# Patient Record
Sex: FEMALE | Race: WHITE | Employment: OTHER | URBAN - METROPOLITAN AREA
[De-identification: names, ages, dates, MRNs, and addresses within clinical notes are randomized per-mention and may not be internally consistent; named-entity substitution may affect disease eponyms.]

---

## 2017-09-27 ENCOUNTER — OFFICE VISIT (OUTPATIENT)
Dept: CARDIOLOGY CLINIC | Age: 77
End: 2017-09-27

## 2017-09-27 VITALS
HEART RATE: 68 BPM | SYSTOLIC BLOOD PRESSURE: 128 MMHG | WEIGHT: 190 LBS | HEIGHT: 63 IN | DIASTOLIC BLOOD PRESSURE: 78 MMHG | BODY MASS INDEX: 33.66 KG/M2

## 2017-09-27 DIAGNOSIS — I48.19 PERSISTENT ATRIAL FIBRILLATION (HCC): Primary | ICD-10-CM

## 2017-09-27 PROCEDURE — 1036F TOBACCO NON-USER: CPT | Performed by: INTERNAL MEDICINE

## 2017-09-27 PROCEDURE — G8419 CALC BMI OUT NRM PARAM NOF/U: HCPCS | Performed by: INTERNAL MEDICINE

## 2017-09-27 PROCEDURE — 1123F ACP DISCUSS/DSCN MKR DOCD: CPT | Performed by: INTERNAL MEDICINE

## 2017-09-27 PROCEDURE — 4040F PNEUMOC VAC/ADMIN/RCVD: CPT | Performed by: INTERNAL MEDICINE

## 2017-09-27 PROCEDURE — 99213 OFFICE O/P EST LOW 20 MIN: CPT | Performed by: INTERNAL MEDICINE

## 2017-09-27 PROCEDURE — G8400 PT W/DXA NO RESULTS DOC: HCPCS | Performed by: INTERNAL MEDICINE

## 2017-09-27 PROCEDURE — 1090F PRES/ABSN URINE INCON ASSESS: CPT | Performed by: INTERNAL MEDICINE

## 2017-09-27 PROCEDURE — G8428 CUR MEDS NOT DOCUMENT: HCPCS | Performed by: INTERNAL MEDICINE

## 2017-09-27 RX ORDER — LANOLIN ALCOHOL/MO/W.PET/CERES
1000 CREAM (GRAM) TOPICAL EVERY OTHER DAY
COMMUNITY

## 2017-09-27 RX ORDER — ACETAMINOPHEN 325 MG/1
650 TABLET ORAL 3 TIMES DAILY
COMMUNITY
End: 2021-09-29

## 2017-12-22 ENCOUNTER — TELEPHONE (OUTPATIENT)
Dept: CARDIOLOGY CLINIC | Age: 77
End: 2017-12-22

## 2017-12-22 ENCOUNTER — NURSE ONLY (OUTPATIENT)
Dept: CARDIOLOGY CLINIC | Age: 77
End: 2017-12-22

## 2017-12-22 VITALS
HEART RATE: 83 BPM | HEIGHT: 63 IN | WEIGHT: 190 LBS | SYSTOLIC BLOOD PRESSURE: 130 MMHG | DIASTOLIC BLOOD PRESSURE: 80 MMHG | BODY MASS INDEX: 33.66 KG/M2

## 2017-12-22 DIAGNOSIS — R07.89 OTHER CHEST PAIN: Primary | ICD-10-CM

## 2017-12-22 PROCEDURE — 93000 ELECTROCARDIOGRAM COMPLETE: CPT | Performed by: INTERNAL MEDICINE

## 2017-12-22 NOTE — TELEPHONE ENCOUNTER
Daughter called stated that patient has been having some slight chest discomfort for about 3 days  No arm pain or sob please call

## 2018-07-13 LAB — TSH SERPL DL<=0.05 MIU/L-ACNC: 1.77 UIU/ML

## 2018-09-27 ENCOUNTER — OFFICE VISIT (OUTPATIENT)
Dept: CARDIOLOGY CLINIC | Age: 78
End: 2018-09-27
Payer: MEDICARE

## 2018-09-27 VITALS
BODY MASS INDEX: 33.66 KG/M2 | WEIGHT: 190 LBS | SYSTOLIC BLOOD PRESSURE: 122 MMHG | HEART RATE: 64 BPM | DIASTOLIC BLOOD PRESSURE: 82 MMHG | HEIGHT: 63 IN

## 2018-09-27 DIAGNOSIS — I48.91 ATRIAL FIBRILLATION, UNSPECIFIED TYPE (HCC): Primary | ICD-10-CM

## 2018-09-27 PROCEDURE — 1036F TOBACCO NON-USER: CPT | Performed by: INTERNAL MEDICINE

## 2018-09-27 PROCEDURE — G8400 PT W/DXA NO RESULTS DOC: HCPCS | Performed by: INTERNAL MEDICINE

## 2018-09-27 PROCEDURE — 4040F PNEUMOC VAC/ADMIN/RCVD: CPT | Performed by: INTERNAL MEDICINE

## 2018-09-27 PROCEDURE — 1101F PT FALLS ASSESS-DOCD LE1/YR: CPT | Performed by: INTERNAL MEDICINE

## 2018-09-27 PROCEDURE — 1090F PRES/ABSN URINE INCON ASSESS: CPT | Performed by: INTERNAL MEDICINE

## 2018-09-27 PROCEDURE — G8417 CALC BMI ABV UP PARAM F/U: HCPCS | Performed by: INTERNAL MEDICINE

## 2018-09-27 PROCEDURE — G8427 DOCREV CUR MEDS BY ELIG CLIN: HCPCS | Performed by: INTERNAL MEDICINE

## 2018-09-27 PROCEDURE — 99213 OFFICE O/P EST LOW 20 MIN: CPT | Performed by: INTERNAL MEDICINE

## 2018-09-27 PROCEDURE — 1123F ACP DISCUSS/DSCN MKR DOCD: CPT | Performed by: INTERNAL MEDICINE

## 2018-09-27 NOTE — PROGRESS NOTES
Value Date    WBC 6.3 08/26/2015    HCT 31.3 (L) 08/26/2015    MCV 97.4 08/26/2015     08/26/2015     No results found for: CHOL, TRIG, HDL, LDLCALC, LDLDIRECT, CHOLHDLRATIO  Lab Results   Component Value Date    ALT 32 08/06/2015    AST 20 08/06/2015     BMP:    Lab Results   Component Value Date     08/26/2015    K 4.2 08/26/2015     08/26/2015    CO2 23 08/26/2015    BUN 15 08/26/2015    CREATININE 0.9 08/26/2015     CMP:   Lab Results   Component Value Date     08/26/2015    K 4.2 08/26/2015     08/26/2015    CO2 23 08/26/2015    BUN 15 08/26/2015    PROT 5.0 08/06/2015     TSH:    Lab Results   Component Value Date    TSH 3.2 07/15/2014    TSHHS 4.380 07/29/2015           Impression:    No diagnosis found. Patient Active Problem List   Diagnosis Code    Syncope and collapse R55    Weakness R53.1    H/O echocardiogram Z92.89    DM2 (diabetes mellitus, type 2) (HCC) E11.9    Hypothyroid E03.9    Pulmonary embolus, right (HCC) I26.99    Right leg DVT ([facility]) I82.401    Pulmonary embolus (HCC) I26.99    Parkinson disease ([facility] [address] G20    Other chest pain R07.89       Assessment & Plan:    -  A Fib  HR controlled in RRR  On anticoag    -   DIABETES MELLITUS: Available pertinent lab data reviewed   and  patient was given dietary advice . Advised to check blood glucose level on a regular basis. -   Changes  in medicines made:  No                      Jose Angel Kapadia MA  [facility] [place]

## 2019-05-15 ENCOUNTER — NURSE ONLY (OUTPATIENT)
Dept: FAMILY MEDICINE CLINIC | Age: 79
End: 2019-05-15
Payer: MEDICARE

## 2019-05-15 DIAGNOSIS — I26.99 PULMONARY EMBOLUS, RIGHT (HCC): Primary | ICD-10-CM

## 2019-05-15 LAB
INTERNATIONAL NORMALIZATION RATIO, POC: 2.2
PROTHROMBIN TIME, POC: NORMAL

## 2019-05-15 PROCEDURE — 85610 PROTHROMBIN TIME: CPT | Performed by: FAMILY MEDICINE

## 2019-05-16 ENCOUNTER — TELEPHONE (OUTPATIENT)
Dept: FAMILY MEDICINE CLINIC | Age: 79
End: 2019-05-16

## 2019-05-16 NOTE — TELEPHONE ENCOUNTER
Spoke with dtr Cullen Paredes [phone]. Already aware result & has scheduled future inr visit.   Electronically signed by Jayce Farrell LPN on 8/57/7540 at 3:95 AM

## 2019-06-01 DIAGNOSIS — N39.0 RECURRENT URINARY TRACT INFECTION: ICD-10-CM

## 2019-06-01 DIAGNOSIS — E53.8 VITAMIN B12 DEFICIENCY: ICD-10-CM

## 2019-06-01 DIAGNOSIS — E55.9 VITAMIN D DEFICIENCY: ICD-10-CM

## 2019-06-01 RX ORDER — LISINOPRIL 5 MG/1
5 TABLET ORAL DAILY
COMMUNITY
End: 2019-09-27

## 2019-06-01 RX ORDER — MAGNESIUM OXIDE 400 MG/1
400 TABLET ORAL DAILY
COMMUNITY
End: 2019-06-19

## 2019-06-01 RX ORDER — BACLOFEN 10 MG/1
10 TABLET ORAL SEE ADMIN INSTRUCTIONS
COMMUNITY
End: 2020-04-27

## 2019-06-19 ENCOUNTER — OFFICE VISIT (OUTPATIENT)
Dept: FAMILY MEDICINE CLINIC | Age: 79
End: 2019-06-19
Payer: MEDICARE

## 2019-06-19 VITALS — DIASTOLIC BLOOD PRESSURE: 80 MMHG | SYSTOLIC BLOOD PRESSURE: 132 MMHG | HEART RATE: 72 BPM | OXYGEN SATURATION: 93 %

## 2019-06-19 DIAGNOSIS — E03.9 ACQUIRED HYPOTHYROIDISM: ICD-10-CM

## 2019-06-19 DIAGNOSIS — R53.83 FATIGUE, UNSPECIFIED TYPE: ICD-10-CM

## 2019-06-19 DIAGNOSIS — E11.9 TYPE 2 DIABETES MELLITUS WITHOUT COMPLICATION, WITHOUT LONG-TERM CURRENT USE OF INSULIN (HCC): ICD-10-CM

## 2019-06-19 DIAGNOSIS — I48.0 PAROXYSMAL ATRIAL FIBRILLATION (HCC): ICD-10-CM

## 2019-06-19 DIAGNOSIS — K59.00 CONSTIPATION, UNSPECIFIED CONSTIPATION TYPE: ICD-10-CM

## 2019-06-19 DIAGNOSIS — G20 PARKINSON DISEASE (HCC): ICD-10-CM

## 2019-06-19 DIAGNOSIS — E11.9 TYPE 2 DIABETES MELLITUS WITHOUT COMPLICATION, WITHOUT LONG-TERM CURRENT USE OF INSULIN (HCC): Primary | ICD-10-CM

## 2019-06-19 PROBLEM — R07.89 OTHER CHEST PAIN: Status: RESOLVED | Noted: 2017-12-22 | Resolved: 2019-06-19

## 2019-06-19 LAB
ALBUMIN SERPL-MCNC: 4.3 G/DL (ref 3.4–5)
ALP BLD-CCNC: 69 U/L (ref 40–129)
ALT SERPL-CCNC: <5 U/L (ref 10–40)
ANION GAP SERPL CALCULATED.3IONS-SCNC: 10 MMOL/L (ref 3–16)
AST SERPL-CCNC: 13 U/L (ref 15–37)
BASOPHILS ABSOLUTE: 0 K/UL (ref 0–0.2)
BASOPHILS RELATIVE PERCENT: 0.7 %
BILIRUB SERPL-MCNC: 0.4 MG/DL (ref 0–1)
BILIRUBIN DIRECT: <0.2 MG/DL (ref 0–0.3)
BILIRUBIN, INDIRECT: ABNORMAL MG/DL (ref 0–1)
BUN BLDV-MCNC: 28 MG/DL (ref 7–20)
CALCIUM SERPL-MCNC: 9.6 MG/DL (ref 8.3–10.6)
CHLORIDE BLD-SCNC: 101 MMOL/L (ref 99–110)
CO2: 24 MMOL/L (ref 21–32)
CREAT SERPL-MCNC: 0.9 MG/DL (ref 0.6–1.2)
EOSINOPHILS ABSOLUTE: 0.1 K/UL (ref 0–0.6)
EOSINOPHILS RELATIVE PERCENT: 1.7 %
GFR AFRICAN AMERICAN: >60
GFR NON-AFRICAN AMERICAN: >60
GLUCOSE BLD-MCNC: 130 MG/DL (ref 70–99)
HCT VFR BLD CALC: 40.4 % (ref 36–48)
HEMOGLOBIN: 13.4 G/DL (ref 12–16)
INTERNATIONAL NORMALIZATION RATIO, POC: 2
LYMPHOCYTES ABSOLUTE: 1.5 K/UL (ref 1–5.1)
LYMPHOCYTES RELATIVE PERCENT: 27.9 %
MCH RBC QN AUTO: 33.9 PG (ref 26–34)
MCHC RBC AUTO-ENTMCNC: 33.2 G/DL (ref 31–36)
MCV RBC AUTO: 102.1 FL (ref 80–100)
MONOCYTES ABSOLUTE: 0.6 K/UL (ref 0–1.3)
MONOCYTES RELATIVE PERCENT: 11.9 %
NEUTROPHILS ABSOLUTE: 3 K/UL (ref 1.7–7.7)
NEUTROPHILS RELATIVE PERCENT: 57.8 %
PDW BLD-RTO: 13.3 % (ref 12.4–15.4)
PLATELET # BLD: 269 K/UL (ref 135–450)
PMV BLD AUTO: 8.1 FL (ref 5–10.5)
POTASSIUM SERPL-SCNC: 4.5 MMOL/L (ref 3.5–5.1)
RBC # BLD: 3.95 M/UL (ref 4–5.2)
SODIUM BLD-SCNC: 135 MMOL/L (ref 136–145)
TOTAL PROTEIN: 7.3 G/DL (ref 6.4–8.2)
TSH REFLEX FT4: 2.1 UIU/ML (ref 0.27–4.2)
WBC # BLD: 5.3 K/UL (ref 4–11)

## 2019-06-19 PROCEDURE — 99214 OFFICE O/P EST MOD 30 MIN: CPT | Performed by: FAMILY MEDICINE

## 2019-06-19 ASSESSMENT — PATIENT HEALTH QUESTIONNAIRE - PHQ9
1. LITTLE INTEREST OR PLEASURE IN DOING THINGS: 0
2. FEELING DOWN, DEPRESSED OR HOPELESS: 0
SUM OF ALL RESPONSES TO PHQ9 QUESTIONS 1 & 2: 0
SUM OF ALL RESPONSES TO PHQ QUESTIONS 1-9: 0
SUM OF ALL RESPONSES TO PHQ QUESTIONS 1-9: 0

## 2019-06-19 ASSESSMENT — ENCOUNTER SYMPTOMS
SINUS PRESSURE: 0
SHORTNESS OF BREATH: 0
ABDOMINAL PAIN: 0
CHEST TIGHTNESS: 0
EYES NEGATIVE: 1
CONSTIPATION: 1
COUGH: 0
RESPIRATORY NEGATIVE: 1
DIARRHEA: 0
RHINORRHEA: 0
ALLERGIC/IMMUNOLOGIC NEGATIVE: 1
SORE THROAT: 0

## 2019-06-19 NOTE — PROGRESS NOTES
6/19/2019    Nirali Sparksford    Chief Complaint   Patient presents with    Other     - altered mental status post startting magnesium. returned to normal post stopping med    Other     - dtr was afraid to start lisinopril d/t sx's with magnesium       HPI  History was obtained from patient, daughter & her . Deven Taylor is a 78 y.o. female who presents today with complaint of altered mental status, and other complaints as well. States she had some altered mental status changes after she had started Magnesium. They stopped her Magnesium and her mental status went back to normal.  She still seems a bit sluggish or \"droopy\" to her family, but they say she is this way most of the time. Her daughter has not started her on the Lisinopril because she is worried of any interactions since the Magnesium episode. Complaining of feeling very tired and she can not keep her eyes open. She feels like she is squinting her eyes constantly. Her eyes are sensitive to bright lights. Has cataract but was to have surgery last year that they had to cancel due to a UTI. Her blood pressures @ home are up & down, numbers they are seeing are: 132/74, 137/69, 151/84, 90/60. She doesn't get much fluid intake, seems slightly dehydrated to daughter quite a bit. Complaining of constipation, jose lax 1 capful every morning, adding prune juice when needed. Her daughter notices Deven Taylor being more motivated or more awake if she is able to move her bowels. REVIEW OF SYMPTOMS    Review of Systems   Constitutional: Positive for fatigue. Negative for chills and fever. HENT: Negative. Negative for rhinorrhea, sinus pressure and sore throat. Eyes: Negative. Respiratory: Negative. Negative for cough, chest tightness and shortness of breath. Cardiovascular: Negative. Gastrointestinal: Positive for constipation. Negative for abdominal pain and diarrhea. Endocrine: Negative.     Genitourinary: Negative for dysuria and frequency. Musculoskeletal: Negative for myalgias. Skin: Negative. Allergic/Immunologic: Negative. Neurological: Positive for tremors. Hematological: Negative. PAST MEDICAL HISTORY  Past Medical History:   Diagnosis Date    Atrial fibrillation ([address] 01/27/2016    per holter monitor    Back pain     severe degeneration    Gallstone     H/O cardiovascular stress test 8/12/2014    cardiolite-normal, no ischemia, EF70%    H/O Doppler ultrasound 10/1/14    Carotid doppler. No hemodynamically significant stenosis bilaterally.  H/O echocardiogram 08/12/2014    EF >57%, mild mitral and tricuspid regurg, normal LV systolic but abnormal diastolic function, no pericardial effusion.     History of Holter monitoring 1/27/2016    new onset afib    Leg cramping     Recurrent urinary tract infection     Spinal stenosis of lumbar region     Vitamin B12 deficiency     Vitamin D deficiency        FAMILY HISTORY  Family History   Problem Relation Age of Onset    High Blood Pressure Sister     Heart Disease Brother        SOCIAL HISTORY  Social History     Socioeconomic History    Marital status:      Spouse name: None    Number of children: None    Years of education: None    Highest education level: None   Occupational History    None   Social Needs    Financial resource strain: None    Food insecurity:     Worry: None     Inability: None    Transportation needs:     Medical: None     Non-medical: None   Tobacco Use    Smoking status: Never Smoker    Smokeless tobacco: Never Used   Substance and Sexual Activity    Alcohol use: No    Drug use: No    Sexual activity: None   Lifestyle    Physical activity:     Days per week: None     Minutes per session: None    Stress: None   Relationships    Social connections:     Talks on phone: None     Gets together: None     Attends Mu-ism service: None     Active member of club or organization: None     Attends meetings of clubs or organizations: None     Relationship status: None    Intimate partner violence:     Fear of current or ex partner: None     Emotionally abused: None     Physically abused: None     Forced sexual activity: None   Other Topics Concern    None   Social History Narrative    None        SURGICAL HISTORY  No past surgical history on file. CURRENT MEDICATIONS  Current Outpatient Medications   Medication Sig Dispense Refill    ACCU-CHEK HIEN PLUS strip TEST ONCE A DAY AND AS NEEDED 50 strip 5    baclofen (LIORESAL) 10 MG tablet Take 10 mg by mouth See Admin Instructions Indications: 1/2 tab 6 times per day and 1 tab in evening      metFORMIN (GLUCOPHAGE) 500 MG tablet Take 500 mg by mouth 2 times daily (with meals)      acetaminophen (TYLENOL) 325 MG tablet Take 650 mg by mouth 3 times daily       vitamin B-12 (CYANOCOBALAMIN) 1000 MCG tablet Take 1,000 mcg by mouth daily      carbidopa-levodopa-entacapone (STALEVO) -200 MG TABS Take 1 tablet by mouth daily       warfarin (COUMADIN) 4 MG tablet Take 1 tablet by mouth daily (Patient taking differently: Take 4 mg by mouth daily 1/2-1 tab daily) 30 tablet 3    folic acid (FOLVITE) 1 MG tablet Take 1 tablet by mouth daily 30 tablet 3    amantadine (SYMMETREL) 100 MG capsule Take 1 capsule by mouth daily 60 capsule 3    levothyroxine (SYNTHROID) 50 MCG tablet Take 1 tablet by mouth Daily 30 tablet 3    polyethylene glycol (GLYCOLAX) powder Take 17 g by mouth daily   5    Cholecalciferol (VITAMIN D) 2000 UNITS CAPS capsule Take 1 capsule by mouth.  lisinopril (PRINIVIL;ZESTRIL) 5 MG tablet Take 5 mg by mouth daily      magnesium oxide (MAG-OX) 400 (241.3 MG) MG TABS tablet Take 1 tablet by mouth daily. 30 tablet 5     No current facility-administered medications for this visit.         ALLERGIES  Allergies   Allergen Reactions    Ciprofibrate     Ciprofloxacin Other (See Comments)     Anorexia      Pregabalin        PHYSICAL EXAM    BP 132/80   Pulse 72   SpO2 93%     Physical Exam   Constitutional: She is oriented to person, place, and time. She appears well-developed. HENT:   Head: Normocephalic. Eyes: Pupils are equal, round, and reactive to light. Conjunctivae and EOM are normal. Right eye exhibits no discharge. Left eye exhibits no discharge. No scleral icterus. Neck: Normal range of motion. Neck supple. Cardiovascular: Normal rate, regular rhythm and normal heart sounds. Exam reveals no gallop and no friction rub. No murmur heard. Heart regular and not fast  Negative for Carotid Bruit. Pulmonary/Chest: Effort normal and breath sounds normal. No stridor. No respiratory distress. She has no wheezes. She has no rales. She exhibits no tenderness. Lungs clear   Musculoskeletal: Normal range of motion. She exhibits no edema, tenderness or deformity. Neurological: She is alert and oriented to person, place, and time. Skin: Skin is warm and dry. No rash noted. She is not diaphoretic. No erythema. No pallor. Psychiatric: She has a normal mood and affect. Thought content normal.   Nursing note and vitals reviewed. ASSESSMENT & PLAN    1. Type 2 diabetes mellitus without complication, without long-term current use of insulin (HCC)  It sounds the patient's sugars are controlled. We will check labs and adjust medication off of the results. Daughter is concerned that medications are giving her side effects although none of her medication doses have changed that she is currently on. We will check her kidneys and liver to make sure that her processing her old medicines the same.  - blood glucose test strips (ACCU-CHEK HIEN PLUS) strip; Inject 1 each into the skin daily As needed. Dispense: 100 strip; Refill: 1  - Hepatic Function Panel; Future  - Hemoglobin A1C; Future    2. Acquired hypothyroidism  Recheck thyroid  - TSH WITH REFLEX TO FT4; Future    3.  Paroxysmal atrial fibrillation (HCC)  Is currently in sinus rhythm without problems. 4. Parkinson disease ([facility] [address]  Current complaints do not sound related to Parkinson's. 5. Fatigue, unspecified type  See Long discussion above. Will rule out thyroid, hepatic, renal, infectious etiologies as well as anemia. But feel that her cataract issues are her main problem. - Basic Metabolic Panel; Future  - CBC Auto Differential; Future    6. Constipation, unspecified constipation type  Not controlled. Increase MiraLAX to 2 capfuls up to twice a day and increase raisins states apples prunes. Return in about 3 months (around 9/19/2019) for cancel 8/1 visit, r/s 3 months. Electronically signed by Patricia Feng [address] [place] [address] [place] on 6/19/2019      Scribe Authentication Statement  I, Patricia Feng, scribed portions of this documentation for and in the presence of Jw Biggs MD on 6/19/19 at 2:47 PM.     Attestation statement -I have read and amended the note scribed by Patricia Feng. I can affirm its accuracy.

## 2019-06-20 ENCOUNTER — ANTI-COAG VISIT (OUTPATIENT)
Dept: FAMILY MEDICINE CLINIC | Age: 79
End: 2019-06-20
Payer: MEDICARE

## 2019-06-20 ENCOUNTER — TELEPHONE (OUTPATIENT)
Dept: FAMILY MEDICINE CLINIC | Age: 79
End: 2019-06-20

## 2019-06-20 DIAGNOSIS — I26.99 PULMONARY EMBOLUS, RIGHT (HCC): ICD-10-CM

## 2019-06-20 LAB
ESTIMATED AVERAGE GLUCOSE: 119.8 MG/DL
HBA1C MFR BLD: 5.8 %
INTERNATIONAL NORMALIZATION RATIO, POC: NORMAL
PROTHROMBIN TIME, POC: NORMAL

## 2019-06-20 PROCEDURE — 85610 PROTHROMBIN TIME: CPT | Performed by: FAMILY MEDICINE

## 2019-06-20 NOTE — TELEPHONE ENCOUNTER
----- Message from Bucky Bernard MD sent at 6/20/2019  7:53 AM EDT -----  Call patient's or daughter. Congratulations your labs are all extremely good. Your A1c is 5.8 which is borderline normal for a person without diabetes. Would recommend decreasing your metformin to just 1 pill once a day. Keep doing a great job.

## 2019-06-24 ENCOUNTER — TELEPHONE (OUTPATIENT)
Dept: FAMILY MEDICINE CLINIC | Age: 79
End: 2019-06-24

## 2019-06-24 RX ORDER — CARBIDOPA, LEVODOPA AND ENTACAPONE 25; 200; 100 MG/1; MG/1; MG/1
TABLET, FILM COATED ORAL
Qty: 450 TABLET | Refills: 1 | Status: SHIPPED | OUTPATIENT
Start: 2019-06-24 | End: 2019-09-27 | Stop reason: SDUPTHER

## 2019-06-24 NOTE — TELEPHONE ENCOUNTER
----- Message from Herbert Simmons sent at 6/24/2019 11:32 AM EDT -----  CARBIDOPA 25/100/200 SCRIPT WAS SENT IN AS 1 QD BUT PT TAKES 1- 5 TIMES A DAY          LUCY - PLEASE CORRECT!  THX

## 2019-07-16 ENCOUNTER — NURSE ONLY (OUTPATIENT)
Dept: FAMILY MEDICINE CLINIC | Age: 79
End: 2019-07-16
Payer: MEDICARE

## 2019-07-16 DIAGNOSIS — I26.99 PULMONARY EMBOLUS, RIGHT (HCC): ICD-10-CM

## 2019-07-16 LAB
INTERNATIONAL NORMALIZATION RATIO, POC: 1.8
PROTHROMBIN TIME, POC: ABNORMAL

## 2019-07-16 PROCEDURE — 85610 PROTHROMBIN TIME: CPT | Performed by: FAMILY MEDICINE

## 2019-07-16 NOTE — PROGRESS NOTES
INR 1.8. PER DR. FRANCOIS, CHANGE COUMADIN TO 2/2/1 AND RECHECK IN 2 WEEKS. FAMILY VOICED UNDERSTANDING.

## 2019-07-31 ENCOUNTER — NURSE ONLY (OUTPATIENT)
Dept: FAMILY MEDICINE CLINIC | Age: 79
End: 2019-07-31
Payer: MEDICARE

## 2019-07-31 DIAGNOSIS — I26.99 PULMONARY EMBOLUS, RIGHT (HCC): ICD-10-CM

## 2019-07-31 LAB
INTERNATIONAL NORMALIZATION RATIO, POC: 2.1
PROTHROMBIN TIME, POC: NORMAL

## 2019-07-31 PROCEDURE — 85610 PROTHROMBIN TIME: CPT | Performed by: FAMILY MEDICINE

## 2019-08-22 ENCOUNTER — TELEPHONE (OUTPATIENT)
Dept: FAMILY MEDICINE CLINIC | Age: 79
End: 2019-08-22
Payer: MEDICARE

## 2019-08-22 DIAGNOSIS — N39.0 URINARY TRACT INFECTION WITHOUT HEMATURIA, SITE UNSPECIFIED: Primary | ICD-10-CM

## 2019-08-22 PROCEDURE — 81002 URINALYSIS NONAUTO W/O SCOPE: CPT | Performed by: FAMILY MEDICINE

## 2019-08-23 ENCOUNTER — TELEPHONE (OUTPATIENT)
Dept: FAMILY MEDICINE CLINIC | Age: 79
End: 2019-08-23

## 2019-08-23 DIAGNOSIS — N39.0 RECURRENT URINARY TRACT INFECTION: Primary | ICD-10-CM

## 2019-08-23 DIAGNOSIS — N39.0 RECURRENT URINARY TRACT INFECTION: ICD-10-CM

## 2019-08-23 DIAGNOSIS — I26.99 PULMONARY EMBOLUS, RIGHT (HCC): ICD-10-CM

## 2019-08-23 LAB
BILIRUBIN, POC: ABNORMAL
BLOOD URINE, POC: 50
CLARITY, POC: ABNORMAL
COLOR, POC: ABNORMAL
GLUCOSE URINE, POC: NORMAL
KETONES, POC: ABNORMAL
LEUKOCYTE EST, POC: ABNORMAL
NITRITE, POC: ABNORMAL
PH, POC: 5
PROTEIN, POC: ABNORMAL
SPECIFIC GRAVITY, POC: 1.02
UROBILINOGEN, POC: NORMAL

## 2019-08-23 PROCEDURE — 85610 PROTHROMBIN TIME: CPT | Performed by: FAMILY MEDICINE

## 2019-08-23 RX ORDER — NITROFURANTOIN 25; 75 MG/1; MG/1
100 CAPSULE ORAL 2 TIMES DAILY
Qty: 14 CAPSULE | Refills: 0 | Status: SHIPPED | OUTPATIENT
Start: 2019-08-23 | End: 2019-08-30

## 2019-08-23 NOTE — TELEPHONE ENCOUNTER
Spoke with pt's dtr Urmila per dr Vamshi Galan note.  Alice Ascencio agreed & voiced understanding    Requested Prescriptions     Signed Prescriptions Disp Refills    nitrofurantoin, macrocrystal-monohydrate, (MACROBID) 100 MG capsule 14 capsule 0     Sig: Take 1 capsule by mouth 2 times daily for 7 days     Authorizing Provider: Paty Khan     Ordering User: Estephania Carranza

## 2019-08-26 LAB
ORGANISM: ABNORMAL
URINE CULTURE, ROUTINE: ABNORMAL

## 2019-08-27 ENCOUNTER — TELEPHONE (OUTPATIENT)
Dept: FAMILY MEDICINE CLINIC | Age: 79
End: 2019-08-27

## 2019-08-27 RX ORDER — SULFAMETHOXAZOLE AND TRIMETHOPRIM 800; 160 MG/1; MG/1
1 TABLET ORAL 2 TIMES DAILY
Qty: 20 TABLET | Refills: 0 | Status: SHIPPED | OUTPATIENT
Start: 2019-08-27 | End: 2019-09-06

## 2019-09-11 RX ORDER — FOLIC ACID 1 MG/1
TABLET ORAL
Qty: 30 TABLET | Refills: 0 | Status: SHIPPED | OUTPATIENT
Start: 2019-09-11 | End: 2019-12-20

## 2019-09-18 ENCOUNTER — NURSE ONLY (OUTPATIENT)
Dept: FAMILY MEDICINE CLINIC | Age: 79
End: 2019-09-18
Payer: MEDICARE

## 2019-09-18 DIAGNOSIS — I26.99 PULMONARY EMBOLUS, RIGHT (HCC): ICD-10-CM

## 2019-09-18 LAB — INTERNATIONAL NORMALIZATION RATIO, POC: 2.3

## 2019-09-18 PROCEDURE — 85610 PROTHROMBIN TIME: CPT | Performed by: FAMILY MEDICINE

## 2019-09-27 ENCOUNTER — OFFICE VISIT (OUTPATIENT)
Dept: FAMILY MEDICINE CLINIC | Age: 79
End: 2019-09-27
Payer: MEDICARE

## 2019-09-27 VITALS — OXYGEN SATURATION: 95 % | HEART RATE: 62 BPM

## 2019-09-27 DIAGNOSIS — G20 PARKINSON DISEASE (HCC): ICD-10-CM

## 2019-09-27 DIAGNOSIS — Z23 NEEDS FLU SHOT: Primary | ICD-10-CM

## 2019-09-27 DIAGNOSIS — I48.0 PAROXYSMAL ATRIAL FIBRILLATION (HCC): ICD-10-CM

## 2019-09-27 DIAGNOSIS — E11.9 TYPE 2 DIABETES MELLITUS WITHOUT COMPLICATION, WITHOUT LONG-TERM CURRENT USE OF INSULIN (HCC): ICD-10-CM

## 2019-09-27 DIAGNOSIS — E03.9 ACQUIRED HYPOTHYROIDISM: ICD-10-CM

## 2019-09-27 PROCEDURE — G0008 ADMIN INFLUENZA VIRUS VAC: HCPCS | Performed by: FAMILY MEDICINE

## 2019-09-27 PROCEDURE — G8400 PT W/DXA NO RESULTS DOC: HCPCS | Performed by: FAMILY MEDICINE

## 2019-09-27 PROCEDURE — 90653 IIV ADJUVANT VACCINE IM: CPT | Performed by: FAMILY MEDICINE

## 2019-09-27 PROCEDURE — G8417 CALC BMI ABV UP PARAM F/U: HCPCS | Performed by: FAMILY MEDICINE

## 2019-09-27 PROCEDURE — 1123F ACP DISCUSS/DSCN MKR DOCD: CPT | Performed by: FAMILY MEDICINE

## 2019-09-27 PROCEDURE — 1036F TOBACCO NON-USER: CPT | Performed by: FAMILY MEDICINE

## 2019-09-27 PROCEDURE — 4040F PNEUMOC VAC/ADMIN/RCVD: CPT | Performed by: FAMILY MEDICINE

## 2019-09-27 PROCEDURE — 1090F PRES/ABSN URINE INCON ASSESS: CPT | Performed by: FAMILY MEDICINE

## 2019-09-27 PROCEDURE — 99214 OFFICE O/P EST MOD 30 MIN: CPT | Performed by: FAMILY MEDICINE

## 2019-09-27 PROCEDURE — G8427 DOCREV CUR MEDS BY ELIG CLIN: HCPCS | Performed by: FAMILY MEDICINE

## 2019-09-27 RX ORDER — LANCETS
1 EACH MISCELLANEOUS 4 TIMES DAILY
Qty: 100 EACH | Refills: 5 | Status: SHIPPED | OUTPATIENT
Start: 2019-09-27 | End: 2019-10-04 | Stop reason: SDUPTHER

## 2019-09-27 RX ORDER — CARBIDOPA, LEVODOPA AND ENTACAPONE 25; 200; 100 MG/1; MG/1; MG/1
TABLET, FILM COATED ORAL
Qty: 450 TABLET | Refills: 1 | Status: SHIPPED | OUTPATIENT
Start: 2019-09-27 | End: 2020-01-31 | Stop reason: SDUPTHER

## 2019-09-27 RX ORDER — WARFARIN SODIUM 4 MG/1
4 TABLET ORAL DAILY
Qty: 30 TABLET | Refills: 0 | Status: SHIPPED | OUTPATIENT
Start: 2019-09-27 | End: 2019-11-21

## 2019-09-27 RX ORDER — AMANTADINE HYDROCHLORIDE 100 MG/1
100 CAPSULE, GELATIN COATED ORAL DAILY
Qty: 90 CAPSULE | Refills: 1 | Status: SHIPPED | OUTPATIENT
Start: 2019-09-27 | End: 2020-01-31 | Stop reason: SDUPTHER

## 2019-09-27 RX ORDER — LEVOTHYROXINE SODIUM 0.05 MG/1
50 TABLET ORAL DAILY
Qty: 90 TABLET | Refills: 1 | Status: SHIPPED | OUTPATIENT
Start: 2019-09-27 | End: 2020-01-31 | Stop reason: SDUPTHER

## 2019-09-27 ASSESSMENT — ENCOUNTER SYMPTOMS
CHEST TIGHTNESS: 0
SORE THROAT: 0
DIARRHEA: 0
RHINORRHEA: 0
ABDOMINAL PAIN: 0
SHORTNESS OF BREATH: 0
CONSTIPATION: 0
SINUS PRESSURE: 0
COUGH: 0

## 2019-09-27 NOTE — PROGRESS NOTES
9/27/2019    Immanuel Dykes    Chief Complaint   Patient presents with    3 Month Follow-Up    Medication Refill       HPI  Thierno Pena is a 78 y.o. female who presents today with possible multiple medical conditions. Daughter has a list of patient's blood sugars. They are excellent they range from 1 teens to 140s. Average for the month of August was 135 approximately and in September was 127. Patient and family deny low sugar spells. Patient is unable to weigh her self but feels that her that she has gained a little bit of weight. Patient notes compliance with her thyroid medication. She does not note situs effects from it. We reviewed her prior thyroid labs which were good. Patient denies feeling her heart racing and running. She feels she mostly runs in sinus rhythm. Patient notes some fatigue. And changes in energy status. Overall she feels pretty good. Family seems that it fluctuates with her movement abilities related to her dosing on her Parkinson's meds. Patient continues to follow-up with her Parkinson's doctor. REVIEW OF SYMPTOMS  Review of Systems   Constitutional: Negative for chills and fever. HENT: Negative for rhinorrhea, sinus pressure and sore throat. Respiratory: Negative for cough, chest tightness and shortness of breath. Gastrointestinal: Negative for abdominal pain, constipation and diarrhea. Genitourinary: Negative for dysuria and frequency. Musculoskeletal: Negative for myalgias. PAST MEDICAL HISTORY  Past Medical History:   Diagnosis Date    Atrial fibrillation ([facility] [address] 01/27/2016    per holter monitor    Back pain     severe degeneration    Gallstone     H/O cardiovascular stress test 8/12/2014    cardiolite-normal, no ischemia, EF70%    H/O Doppler ultrasound 10/1/14    Carotid doppler. No hemodynamically significant stenosis bilaterally.     H/O echocardiogram 08/12/2014    EF >57%, mild mitral and tricuspid regurg, normal LV systolic but abnormal diastolic function, no pericardial effusion.  History of Holter monitoring 1/27/2016    new onset afib    Leg cramping     Recurrent urinary tract infection     Spinal stenosis of lumbar region     Vitamin B12 deficiency     Vitamin D deficiency        FAMILY HISTORY  Family History   Problem Relation Age of Onset    High Blood Pressure Sister     Heart Disease Brother        SOCIAL HISTORY  Social History     Socioeconomic History    Marital status:      Spouse name: None    Number of children: None    Years of education: None    Highest education level: None   Occupational History    None   Social Needs    Financial resource strain: None    Food insecurity:     Worry: None     Inability: None    Transportation needs:     Medical: None     Non-medical: None   Tobacco Use    Smoking status: Never Smoker    Smokeless tobacco: Never Used   Substance and Sexual Activity    Alcohol use: No    Drug use: No    Sexual activity: None   Lifestyle    Physical activity:     Days per week: None     Minutes per session: None    Stress: None   Relationships    Social connections:     Talks on phone: None     Gets together: None     Attends Evangelical service: None     Active member of club or organization: None     Attends meetings of clubs or organizations: None     Relationship status: None    Intimate partner violence:     Fear of current or ex partner: None     Emotionally abused: None     Physically abused: None     Forced sexual activity: None   Other Topics Concern    None   Social History Narrative    None        SURGICAL HISTORY  No past surgical history on file.     CURRENT MEDICATIONS  Current Outpatient Medications   Medication Sig Dispense Refill    ACCU-CHEK SOFTCLIX LANCETS MISC 1 Device by Does not apply route 4 times daily 100 each 5    carbidopa-levodopa-entacapone (STALEVO 100) -200 MG TABS Pt takes 1 tablet  5 times daily 450 tablet 1    blood glucose test strips (ACCU-CHEK HIEN PLUS) strip Inject 1 each into the skin daily As needed. 100 strip 5    amantadine (SYMMETREL) 100 MG capsule Take 1 capsule by mouth daily 90 capsule 1    warfarin (COUMADIN) 4 MG tablet Take 1 tablet by mouth daily 30 tablet 0    levothyroxine (SYNTHROID) 50 MCG tablet Take 1 tablet by mouth Daily 90 tablet 1    metFORMIN (GLUCOPHAGE) 500 MG tablet Take 1 tablet by mouth 2 times daily (with meals) 373 tablet 1    folic acid (FOLVITE) 1 MG tablet TAKE ONE (1) TABLET BY MOUTH ONCE DAILY 30 tablet 0    baclofen (LIORESAL) 10 MG tablet Take 10 mg by mouth See Admin Instructions Indications: 1/2 tab 6 times per day and 1 tab in evening      acetaminophen (TYLENOL) 325 MG tablet Take 650 mg by mouth 3 times daily       vitamin B-12 (CYANOCOBALAMIN) 1000 MCG tablet Take 1,000 mcg by mouth daily      polyethylene glycol (GLYCOLAX) powder Take 17 g by mouth daily   5    Cholecalciferol (VITAMIN D) 2000 UNITS CAPS capsule Take 1 capsule by mouth. No current facility-administered medications for this visit. ALLERGIES  Allergies   Allergen Reactions    Ciprofibrate     Ciprofloxacin Other (See Comments)     Anorexia      Pregabalin        PHYSICAL EXAM  Pulse 62   SpO2 95%     Physical Exam   Constitutional: She is oriented to person, place, and time. She appears well-developed. HENT:   Head: Normocephalic. Eyes: Conjunctivae and EOM are normal.   Neck: Neck supple. Cardiovascular: Normal rate, regular rhythm and normal heart sounds. Pulmonary/Chest: Effort normal and breath sounds normal.   Musculoskeletal: Normal range of motion. Neurological: She is alert and oriented to person, place, and time. Skin: Skin is warm and dry. Psychiatric: She has a normal mood and affect. Thought content normal.                ASSESSMENT & PLAN  1. Type 2 diabetes mellitus without complication, without long-term current use of insulin (HCC)  Issue controlled. Continue meds.

## 2019-10-07 RX ORDER — LANCETS
EACH MISCELLANEOUS
Qty: 100 EACH | Refills: 1 | Status: SHIPPED | OUTPATIENT
Start: 2019-10-07 | End: 2020-03-06

## 2019-10-17 ENCOUNTER — NURSE ONLY (OUTPATIENT)
Dept: FAMILY MEDICINE CLINIC | Age: 79
End: 2019-10-17
Payer: MEDICARE

## 2019-10-17 DIAGNOSIS — I26.99 PULMONARY EMBOLUS, RIGHT (HCC): ICD-10-CM

## 2019-10-17 LAB
INTERNATIONAL NORMALIZATION RATIO, POC: 2.5
PROTHROMBIN TIME, POC: NORMAL

## 2019-10-17 PROCEDURE — 85610 PROTHROMBIN TIME: CPT | Performed by: FAMILY MEDICINE

## 2019-10-23 ENCOUNTER — OFFICE VISIT (OUTPATIENT)
Dept: CARDIOLOGY CLINIC | Age: 79
End: 2019-10-23
Payer: MEDICARE

## 2019-10-23 VITALS
WEIGHT: 190 LBS | BODY MASS INDEX: 33.66 KG/M2 | HEART RATE: 80 BPM | SYSTOLIC BLOOD PRESSURE: 118 MMHG | HEIGHT: 63 IN | DIASTOLIC BLOOD PRESSURE: 70 MMHG

## 2019-10-23 DIAGNOSIS — I48.91 ATRIAL FIBRILLATION, UNSPECIFIED TYPE (HCC): Primary | ICD-10-CM

## 2019-10-23 PROCEDURE — G8482 FLU IMMUNIZE ORDER/ADMIN: HCPCS | Performed by: INTERNAL MEDICINE

## 2019-10-23 PROCEDURE — 1036F TOBACCO NON-USER: CPT | Performed by: INTERNAL MEDICINE

## 2019-10-23 PROCEDURE — 99213 OFFICE O/P EST LOW 20 MIN: CPT | Performed by: INTERNAL MEDICINE

## 2019-10-23 PROCEDURE — 1090F PRES/ABSN URINE INCON ASSESS: CPT | Performed by: INTERNAL MEDICINE

## 2019-10-23 PROCEDURE — G8417 CALC BMI ABV UP PARAM F/U: HCPCS | Performed by: INTERNAL MEDICINE

## 2019-10-23 PROCEDURE — G8427 DOCREV CUR MEDS BY ELIG CLIN: HCPCS | Performed by: INTERNAL MEDICINE

## 2019-10-23 PROCEDURE — G8400 PT W/DXA NO RESULTS DOC: HCPCS | Performed by: INTERNAL MEDICINE

## 2019-10-23 PROCEDURE — 4040F PNEUMOC VAC/ADMIN/RCVD: CPT | Performed by: INTERNAL MEDICINE

## 2019-10-23 PROCEDURE — 1123F ACP DISCUSS/DSCN MKR DOCD: CPT | Performed by: INTERNAL MEDICINE

## 2019-11-04 ENCOUNTER — TELEPHONE (OUTPATIENT)
Dept: FAMILY MEDICINE CLINIC | Age: 79
End: 2019-11-04

## 2019-11-21 ENCOUNTER — NURSE ONLY (OUTPATIENT)
Dept: FAMILY MEDICINE CLINIC | Age: 79
End: 2019-11-21
Payer: MEDICARE

## 2019-11-21 DIAGNOSIS — I26.99 PULMONARY EMBOLUS, RIGHT (HCC): ICD-10-CM

## 2019-11-21 LAB
INTERNATIONAL NORMALIZATION RATIO, POC: 2.3
PROTHROMBIN TIME, POC: NORMAL

## 2019-11-21 PROCEDURE — 85610 PROTHROMBIN TIME: CPT | Performed by: FAMILY MEDICINE

## 2019-11-21 RX ORDER — WARFARIN SODIUM 2 MG/1
2 TABLET ORAL DAILY
COMMUNITY
End: 2019-11-21 | Stop reason: SDUPTHER

## 2019-11-21 RX ORDER — WARFARIN SODIUM 2 MG/1
2 TABLET ORAL DAILY
Qty: 30 TABLET | Refills: 1 | Status: SHIPPED | OUTPATIENT
Start: 2019-11-21 | End: 2020-01-31 | Stop reason: SDUPTHER

## 2019-12-19 ENCOUNTER — ANTI-COAG VISIT (OUTPATIENT)
Dept: FAMILY MEDICINE CLINIC | Age: 79
End: 2019-12-19

## 2019-12-19 ENCOUNTER — NURSE ONLY (OUTPATIENT)
Dept: FAMILY MEDICINE CLINIC | Age: 79
End: 2019-12-19
Payer: MEDICARE

## 2019-12-19 DIAGNOSIS — I26.99 PULMONARY EMBOLUS, RIGHT (HCC): ICD-10-CM

## 2019-12-19 LAB
INTERNATIONAL NORMALIZATION RATIO, POC: 2.4
PROTHROMBIN TIME, POC: NORMAL

## 2019-12-19 PROCEDURE — 85610 PROTHROMBIN TIME: CPT | Performed by: FAMILY MEDICINE

## 2019-12-20 RX ORDER — FOLIC ACID 1 MG/1
TABLET ORAL
Qty: 30 TABLET | Refills: 0 | Status: SHIPPED | OUTPATIENT
Start: 2019-12-20 | End: 2020-01-31 | Stop reason: SDUPTHER

## 2020-01-14 RX ORDER — BLOOD SUGAR DIAGNOSTIC
STRIP MISCELLANEOUS
Qty: 100 STRIP | Refills: 1 | Status: SHIPPED | OUTPATIENT
Start: 2020-01-14 | End: 2020-01-16 | Stop reason: SDUPTHER

## 2020-01-16 ENCOUNTER — NURSE ONLY (OUTPATIENT)
Dept: FAMILY MEDICINE CLINIC | Age: 80
End: 2020-01-16
Payer: MEDICARE

## 2020-01-16 LAB
INTERNATIONAL NORMALIZATION RATIO, POC: 2.2
PROTHROMBIN TIME, POC: NORMAL

## 2020-01-16 PROCEDURE — 99024 POSTOP FOLLOW-UP VISIT: CPT | Performed by: FAMILY MEDICINE

## 2020-01-16 PROCEDURE — 85610 PROTHROMBIN TIME: CPT | Performed by: FAMILY MEDICINE

## 2020-01-31 ENCOUNTER — OFFICE VISIT (OUTPATIENT)
Dept: FAMILY MEDICINE CLINIC | Age: 80
End: 2020-01-31
Payer: MEDICARE

## 2020-01-31 VITALS — DIASTOLIC BLOOD PRESSURE: 78 MMHG | HEART RATE: 65 BPM | OXYGEN SATURATION: 97 % | SYSTOLIC BLOOD PRESSURE: 110 MMHG

## 2020-01-31 DIAGNOSIS — E11.9 TYPE 2 DIABETES MELLITUS WITHOUT COMPLICATION, WITHOUT LONG-TERM CURRENT USE OF INSULIN (HCC): ICD-10-CM

## 2020-01-31 DIAGNOSIS — I82.461 DEEP VEIN THROMBOSIS (DVT) OF CALF MUSCLE VEIN OF RIGHT LOWER EXTREMITY, UNSPECIFIED CHRONICITY (HCC): ICD-10-CM

## 2020-01-31 DIAGNOSIS — E03.9 ACQUIRED HYPOTHYROIDISM: ICD-10-CM

## 2020-01-31 PROBLEM — G81.94 LEFT HEMIPLEGIA (HCC): Status: ACTIVE | Noted: 2020-01-31

## 2020-01-31 LAB
A/G RATIO: 1.5 (ref 1.1–2.2)
ALBUMIN SERPL-MCNC: 4.4 G/DL (ref 3.4–5)
ALP BLD-CCNC: 67 U/L (ref 40–129)
ALT SERPL-CCNC: <5 U/L (ref 10–40)
ANION GAP SERPL CALCULATED.3IONS-SCNC: 14 MMOL/L (ref 3–16)
AST SERPL-CCNC: 14 U/L (ref 15–37)
BASOPHILS ABSOLUTE: 0 K/UL (ref 0–0.2)
BASOPHILS RELATIVE PERCENT: 0.9 %
BILIRUB SERPL-MCNC: 0.5 MG/DL (ref 0–1)
BUN BLDV-MCNC: 27 MG/DL (ref 7–20)
CALCIUM SERPL-MCNC: 9.7 MG/DL (ref 8.3–10.6)
CHLORIDE BLD-SCNC: 103 MMOL/L (ref 99–110)
CO2: 23 MMOL/L (ref 21–32)
CREAT SERPL-MCNC: 0.8 MG/DL (ref 0.6–1.2)
EOSINOPHILS ABSOLUTE: 0.1 K/UL (ref 0–0.6)
EOSINOPHILS RELATIVE PERCENT: 1.2 %
GFR AFRICAN AMERICAN: >60
GFR NON-AFRICAN AMERICAN: >60
GLOBULIN: 2.9 G/DL
GLUCOSE BLD-MCNC: 116 MG/DL (ref 70–99)
HCT VFR BLD CALC: 41 % (ref 36–48)
HEMOGLOBIN: 13.7 G/DL (ref 12–16)
INR BLD: 2.29 (ref 0.86–1.14)
LYMPHOCYTES ABSOLUTE: 1.5 K/UL (ref 1–5.1)
LYMPHOCYTES RELATIVE PERCENT: 26.9 %
MCH RBC QN AUTO: 34.2 PG (ref 26–34)
MCHC RBC AUTO-ENTMCNC: 33.4 G/DL (ref 31–36)
MCV RBC AUTO: 102.4 FL (ref 80–100)
MONOCYTES ABSOLUTE: 0.6 K/UL (ref 0–1.3)
MONOCYTES RELATIVE PERCENT: 10.8 %
NEUTROPHILS ABSOLUTE: 3.4 K/UL (ref 1.7–7.7)
NEUTROPHILS RELATIVE PERCENT: 60.2 %
PDW BLD-RTO: 13.3 % (ref 12.4–15.4)
PLATELET # BLD: 236 K/UL (ref 135–450)
PMV BLD AUTO: 8.4 FL (ref 5–10.5)
POTASSIUM SERPL-SCNC: 4.4 MMOL/L (ref 3.5–5.1)
PROTHROMBIN TIME: 26.8 SEC (ref 10–13.2)
RBC # BLD: 4.01 M/UL (ref 4–5.2)
SODIUM BLD-SCNC: 140 MMOL/L (ref 136–145)
TOTAL PROTEIN: 7.3 G/DL (ref 6.4–8.2)
TSH REFLEX FT4: 1.34 UIU/ML (ref 0.27–4.2)
WBC # BLD: 5.6 K/UL (ref 4–11)

## 2020-01-31 PROCEDURE — 99214 OFFICE O/P EST MOD 30 MIN: CPT | Performed by: FAMILY MEDICINE

## 2020-01-31 RX ORDER — WARFARIN SODIUM 2 MG/1
2 TABLET ORAL DAILY
Qty: 30 TABLET | Refills: 1 | Status: SHIPPED | OUTPATIENT
Start: 2020-01-31 | End: 2020-07-29 | Stop reason: SDUPTHER

## 2020-01-31 RX ORDER — AMANTADINE HYDROCHLORIDE 100 MG/1
100 CAPSULE, GELATIN COATED ORAL DAILY
Qty: 90 CAPSULE | Refills: 1 | Status: SHIPPED | OUTPATIENT
Start: 2020-01-31 | End: 2020-10-06 | Stop reason: SDUPTHER

## 2020-01-31 RX ORDER — CARBIDOPA, LEVODOPA AND ENTACAPONE 25; 200; 100 MG/1; MG/1; MG/1
TABLET, FILM COATED ORAL
Qty: 450 TABLET | Refills: 1 | Status: SHIPPED | OUTPATIENT
Start: 2020-01-31 | End: 2020-12-21 | Stop reason: SDUPTHER

## 2020-01-31 RX ORDER — FOLIC ACID 1 MG/1
1 TABLET ORAL DAILY
Qty: 90 TABLET | Refills: 1 | Status: SHIPPED | OUTPATIENT
Start: 2020-01-31 | End: 2020-05-18 | Stop reason: SDUPTHER

## 2020-01-31 RX ORDER — LEVOTHYROXINE SODIUM 0.05 MG/1
50 TABLET ORAL DAILY
Qty: 90 TABLET | Refills: 1 | Status: SHIPPED | OUTPATIENT
Start: 2020-01-31 | End: 2020-10-12 | Stop reason: SDUPTHER

## 2020-01-31 ASSESSMENT — PATIENT HEALTH QUESTIONNAIRE - PHQ9
1. LITTLE INTEREST OR PLEASURE IN DOING THINGS: 0
SUM OF ALL RESPONSES TO PHQ9 QUESTIONS 1 & 2: 0
2. FEELING DOWN, DEPRESSED OR HOPELESS: 0
SUM OF ALL RESPONSES TO PHQ QUESTIONS 1-9: 0
SUM OF ALL RESPONSES TO PHQ QUESTIONS 1-9: 0

## 2020-01-31 ASSESSMENT — ENCOUNTER SYMPTOMS
CHEST TIGHTNESS: 0
RHINORRHEA: 0
CONSTIPATION: 0
COUGH: 0
DIARRHEA: 0
SHORTNESS OF BREATH: 0
ABDOMINAL PAIN: 0
SINUS PRESSURE: 0
SORE THROAT: 0

## 2020-01-31 NOTE — PROGRESS NOTES
1/31/2020    Noemi Dykes    Chief Complaint   Patient presents with   [facility] Other     4 mth f/u    Extremity Weakness     increased left arm weakness    Other     sleeping a lot       HPI  Karen Herndon is a 78 y.o. female who presents today for follow up:    Patient's daughter originally concerned about interval weakness of patient's left side which also involves left-sided neglect. I agree it seems more prominent. Been daughter who is a physical therapist states that this is a chronic issue but worse in the last few weeks. She denies any  new neurologic deficits. Patient questions whether she is just lower all over and its bringing out her weaker aspects. When I asked about weakness and the possibility of recurrent UTI. Family has not been following the clarity of her urine. I asked him to restart that. They are unsure if they have a urine culture cup at home. We will send in one home. Patient is not having fever. Patient complains of being somnolent. I discussed the difficulty of her not being able to earn tiredness due to her lack of activity related to Parkinson's. I also requested the possibility of sleep apnea. Neither she or the family seem very excited about pursuing that. Daughter does relate that it appears that she gets tired short after the dosing of her frequent Parkinson's medicines. I asked him to take that up with the neurologist as I am not familiar with sedation related to those meds. Patient denies hypoglycemic symptoms. Daughter brings in a list of blood sugars have ranged from 1 16-1 30. A very tight range for sugars which is great. Patient is not having any signs of bleeding troubles. Family is happy to wait for the results of the send away INR. Patient notes compliance with her Coumadin. REVIEW OF SYMPTOMS  Review of Systems   Constitutional: Negative for chills and fever. HENT: Negative for rhinorrhea, sinus pressure and sore throat.     Respiratory: Negative for cough, chest tightness and shortness of breath. Gastrointestinal: Negative for abdominal pain, constipation and diarrhea. Genitourinary: Negative for dysuria and frequency. Musculoskeletal: Negative for myalgias. PAST MEDICAL HISTORY  Past Medical History:   Diagnosis Date    Atrial fibrillation ([facility] [address] 01/27/2016    per holter monitor    Back pain     severe degeneration    Gallstone     H/O cardiovascular stress test 8/12/2014    cardiolite-normal, no ischemia, EF70%    H/O Doppler ultrasound 10/1/14    Carotid doppler. No hemodynamically significant stenosis bilaterally.  H/O echocardiogram 08/12/2014    EF >57%, mild mitral and tricuspid regurg, normal LV systolic but abnormal diastolic function, no pericardial effusion.     History of Holter monitoring 1/27/2016    new onset afib    Leg cramping     Recurrent urinary tract infection     Spinal stenosis of lumbar region     Vitamin B12 deficiency     Vitamin D deficiency        FAMILY HISTORY  Family History   Problem Relation Age of Onset    High Blood Pressure Sister     Heart Disease Brother        SOCIAL HISTORY  Social History     Socioeconomic History    Marital status:      Spouse name: Not on file    Number of children: Not on file    Years of education: Not on file    Highest education level: Not on file   Occupational History    Not on file   Social Needs    Financial resource strain: Not on file    Food insecurity:     Worry: Not on file     Inability: Not on file    Transportation needs:     Medical: Not on file     Non-medical: Not on file   Tobacco Use    Smoking status: Never Smoker    Smokeless tobacco: Never Used   Substance and Sexual Activity    Alcohol use: No    Drug use: No    Sexual activity: Not on file   Lifestyle    Physical activity:     Days per week: Not on file     Minutes per session: Not on file    Stress: Not on file   Relationships    Social connections:     Talks on phone: Not on file     Gets together: Not on file     Attends [employer] service: Not on file     Active member of club or organization: Not on file     Attends meetings of clubs or organizations: Not on file     Relationship status: Not on file    Intimate partner violence:     Fear of current or ex partner: Not on file     Emotionally abused: Not on file     Physically abused: Not on file     Forced sexual activity: Not on file   Other Topics Concern    Not on file   Social History Narrative    Not on file        SURGICAL HISTORY  No past surgical history on file. CURRENT MEDICATIONS  Current Outpatient Medications   Medication Sig Dispense Refill    warfarin (COUMADIN) 2 MG tablet Take 1 tablet by mouth daily 30 tablet 1    carbidopa-levodopa-entacapone (STALEVO 100) -200 MG TABS Pt takes 1 tablet  5 times daily 450 tablet 1    levothyroxine (SYNTHROID) 50 MCG tablet Take 1 tablet by mouth Daily 90 tablet 1    amantadine (SYMMETREL) 100 MG capsule Take 1 capsule by mouth daily 90 capsule 1    folic acid (FOLVITE) 1 MG tablet Take 1 tablet by mouth daily 90 tablet 1    blood glucose test strips (ACCU-CHEK HIEN PLUS) strip TEST BLOOD SUGAR DAILY AS NEEDED 100 strip 3    ACCU-CHEK SOFTCLIX LANCETS MISC USE 1 LANCET ONCE A  each 1    metFORMIN (GLUCOPHAGE) 500 MG tablet Take 1 tablet by mouth 2 times daily (with meals) 180 tablet 1    baclofen (LIORESAL) 10 MG tablet Take 10 mg by mouth See Admin Instructions Indications: 1/2 tab 6 times per day and 1 tab in evening      acetaminophen (TYLENOL) 325 MG tablet Take 650 mg by mouth 3 times daily       vitamin B-12 (CYANOCOBALAMIN) 1000 MCG tablet Take 1,000 mcg by mouth daily      polyethylene glycol (GLYCOLAX) powder Take 17 g by mouth daily   5    Cholecalciferol (VITAMIN D) 2000 UNITS CAPS capsule Take 1 capsule by mouth. No current facility-administered medications for this visit.         ALLERGIES  Allergies   Allergen Reactions    Ciprofibrate     Ciprofloxacin Other (See Comments)     Anorexia      Pregabalin        PHYSICAL EXAM  /78   Pulse 65   SpO2 97%     Physical Exam  Constitutional:       Appearance: She is well-developed. HENT:      Head: Normocephalic. Eyes:      Conjunctiva/sclera: Conjunctivae normal.   Neck:      Musculoskeletal: Neck supple. Cardiovascular:      Rate and Rhythm: Normal rate and regular rhythm. Heart sounds: Normal heart sounds. Pulmonary:      Effort: Pulmonary effort is normal.      Breath sounds: Normal breath sounds. Musculoskeletal: Normal range of motion. Skin:     General: Skin is warm and dry. Neurological:      Mental Status: She is alert and oriented to person, place, and time. Psychiatric:         Thought Content: Thought content normal.                  ASSESSMENT & PLAN  1. Parkinson disease ([address]  Continue medicines the same. Discussed possible sedation related to medicine with neurology next week. I will be interested in their input. If no problems there will pursue the possibility of UTI. If that is not there to reconsider sleep apnea testing.  - carbidopa-levodopa-entacapone (STALEVO 100) -200 MG TABS; Pt takes 1 tablet  5 times daily  Dispense: 450 tablet; Refill: 1  - amantadine (SYMMETREL) 100 MG capsule; Take 1 capsule by mouth daily  Dispense: 90 capsule; Refill: 1    2. Acquired hypothyroidism  Issue is stable check labs today. Adjust medication off of lab results. - levothyroxine (SYNTHROID) 50 MCG tablet; Take 1 tablet by mouth Daily  Dispense: 90 tablet; Refill: 1  - TSH WITH REFLEX TO FT4; Future    3. Type 2 diabetes mellitus without complication, without long-term current use of insulin (HCC)  Issue is stable check labs today. Adjust medication off of lab results. - CBC Auto Differential; Future  - Comprehensive Metabolic Panel; Future  - Hemoglobin A1C; Future    4.  Deep vein thrombosis (DVT) of calf muscle vein of right lower extremity, unspecified chronicity  Issue is stable check labs today. Adjust medication off of lab results. - warfarin (COUMADIN) 2 MG tablet; Take 1 tablet by mouth daily  Dispense: 30 tablet; Refill: 1  - Protime-INR; Future    5. Recurrent UTI  Check urine. If concerned of cloudiness please submit.  - Urine Culture; Future                 Return in about 4 months (around 5/31/2020).             Electronically signed by Nick Delgado MD on 1/31/2020

## 2020-02-01 LAB
ESTIMATED AVERAGE GLUCOSE: 114 MG/DL
HBA1C MFR BLD: 5.6 %

## 2020-02-06 ENCOUNTER — TELEPHONE (OUTPATIENT)
Dept: FAMILY MEDICINE CLINIC | Age: 80
End: 2020-02-06

## 2020-02-06 NOTE — TELEPHONE ENCOUNTER
Spoke with Himanshu Valdez, per dr Slick Ambriz. MI metformin. Average morning blood sugar should be 90 to 130.  Himanshu Valdez voiced understanding

## 2020-03-03 ENCOUNTER — ANTI-COAG VISIT (OUTPATIENT)
Dept: FAMILY MEDICINE CLINIC | Age: 80
End: 2020-03-03
Payer: MEDICARE

## 2020-03-03 ENCOUNTER — NURSE ONLY (OUTPATIENT)
Dept: FAMILY MEDICINE CLINIC | Age: 80
End: 2020-03-03

## 2020-03-03 LAB
INTERNATIONAL NORMALIZATION RATIO, POC: 2.3
INTERNATIONAL NORMALIZATION RATIO, POC: 2.6
INTERNATIONAL NORMALIZATION RATIO, POC: NORMAL
PROTHROMBIN TIME, POC: NORMAL

## 2020-03-03 PROCEDURE — 85610 PROTHROMBIN TIME: CPT | Performed by: FAMILY MEDICINE

## 2020-03-06 RX ORDER — LANCETS
EACH MISCELLANEOUS
Qty: 100 EACH | Refills: 1 | Status: SHIPPED | OUTPATIENT
Start: 2020-03-06 | End: 2020-05-04 | Stop reason: SDUPTHER

## 2020-04-02 ENCOUNTER — NURSE ONLY (OUTPATIENT)
Dept: FAMILY MEDICINE CLINIC | Age: 80
End: 2020-04-02
Payer: MEDICARE

## 2020-04-02 LAB
INTERNATIONAL NORMALIZATION RATIO, POC: 3.1
PROTHROMBIN TIME, POC: NORMAL

## 2020-04-02 PROCEDURE — 85610 PROTHROMBIN TIME: CPT | Performed by: FAMILY MEDICINE

## 2020-04-02 PROCEDURE — 99999 PR OFFICE/OUTPT VISIT,PROCEDURE ONLY: CPT | Performed by: FAMILY MEDICINE

## 2020-04-16 ENCOUNTER — NURSE ONLY (OUTPATIENT)
Dept: FAMILY MEDICINE CLINIC | Age: 80
End: 2020-04-16
Payer: MEDICARE

## 2020-04-16 LAB — INTERNATIONAL NORMALIZATION RATIO, POC: 3.3

## 2020-04-16 PROCEDURE — 85610 PROTHROMBIN TIME: CPT | Performed by: FAMILY MEDICINE

## 2020-04-23 RX ORDER — BACLOFEN 10 MG/1
TABLET ORAL
Qty: 0 TABLET | Refills: 0 | OUTPATIENT
Start: 2020-04-23

## 2020-04-27 RX ORDER — BACLOFEN 10 MG/1
TABLET ORAL
Qty: 300 TABLET | Refills: 0 | Status: SHIPPED | OUTPATIENT
Start: 2020-04-27

## 2020-04-30 ENCOUNTER — NURSE ONLY (OUTPATIENT)
Dept: FAMILY MEDICINE CLINIC | Age: 80
End: 2020-04-30
Payer: MEDICARE

## 2020-04-30 LAB
INTERNATIONAL NORMALIZATION RATIO, POC: 2.5
PROTHROMBIN TIME, POC: NORMAL

## 2020-04-30 PROCEDURE — 99024 POSTOP FOLLOW-UP VISIT: CPT | Performed by: FAMILY MEDICINE

## 2020-04-30 PROCEDURE — 85610 PROTHROMBIN TIME: CPT | Performed by: FAMILY MEDICINE

## 2020-05-07 RX ORDER — LANCETS
EACH MISCELLANEOUS
Qty: 100 EACH | Refills: 0 | Status: SHIPPED | OUTPATIENT
Start: 2020-05-07 | End: 2020-05-18 | Stop reason: SDUPTHER

## 2020-05-07 NOTE — TELEPHONE ENCOUNTER
Requested Prescriptions     Signed Prescriptions Disp Refills    Accu-Chek Softclix Lancets MISC 100 each 0     Sig: USE 1 LANCET ONCE A DAY     Authorizing Provider: Davida Ramirez     Ordering User: Anton Wilson

## 2020-05-15 ENCOUNTER — TELEPHONE (OUTPATIENT)
Dept: FAMILY MEDICINE CLINIC | Age: 80
End: 2020-05-15

## 2020-05-15 NOTE — TELEPHONE ENCOUNTER
LANCETS NEEDS DX CODE SO THE PHARMACY CAN RELEASE THEM TO PATIENT----SHE HAS BEEN WAITING SINCE 5/7/20---CAN THIS BE DONE TODAY.     PHARMACY:  [facility]

## 2020-05-18 ENCOUNTER — TELEMEDICINE (OUTPATIENT)
Dept: FAMILY MEDICINE CLINIC | Age: 80
End: 2020-05-18
Payer: MEDICARE

## 2020-05-18 PROCEDURE — 99214 OFFICE O/P EST MOD 30 MIN: CPT | Performed by: FAMILY MEDICINE

## 2020-05-18 RX ORDER — LANCETS
EACH MISCELLANEOUS
Qty: 100 EACH | Refills: 1 | Status: SHIPPED | OUTPATIENT
Start: 2020-05-18 | End: 2021-03-23 | Stop reason: SDUPTHER

## 2020-05-18 RX ORDER — LANCETS
EACH MISCELLANEOUS
Qty: 100 EACH | Refills: 0 | Status: SHIPPED | OUTPATIENT
Start: 2020-05-18 | End: 2020-05-18 | Stop reason: SDUPTHER

## 2020-05-18 RX ORDER — FOLIC ACID 1 MG/1
1 TABLET ORAL DAILY
Qty: 90 TABLET | Refills: 1 | Status: SHIPPED | OUTPATIENT
Start: 2020-05-18 | End: 2020-10-12 | Stop reason: SDUPTHER

## 2020-05-20 ENCOUNTER — VIRTUAL VISIT (OUTPATIENT)
Dept: FAMILY MEDICINE CLINIC | Age: 80
End: 2020-05-20
Payer: MEDICARE

## 2020-05-20 VITALS — WEIGHT: 190.04 LBS | HEIGHT: 63 IN | BODY MASS INDEX: 33.67 KG/M2

## 2020-05-20 PROCEDURE — G0438 PPPS, INITIAL VISIT: HCPCS | Performed by: FAMILY MEDICINE

## 2020-05-20 ASSESSMENT — PATIENT HEALTH QUESTIONNAIRE - PHQ9
SUM OF ALL RESPONSES TO PHQ QUESTIONS 1-9: 2
SUM OF ALL RESPONSES TO PHQ QUESTIONS 1-9: 2

## 2020-05-20 ASSESSMENT — LIFESTYLE VARIABLES: HOW OFTEN DO YOU HAVE A DRINK CONTAINING ALCOHOL: 0

## 2020-05-20 NOTE — PATIENT INSTRUCTIONS
Personalized Preventive Plan for Giulia Boles - 5/20/2020  Medicare offers a range of preventive health benefits. Some of the tests and screenings are paid in full while other may be subject to a deductible, co-insurance, and/or copay. Some of these benefits include a comprehensive review of your medical history including lifestyle, illnesses that may run in your family, and various assessments and screenings as appropriate. After reviewing your medical record and screening and assessments performed today your provider may have ordered immunizations, labs, imaging, and/or referrals for you. A list of these orders (if applicable) as well as your Preventive Care list are included within your After Visit Summary for your review. Other Preventive Recommendations:    · A preventive eye exam performed by an eye specialist is recommended every 1-2 years to screen for glaucoma; cataracts, macular degeneration, and other eye disorders. · A preventive dental visit is recommended every 6 months. · Try to get at least 150 minutes of exercise per week or 10,000 steps per day on a pedometer . · Order or download the FREE \"Exercise & Physical Activity: Your Everyday Guide\" from [employer]. Call 5-340.966.1162 or search [employer] online. · You need 4361-1830 mg of calcium and 7261-4956 IU of vitamin D per day. It is possible to meet your calcium requirement with diet alone, but a vitamin D supplement is usually necessary to meet this goal.  · When exposed to the sun, use a sunscreen that protects against both UVA and UVB radiation with an SPF of 30 or greater. Reapply every 2 to 3 hours or after sweating, drying off with a towel, or swimming. · Always wear a seat belt when traveling in a car. Always wear a helmet when riding a bicycle or motorcycle.

## 2020-05-20 NOTE — PROGRESS NOTES
Historical Provider, MD   Cholecalciferol (VITAMIN D) 2000 UNITS CAPS capsule Take 1 capsule by mouth. Yes Historical Provider, MD   levothyroxine (SYNTHROID) 50 MCG tablet Take 1 tablet by mouth Daily  Molina Orellana MD       Past Medical History:   Diagnosis Date    Atrial fibrillation ([address] 01/27/2016    per holter monitor    Back pain     severe degeneration    Gallstone     H/O cardiovascular stress test 8/12/2014    cardiolite-normal, no ischemia, EF70%    H/O Doppler ultrasound 10/1/14    Carotid doppler. No hemodynamically significant stenosis bilaterally.  H/O echocardiogram 08/12/2014    EF >57%, mild mitral and tricuspid regurg, normal LV systolic but abnormal diastolic function, no pericardial effusion.  History of Holter monitoring 1/27/2016    new onset afib    Leg cramping     Recurrent urinary tract infection     Spinal stenosis of lumbar region     Vitamin B12 deficiency     Vitamin D deficiency        No past surgical history on file. Family History   Problem Relation Age of Onset    High Blood Pressure Sister     Heart Disease Brother        CareTeam (Including outside providers/suppliers regularly involved in providing care):   Patient Care Team:  Molina Orellana MD as PCP - General (Family Medicine)  Molina Orellana MD as PCP - [facility] Empaneled Provider  Toma Buerger, MD as Consulting Physician (Cardiology)    Wt Readings from Last 3 Encounters:   05/20/20 190 lb 0.6 oz (86.2 kg)   10/23/19 190 lb (86.2 kg)   09/27/18 190 lb (86.2 kg)     Vitals:    05/20/20 1519   Weight: 190 lb 0.6 oz (86.2 kg)   Height: 5' 2.99\" (1.6 m)     Body mass index is 33.67 kg/m². Based upon direct observation of the patient, evaluation of cognition reveals remote memory intact, recent memory impaired. Patient's complete Health Risk Assessment and screening values have been reviewed and are found in Flowsheets.  The following problems were reviewed today and where indicated

## 2020-05-21 ENCOUNTER — NURSE ONLY (OUTPATIENT)
Dept: FAMILY MEDICINE CLINIC | Age: 80
End: 2020-05-21
Payer: MEDICARE

## 2020-05-21 DIAGNOSIS — E55.9 VITAMIN D DEFICIENCY: ICD-10-CM

## 2020-05-21 DIAGNOSIS — E53.8 VITAMIN B12 DEFICIENCY: ICD-10-CM

## 2020-05-21 DIAGNOSIS — E11.9 TYPE 2 DIABETES MELLITUS WITHOUT COMPLICATION, WITHOUT LONG-TERM CURRENT USE OF INSULIN (HCC): ICD-10-CM

## 2020-05-21 LAB
INTERNATIONAL NORMALIZATION RATIO, POC: 2.6
PROTHROMBIN TIME, POC: NORMAL
VITAMIN B-12: 1592 PG/ML (ref 211–911)
VITAMIN D 25-HYDROXY: 52.9 NG/ML

## 2020-05-21 PROCEDURE — 85610 PROTHROMBIN TIME: CPT | Performed by: FAMILY MEDICINE

## 2020-05-21 PROCEDURE — 99024 POSTOP FOLLOW-UP VISIT: CPT | Performed by: FAMILY MEDICINE

## 2020-05-22 LAB
ESTIMATED AVERAGE GLUCOSE: 122.6 MG/DL
HBA1C MFR BLD: 5.9 %

## 2020-06-18 ENCOUNTER — NURSE ONLY (OUTPATIENT)
Dept: FAMILY MEDICINE CLINIC | Age: 80
End: 2020-06-18
Payer: MEDICARE

## 2020-06-18 LAB
INTERNATIONAL NORMALIZATION RATIO, POC: 2.2
PROTHROMBIN TIME, POC: NORMAL

## 2020-06-18 PROCEDURE — 85610 PROTHROMBIN TIME: CPT | Performed by: FAMILY MEDICINE

## 2020-07-16 ENCOUNTER — NURSE ONLY (OUTPATIENT)
Dept: FAMILY MEDICINE CLINIC | Age: 80
End: 2020-07-16
Payer: MEDICARE

## 2020-07-16 LAB
INTERNATIONAL NORMALIZATION RATIO, POC: 2.1
PROTHROMBIN TIME, POC: NORMAL

## 2020-07-16 PROCEDURE — 85610 PROTHROMBIN TIME: CPT | Performed by: FAMILY MEDICINE

## 2020-07-16 PROCEDURE — 99024 POSTOP FOLLOW-UP VISIT: CPT | Performed by: FAMILY MEDICINE

## 2020-07-29 RX ORDER — WARFARIN SODIUM 2 MG/1
2 TABLET ORAL DAILY
Qty: 30 TABLET | Refills: 0 | Status: SHIPPED | OUTPATIENT
Start: 2020-07-29 | End: 2020-07-31

## 2020-07-29 NOTE — TELEPHONE ENCOUNTER
Requested Prescriptions     Signed Prescriptions Disp Refills    warfarin (COUMADIN) 2 MG tablet 30 tablet 0     Sig: Take 1 tablet by mouth daily     Authorizing Provider: Martha Ann     Ordering User: Tati Solomon

## 2020-07-31 RX ORDER — WARFARIN SODIUM 2 MG/1
TABLET ORAL
Qty: 30 TABLET | Refills: 0 | Status: SHIPPED | OUTPATIENT
Start: 2020-07-31 | End: 2020-10-05 | Stop reason: SDUPTHER

## 2020-08-10 ENCOUNTER — TELEPHONE (OUTPATIENT)
Dept: FAMILY MEDICINE CLINIC | Age: 80
End: 2020-08-10

## 2020-08-10 LAB
BILIRUBIN, POC: ABNORMAL
BLOOD URINE, POC: 6
CLARITY, POC: ABNORMAL
COLOR, POC: ABNORMAL
GLUCOSE URINE, POC: 100
KETONES, POC: ABNORMAL
LEUKOCYTE EST, POC: ABNORMAL
NITRITE, POC: ABNORMAL
PH, POC: 6
PROTEIN, POC: 300
SPECIFIC GRAVITY, POC: 1.03
UROBILINOGEN, POC: 0.2

## 2020-08-10 PROCEDURE — 81002 URINALYSIS NONAUTO W/O SCOPE: CPT | Performed by: FAMILY MEDICINE

## 2020-08-10 RX ORDER — WARFARIN SODIUM 1 MG/1
TABLET ORAL
Qty: 30 TABLET | Refills: 3 | Status: SHIPPED | OUTPATIENT
Start: 2020-08-10 | End: 2021-09-29

## 2020-08-10 RX ORDER — AMOXICILLIN 500 MG/1
1000 TABLET, FILM COATED ORAL 2 TIMES DAILY
Qty: 28 TABLET | Refills: 0 | Status: SHIPPED | OUTPATIENT
Start: 2020-08-10 | End: 2020-08-17

## 2020-08-10 RX ORDER — AMOXICILLIN 500 MG/1
1000 TABLET, FILM COATED ORAL 2 TIMES DAILY
Qty: 28 TABLET | Refills: 0 | Status: SHIPPED | OUTPATIENT
Start: 2020-08-10 | End: 2020-08-10 | Stop reason: SDUPTHER

## 2020-08-10 NOTE — TELEPHONE ENCOUNTER
Spoke with pt's dtr Tank Barrera informed per dr [place] Maloney note. Per mike \"pt uses [address], pt currently taking coumadin does she need to adjust dose while taking ATB? \"  [facility]    Per dr Khurram Andrade" have pt take half dose of what she is taking now, recheck pt inr 10 days after finishing atb\"    Tank Barrera voiced understanding, stated \"pt has 2mg coumadin tablets on hand. Schedule alternating 1 pill/1/2 pill. So should be half pill/quarter pill? \"  Informed will also call in 1mg coumadin tablets.  dtr agreed and voiced understanding  Requested Prescriptions     Signed Prescriptions Disp Refills    warfarin (COUMADIN) 1 MG tablet 30 tablet 3     Sig: Alternating 1 whole pill and then 1/2 pill every other day     Authorizing Provider: Marnie Paula     Ordering User: LORRI Alarcon    Amoxicillin 500 MG TABS 28 tablet 0     Sig: Take 1,000 mg by mouth 2 times daily for 7 days     Authorizing Provider: Marnie Paula     Ordering User: Jeremias Ford

## 2020-08-18 ENCOUNTER — TELEPHONE (OUTPATIENT)
Dept: FAMILY MEDICINE CLINIC | Age: 80
End: 2020-08-18

## 2020-08-18 NOTE — TELEPHONE ENCOUNTER
Pt finished antibotic yesterday-- is still having some symptoms-- mike would like to know if we can reorder another dose? Does she need to be seen?

## 2020-08-19 NOTE — TELEPHONE ENCOUNTER
Lets try different antibiotic. We will try Bactrim DS 1 twice daily for 7 days #14 no refill. Janet if you can decrease her Coumadin while she is on the antibiotic the same and then restart the normal dosing once the Bactrim is gone. Thanks, if symptoms do not resolve with this, we need to see her in the office.

## 2020-08-21 ENCOUNTER — TELEPHONE (OUTPATIENT)
Dept: FAMILY MEDICINE CLINIC | Age: 80
End: 2020-08-21

## 2020-08-21 RX ORDER — SULFAMETHOXAZOLE AND TRIMETHOPRIM 800; 160 MG/1; MG/1
1 TABLET ORAL 2 TIMES DAILY
Qty: 14 TABLET | Refills: 0 | Status: SHIPPED | OUTPATIENT
Start: 2020-08-21 | End: 2020-08-28

## 2020-09-11 ENCOUNTER — APPOINTMENT (OUTPATIENT)
Dept: CT IMAGING | Age: 80
End: 2020-09-11
Payer: MEDICARE

## 2020-09-11 ENCOUNTER — HOSPITAL ENCOUNTER (EMERGENCY)
Age: 80
Discharge: HOME | End: 2020-09-12
Attending: EMERGENCY MEDICINE
Payer: MEDICARE

## 2020-09-11 LAB
ALBUMIN SERPL-MCNC: 4.1 GM/DL (ref 3.4–5)
ALP BLD-CCNC: 77 IU/L (ref 40–129)
ALT SERPL-CCNC: <5 U/L (ref 10–40)
AMMONIA: 25 UMOL/L (ref 11–51)
ANION GAP SERPL CALCULATED.3IONS-SCNC: 11 MMOL/L (ref 4–16)
AST SERPL-CCNC: 9 IU/L (ref 15–37)
BACTERIA: ABNORMAL /HPF
BASE EXCESS MIXED: 1.5 (ref 0–2.3)
BASOPHILS ABSOLUTE: 0.1 K/CU MM
BASOPHILS RELATIVE PERCENT: 0.9 % (ref 0–1)
BILIRUB SERPL-MCNC: 0.4 MG/DL (ref 0–1)
BILIRUBIN URINE: NEGATIVE MG/DL
BLOOD, URINE: NEGATIVE
BUN BLDV-MCNC: 27 MG/DL (ref 6–23)
CALCIUM SERPL-MCNC: 9.2 MG/DL (ref 8.3–10.6)
CHLORIDE BLD-SCNC: 103 MMOL/L (ref 99–110)
CLARITY: CLEAR
CO2: 25 MMOL/L (ref 21–32)
COLOR: ABNORMAL
COMMENT: ABNORMAL
CREAT SERPL-MCNC: 0.9 MG/DL (ref 0.6–1.1)
DIFFERENTIAL TYPE: ABNORMAL
EOSINOPHILS ABSOLUTE: 0.1 K/CU MM
EOSINOPHILS RELATIVE PERCENT: 2.3 % (ref 0–3)
GFR AFRICAN AMERICAN: >60 ML/MIN/1.73M2
GFR NON-AFRICAN AMERICAN: 60 ML/MIN/1.73M2
GLUCOSE BLD-MCNC: 100 MG/DL (ref 70–99)
GLUCOSE, URINE: NEGATIVE MG/DL
HCO3 VENOUS: 27.7 MMOL/L (ref 19–25)
HCT VFR BLD CALC: 40.1 % (ref 37–47)
HEMOGLOBIN: 13.1 GM/DL (ref 12.5–16)
IMMATURE NEUTROPHIL %: 0.4 % (ref 0–0.43)
INR BLD: 1.13 INDEX
KETONES, URINE: ABNORMAL MG/DL
LACTATE: 1.3 MMOL/L (ref 0.4–2)
LEUKOCYTE ESTERASE, URINE: ABNORMAL
LIPASE: 73 IU/L (ref 13–60)
LYMPHOCYTES ABSOLUTE: 1.7 K/CU MM
LYMPHOCYTES RELATIVE PERCENT: 30 % (ref 24–44)
MCH RBC QN AUTO: 33.6 PG (ref 27–31)
MCHC RBC AUTO-ENTMCNC: 32.7 % (ref 32–36)
MCV RBC AUTO: 102.8 FL (ref 78–100)
MONOCYTES ABSOLUTE: 0.8 K/CU MM
MONOCYTES RELATIVE PERCENT: 13.8 % (ref 0–4)
MUCUS: ABNORMAL HPF
NITRITE URINE, QUANTITATIVE: NEGATIVE
NUCLEATED RBC %: 0 %
O2 SAT, VEN: 84.6 % (ref 50–70)
PCO2, VEN: 49 MMHG (ref 38–52)
PDW BLD-RTO: 12.5 % (ref 11.7–14.9)
PH VENOUS: 7.36 (ref 7.32–7.42)
PH, URINE: 5 (ref 5–8)
PLATELET # BLD: 228 K/CU MM (ref 140–440)
PMV BLD AUTO: 9.8 FL (ref 7.5–11.1)
PO2, VEN: 49 MMHG (ref 28–48)
POTASSIUM SERPL-SCNC: 4.1 MMOL/L (ref 3.5–5.1)
PROTEIN UA: 30 MG/DL
PROTHROMBIN TIME: 13.7 SECONDS (ref 11.7–14.5)
RBC # BLD: 3.9 M/CU MM (ref 4.2–5.4)
RBC URINE: ABNORMAL /HPF (ref 0–6)
SEGMENTED NEUTROPHILS ABSOLUTE COUNT: 3 K/CU MM
SEGMENTED NEUTROPHILS RELATIVE PERCENT: 52.6 % (ref 36–66)
SODIUM BLD-SCNC: 139 MMOL/L (ref 135–145)
SPECIFIC GRAVITY UA: 1.03 (ref 1–1.03)
SQUAMOUS EPITHELIAL: 7 /HPF
TOTAL IMMATURE NEUTOROPHIL: 0.02 K/CU MM
TOTAL NUCLEATED RBC: 0 K/CU MM
TOTAL PROTEIN: 6.6 GM/DL (ref 6.4–8.2)
TRANSITIONAL EPITHELIAL: <1 /HPF
TRICHOMONAS: ABNORMAL /HPF
UROBILINOGEN, URINE: NORMAL MG/DL (ref 0.2–1)
WBC # BLD: 5.6 K/CU MM (ref 4–10.5)
WBC UA: 4 /HPF (ref 0–5)

## 2020-09-11 PROCEDURE — 36415 COLL VENOUS BLD VENIPUNCTURE: CPT

## 2020-09-11 PROCEDURE — 71275 CT ANGIOGRAPHY CHEST: CPT

## 2020-09-11 PROCEDURE — 85610 PROTHROMBIN TIME: CPT

## 2020-09-11 PROCEDURE — 83605 ASSAY OF LACTIC ACID: CPT

## 2020-09-11 PROCEDURE — 85025 COMPLETE CBC W/AUTO DIFF WBC: CPT

## 2020-09-11 PROCEDURE — 93005 ELECTROCARDIOGRAM TRACING: CPT | Performed by: EMERGENCY MEDICINE

## 2020-09-11 PROCEDURE — 6360000004 HC RX CONTRAST MEDICATION: Performed by: EMERGENCY MEDICINE

## 2020-09-11 PROCEDURE — 87086 URINE CULTURE/COLONY COUNT: CPT

## 2020-09-11 PROCEDURE — 80053 COMPREHEN METABOLIC PANEL: CPT

## 2020-09-11 PROCEDURE — 70450 CT HEAD/BRAIN W/O DYE: CPT

## 2020-09-11 PROCEDURE — 81001 URINALYSIS AUTO W/SCOPE: CPT

## 2020-09-11 PROCEDURE — 83690 ASSAY OF LIPASE: CPT

## 2020-09-11 PROCEDURE — 99285 EMERGENCY DEPT VISIT HI MDM: CPT

## 2020-09-11 PROCEDURE — 82805 BLOOD GASES W/O2 SATURATION: CPT

## 2020-09-11 PROCEDURE — 82140 ASSAY OF AMMONIA: CPT

## 2020-09-11 RX ORDER — SODIUM CHLORIDE 0.9 % (FLUSH) 0.9 %
10 SYRINGE (ML) INJECTION 2 TIMES DAILY
Status: DISCONTINUED | OUTPATIENT
Start: 2020-09-11 | End: 2020-09-12 | Stop reason: HOSPADM

## 2020-09-11 RX ORDER — ACETAMINOPHEN 325 MG/1
650 TABLET ORAL ONCE
Status: COMPLETED | OUTPATIENT
Start: 2020-09-12 | End: 2020-09-12

## 2020-09-11 RX ADMIN — IOPAMIDOL 75 ML: 755 INJECTION, SOLUTION INTRAVENOUS at 23:11

## 2020-09-11 ASSESSMENT — ENCOUNTER SYMPTOMS
BACK PAIN: 0
RHINORRHEA: 0
NAUSEA: 0
VOMITING: 0
ABDOMINAL PAIN: 0
COUGH: 0
EYE REDNESS: 0
SHORTNESS OF BREATH: 0
SORE THROAT: 0

## 2020-09-11 NOTE — ED PROVIDER NOTES
Triage Chief Complaint:   Altered Mental Status    [place]:  David Cain is a 78 y.o. female that presents with episode of confusion and headache. She states she woke up from a nap today and was confused for a while after she woke up from the nap. She then had a headache that she states was slight but came and went very quickly. No headache currently. She denies any nausea or vomiting. No fevers. No cough or shortness of breath. She recently stopped her warfarin and then restarted it. She also recently had a UTI and was treated with a course of antibiotics. Antibiotics have been completed. Patient has a history of Parkinson's disease and denies any new weakness. No numbness or tingling. ROS:   Review of Systems   Constitutional: Negative for chills and fever. HENT: Negative for congestion, rhinorrhea and sore throat. Eyes: Negative for redness and visual disturbance. Respiratory: Negative for cough and shortness of breath. Cardiovascular: Negative for chest pain and leg swelling. Gastrointestinal: Negative for abdominal pain, nausea and vomiting. Genitourinary: Negative for dysuria and frequency. Musculoskeletal: Negative for arthralgias and back pain. Skin: Negative for rash and wound. Neurological: Positive for headaches. Negative for dizziness, syncope, weakness and light-headedness. Psychiatric/Behavioral: Positive for confusion. Negative for hallucinations and suicidal ideas. Past Medical History:   Diagnosis Date    Atrial fibrillation ([facility] [address] 01/27/2016    per holter monitor    Back pain     severe degeneration    Gallstone     H/O cardiovascular stress test 8/12/2014    cardiolite-normal, no ischemia, EF70%    H/O Doppler ultrasound 10/1/14    Carotid doppler. No hemodynamically significant stenosis bilaterally.     H/O echocardiogram 08/12/2014    EF >57%, mild mitral and tricuspid regurg, normal LV systolic but abnormal diastolic function, no pericardial effusion.  History of Holter monitoring 1/27/2016    new onset afib    Leg cramping     Recurrent urinary tract infection     Spinal stenosis of lumbar region     Vitamin B12 deficiency     Vitamin D deficiency      History reviewed. No pertinent surgical history. Family History   Problem Relation Age of Onset    High Blood Pressure Sister     Heart Disease Brother      Social History     Socioeconomic History    Marital status:      Spouse name: Not on file    Number of children: Not on file    Years of education: Not on file    Highest education level: Not on file   Occupational History    Not on file   Social Needs    Financial resource strain: Not on file    Food insecurity     Worry: Not on file     Inability: Not on file    Transportation needs     Medical: Not on file     Non-medical: Not on file   Tobacco Use    Smoking status: Never Smoker    Smokeless tobacco: Never Used   Substance and Sexual Activity    Alcohol use: No    Drug use: No    Sexual activity: Not on file   Lifestyle    Physical activity     Days per week: Not on file     Minutes per session: Not on file    Stress: Not on file   Relationships    Social connections     Talks on phone: Not on file     Gets together: Not on file     Attends Gnosticism service: Not on file     Active member of club or organization: Not on file     Attends meetings of clubs or organizations: Not on file     Relationship status: Not on file    Intimate partner violence     Fear of current or ex partner: Not on file     Emotionally abused: Not on file     Physically abused: Not on file     Forced sexual activity: Not on file   Other Topics Concern    Not on file   Social History Narrative    Not on file     No current facility-administered medications for this encounter.       Current Outpatient Medications   Medication Sig Dispense Refill    warfarin (COUMADIN) 1 MG tablet Alternating 1 whole pill and then 1/2 pill every other day HENT:      Head: Normocephalic and atraumatic. Eyes:      General:         Right eye: No discharge. Left eye: No discharge. Conjunctiva/sclera: Conjunctivae normal.   Cardiovascular:      Rate and Rhythm: Normal rate and regular rhythm. Pulmonary:      Effort: Pulmonary effort is normal. No respiratory distress. Breath sounds: Normal breath sounds. Abdominal:      General: There is no distension. Palpations: Abdomen is soft. Tenderness: There is no abdominal tenderness. Musculoskeletal: Normal range of motion. General: No swelling or signs of injury. Skin:     General: Skin is warm and dry. Neurological:      General: No focal deficit present. Mental Status: She is alert. Cranial Nerves: No cranial nerve deficit. Motor: Weakness (mild, generalized, no focal) present.       Comments: + Intention tremors   Psychiatric:         Mood and Affect: Mood normal.         Behavior: Behavior normal.         I have reviewed and interpreted all of the currently available lab results from this visit (if applicable):  Results for orders placed or performed during the hospital encounter of 09/11/20   CBC Auto Differential   Result Value Ref Range    WBC 5.6 4.0 - 10.5 K/CU MM    RBC 3.90 (L) 4.2 - 5.4 M/CU MM    Hemoglobin 13.1 12.5 - 16.0 GM/DL    Hematocrit 40.1 37 - 47 %    .8 (H) 78 - 100 FL    MCH 33.6 (H) 27 - 31 PG    MCHC 32.7 32.0 - 36.0 %    RDW 12.5 11.7 - 14.9 %    Platelets 338 492 - 129 K/CU MM    MPV 9.8 7.5 - 11.1 FL    Differential Type AUTOMATED DIFFERENTIAL     Segs Relative 52.6 36 - 66 %    Lymphocytes % 30.0 24 - 44 %    Monocytes % 13.8 (H) 0 - 4 %    Eosinophils % 2.3 0 - 3 %    Basophils % 0.9 0 - 1 %    Segs Absolute 3.0 K/CU MM    Lymphocytes Absolute 1.7 K/CU MM    Monocytes Absolute 0.8 K/CU MM    Eosinophils Absolute 0.1 K/CU MM    Basophils Absolute 0.1 K/CU MM    Nucleated RBC % 0.0 %    Total Nucleated RBC 0.0 K/CU MM    Total Immature Neutrophil 0.02 K/CU MM    Immature Neutrophil % 0.4 0 - 0.43 %   Comprehensive Metabolic Panel w/ Reflex to MG   Result Value Ref Range    Sodium 139 135 - 145 MMOL/L    Potassium 4.1 3.5 - 5.1 MMOL/L    Chloride 103 99 - 110 mMol/L    CO2 25 21 - 32 MMOL/L    BUN 27 (H) 6 - 23 MG/DL    CREATININE 0.9 0.6 - 1.1 MG/DL    Glucose 100 (H) 70 - 99 MG/DL    Calcium 9.2 8.3 - 10.6 MG/DL    Alb 4.1 3.4 - 5.0 GM/DL    Total Protein 6.6 6.4 - 8.2 GM/DL    Total Bilirubin 0.4 0.0 - 1.0 MG/DL    ALT <5 (L) 10 - 40 U/L    AST 9 (L) 15 - 37 IU/L    Alkaline Phosphatase 77 40 - 129 IU/L    GFR Non-African American 60 (L) >60 mL/min/1.73m2    GFR African American >60 >60 mL/min/1.73m2    Anion Gap 11 4 - 16   Lipase   Result Value Ref Range    Lipase 73 (H) 13 - 60 IU/L   Urinalysis, reflex to microscopic   Result Value Ref Range    Color, UA VALERIA (A) YELLOW    Clarity, UA CLEAR CLEAR    Glucose, Urine NEGATIVE NEGATIVE MG/DL    Bilirubin Urine NEGATIVE NEGATIVE MG/DL    Ketones, Urine SMALL (A) NEGATIVE MG/DL    Specific Gravity, UA 1.030 1.001 - 1.035    Blood, Urine NEGATIVE NEGATIVE    pH, Urine 5.0 5.0 - 8.0    Protein, UA 30 (A) NEGATIVE MG/DL    Urobilinogen, Urine NORMAL 0.2 - 1.0 MG/DL    Nitrite Urine, Quantitative NEGATIVE NEGATIVE    Leukocyte Esterase, Urine TRACE (A) NEGATIVE    RBC, UA NONE SEEN 0 - 6 /HPF    WBC, UA 4 0 - 5 /HPF    Bacteria, UA RARE (A) NEGATIVE /HPF    Squam Epithel, UA 7 /HPF    Trans Epithel, UA <1 /HPF    Mucus, UA RARE (A) NEGATIVE HPF    Trichomonas, UA NONE SEEN NONE SEEN /HPF   Ammonia Level   Result Value Ref Range    Ammonia 25 11 - 51 UMOL/L   Lactic Acid, Plasma   Result Value Ref Range    Lactate 1.3 0.4 - 2.0 mMOL/L   Blood Gas, Venous   Result Value Ref Range    pH, Jose 7.36 7.32 - 7.42    pCO2, Jose 49 38 - 52 mmHG    pO2, Jose 49 (H) 28 - 48 mmHG    Base Exc, Mixed 1.5 0 - 2.3    HCO3, Venous 27.7 (H) 19 - 25 MMOL/L    O2 Sat, Jose 84.6 (H) 50 - 70 %    Comment VBG    PT - INR   Result Value Ref Range    Protime 13.7 11.7 - 14.5 SECONDS    INR 1.13 INDEX   EKG 12 Lead   Result Value Ref Range    Ventricular Rate 72 BPM    Atrial Rate 72 BPM    P-R Interval 220 ms    QRS Duration 82 ms    Q-T Interval 412 ms    QTc Calculation (Bazett) 451 ms    P Axis 59 degrees    R Axis -27 degrees    T Axis 33 degrees    Diagnosis       Sinus rhythm with 1st degree AV block  Minimal voltage criteria for LVH, may be normal variant  Borderline ECG  No previous ECGs available        Radiographs (if obtained):  [] The following radiograph was interpreted by myself in the absence of a radiologist:  [x]Radiologist's Report Reviewed:  CT HEAD WO CONTRAST   Final Result   No acute intracranial abnormality. CTA PULMONARY W CONTRAST    (Results Pending)         EKG (if obtained): (All EKG's are interpreted by myself in the absence of a cardiologist)  Sinus rhythm with a first-degree AV block. Rate of 72. SD interval 220, QRS 82, QTc 451. No ST elevations or depressions. Normal T waves. Questionable left ventricular hypertrophy. Impression: Abnormal EKG. And compared to previous EKG from 12/22/2017, there are no significant changes. MDM:  Differential diagnoses considered include but are not limited to electrolyte abnormality, transient hypoglycemia, urinary tract infection, delirium, acute intracranial hemorrhage. Patient is now back to her baseline. No increased confusion. I did speak with her daughter who states she is slightly confused and she wakes with naps normally but this lasted for longer and she was more confused than normal.  Basic labs were obtained and are unremarkable. Normal glucose level. Urine has rare bacteria but is not concerning for an infection. We will send it for culture but will hold antibiotics at this point time. Normal ammonia level. Lactic acid level is normal.  Venous blood gas is unremarkable.   CT scan of her head shows no acute intracranial abnormalities. Patient's INR is significantly subtherapeutic. Due to this will obtain a CT scan to rule out pulmonary embolism. I discussed with the patient and her daughter in the room. 10:16 PM EDT  I have signed out [id number] [facility] Emergency Department care to Dr. Josselyn Asencio. We discussed the pertinent history, physical exam, completed/pending test results (if applicable) and current treatment plan. Please refer to his/her chart for the patients remaining Emergency Department course and final disposition. I did don appropriate PPE (including N95 face mask, protective eye ware/safety glasses, gloves, hair covering, and no isolation gown), as recommended by the health facility/national standard best practice, during my bedside interactions with the patient. Clinical Impression:  1. Episode of confusion    2. Subtherapeutic anticoagulation          Carolin Liao MD       Please note that portions of this note may have been complete with a voice recognition program.  Effortswere made to edit the dictations, but occasional words are mis-transcribed.           Carolin Liao MD  09/11/20 [id number]

## 2020-09-11 NOTE — ED NOTES
Bed: ED-25  Expected date:   Expected time:   Means of arrival:   Comments:  Confusion and headache     Ivett Valdes [facility], [facility]  09/11/20 1920

## 2020-09-11 NOTE — ED TRIAGE NOTES
PER EMS: pt states she woke up today very confused, unsure where she was and what was going on. pts baseline is completely normal, pt has parkinson's, daughter is on her way in. 3 weeks ago pt had a UTI, was on abx and taken off of warfarin. Pt recently started warfarin again after abx completed.

## 2020-09-12 VITALS
OXYGEN SATURATION: 95 % | HEART RATE: 73 BPM | RESPIRATION RATE: 13 BRPM | SYSTOLIC BLOOD PRESSURE: 166 MMHG | DIASTOLIC BLOOD PRESSURE: 62 MMHG | TEMPERATURE: 97.8 F

## 2020-09-12 PROCEDURE — 93010 ELECTROCARDIOGRAM REPORT: CPT | Performed by: INTERNAL MEDICINE

## 2020-09-12 PROCEDURE — U0002 COVID-19 LAB TEST NON-CDC: HCPCS

## 2020-09-12 PROCEDURE — 6370000000 HC RX 637 (ALT 250 FOR IP): Performed by: EMERGENCY MEDICINE

## 2020-09-12 RX ADMIN — ACETAMINOPHEN 650 MG: 325 TABLET ORAL at 00:18

## 2020-09-12 ASSESSMENT — PAIN SCALES - GENERAL: PAINLEVEL_OUTOF10: 5

## 2020-09-12 NOTE — ED NOTES
Report given to [facility]; care transferred at this time.       Shelton Gonzalez, RN  09/11/20 [id number]

## 2020-09-12 NOTE — ED NOTES
Called to pt rm by pt and her daughter , pt c/o rt hip pain at 5 . Wants off her hip , pt rolled to lt side , pillow from under pt head placed under her rt hip , pain not any bettr , pt asking for pain meds , usually takes tylenol . Skin w/d pink a/ox4.  Pt daughter placed her sweater under pt head , no pillow available at this time     Mann Worthy RN  09/11/20 [id number]

## 2020-09-12 NOTE — ED PROVIDER NOTES
Patient signed out to me by Dr. Diego Atkinson,     [id number] with h/o Parkinson's disease, took a nap this afternoon like typical, when woke up was confused. Now back to baseline, oriented, follows commands. Is on coumadin, had decreased dose as was on antibiotics for UTI recently, had increased back to her normal dose. INR is 1.1. pH is normal, no hypercarbia, normal electrolytes. Urine sent for culture but 7 squams, contaminated sample and not suggestive of infection. no SOB. No CP. No palpitations. She does not wish to stay in the hospital and is at her baseline. Awaiting CT angio pulm and plan for discharge home if negative. Daughter is here as well and agreeable to plan. Silvio Duffy MD  09/11/20 1553      CT angios pulmonary shows no evidence of a pulmonary embolus. There are patchy groundglass opacities in both lungs, particularly in the peripheral right upper lobe. She is not having any shortness of breath, not having any fevers, not having any cough or other infectious symptoms. She is not having leg swelling or symptoms of heart failure. These types of features can be seen with COVID-19 pneumonia, it is very nonspecific though. I did explain this to the patient and daughter. I have offered COVID-19 testing, it is possible that she is asymptomatic currently but could become symptomatic, we discussed this and they would like to proceed with testing. They are still comfortable going home, patient prefers to go home. We did have a long discussion for if she develops any type of symptoms that she would need reevaluated given her underlying comorbidities. They expressed understanding of this. She is subtherapeutic for her INR, we discussed the current Coumadin regimen that she is on. She was previously on a 2 mg, 1 mg regimen where she would take each every other day.   They had decreased this to 1 mg and 1/2 mg alternating days while she was on the antibiotics and had continued that for 10 days after her antibiotics. She has been back on her 2 mg, 1 mg alternating days for the last 4 days and was to have it rechecked next week. INR is 1.1, she was due to take 1 mg, I have advised that I would want her to do 2 mg for 2 consecutive days and then go back to her alternating and have her INR checked on Monday. Daughter expressed understanding of this. They are comfortable going home, all questions have been answered. She would like Tylenol for her hip pain, she had not had any of her chronic medications while here and does have Parkinson's and some contracture. Plan will be for discharge home after COVID testing.        Houston Estevez MD  09/12/20 0003

## 2020-09-13 LAB
CULTURE: NORMAL
Lab: NORMAL
SARS-COV-2: NOT DETECTED
SOURCE: NORMAL
SPECIMEN: NORMAL

## 2020-09-14 ENCOUNTER — OFFICE VISIT (OUTPATIENT)
Dept: FAMILY MEDICINE CLINIC | Age: 80
End: 2020-09-14
Payer: MEDICARE

## 2020-09-14 VITALS
OXYGEN SATURATION: 97 % | DIASTOLIC BLOOD PRESSURE: 74 MMHG | SYSTOLIC BLOOD PRESSURE: 130 MMHG | TEMPERATURE: 97.2 F | HEART RATE: 67 BPM

## 2020-09-14 PROCEDURE — 99214 OFFICE O/P EST MOD 30 MIN: CPT | Performed by: FAMILY MEDICINE

## 2020-09-15 LAB
EKG ATRIAL RATE: 72 BPM
EKG DIAGNOSIS: NORMAL
EKG P AXIS: 59 DEGREES
EKG P-R INTERVAL: 220 MS
EKG Q-T INTERVAL: 412 MS
EKG QRS DURATION: 82 MS
EKG QTC CALCULATION (BAZETT): 451 MS
EKG R AXIS: -27 DEGREES
EKG T AXIS: 33 DEGREES
EKG VENTRICULAR RATE: 72 BPM

## 2020-09-15 NOTE — PROGRESS NOTES
9/14/2020    [facility]    Chief Complaint   Patient presents with   Carmenza Valle Other     er f/u confusion    Other     inr 9/11/20 inr 1.13. per er doctor . currently taking 2/2/1mg per er rec. HPI    Korin Salazar is a 78 y.o. female who presents today with follow-up. Patient to follow-up in the emergency room. She awoke confused and was not clearing. Patient occasionally does this but it seemed to be beyond patient's normal.  She does have a history of urinary tract infections and had been half dosed on her Coumadin due to antibiotic use for 2 straight urinary tract infections. Patient has been counseled to remain remain on half dosing for an additional 10 days after being off the antibiotics. I had discussion with daughter that in the future would not recommend doing that. As soon as the antibiotics are complete patient is return to her normal Coumadin dosing. That is the way I have always done it for this patient and all my patients. I discussed with daughter there is no literature about this specifically although the additional 10 days seems to have been an abundance of caution. Patient's ER evaluation was entirely normal.  Head CT and chest CT showed no injury from a thrombus. Minimal amylase elevation unexplainable and asymptomatic. Patient was given 1 extra 2 mg dose on her Coumadin otherwise restarted on her 2 alternating with 1 baseline Coumadin which I asked them to continue for an additional 2 weeks before rechecking.     REVIEW OF SYSTEMS    Constitutional:  Denies fever, chills, weight loss or weakness  Eyes:  no photophobia or discharge  ENT:  no sore throat or ear pain  Cardiovascular:  Denies chest pain, palpitations or swelling  Respiratory:  Denies cough or shortness of breath  GI:  no abdominal pain, nausea, vomiting, or diarrhea  Musculoskeletal:  no back pain  Skin:  No rashes  Neurologic:  no headache, focal weakness, or sensory changes  Endocrine:  no polyuria or polydipsia      PAST MEDICAL HISTORY  Past Medical History:   Diagnosis Date    Atrial fibrillation ([facility] [address] 01/27/2016    per holter monitor    Back pain     severe degeneration    Gallstone     H/O cardiovascular stress test 8/12/2014    cardiolite-normal, no ischemia, EF70%    H/O Doppler ultrasound 10/1/14    Carotid doppler. No hemodynamically significant stenosis bilaterally.  H/O echocardiogram 08/12/2014    EF >57%, mild mitral and tricuspid regurg, normal LV systolic but abnormal diastolic function, no pericardial effusion.     History of Holter monitoring 1/27/2016    new onset afib    Leg cramping     Recurrent urinary tract infection     Spinal stenosis of lumbar region     Vitamin B12 deficiency     Vitamin D deficiency        FAMILY HISTORY  Family History   Problem Relation Age of Onset    High Blood Pressure Sister     Heart Disease Brother        SOCIAL HISTORY  Social History     Socioeconomic History    Marital status:      Spouse name: Not on file    Number of children: Not on file    Years of education: Not on file    Highest education level: Not on file   Occupational History    Not on file   Social Needs    Financial resource strain: Not on file    Food insecurity     Worry: Not on file     Inability: Not on file    Transportation needs     Medical: Not on file     Non-medical: Not on file   Tobacco Use    Smoking status: Never Smoker    Smokeless tobacco: Never Used   Substance and Sexual Activity    Alcohol use: No    Drug use: No    Sexual activity: Not on file   Lifestyle    Physical activity     Days per week: Not on file     Minutes per session: Not on file    Stress: Not on file   Relationships    Social connections     Talks on phone: Not on file     Gets together: Not on file     Attends Uatsdin service: Not on file     Active member of club or organization: Not on file     Attends meetings of clubs or organizations: Not on file     Relationship status: Not on file    Intimate partner violence     Fear of current or ex partner: Not on file     Emotionally abused: Not on file     Physically abused: Not on file     Forced sexual activity: Not on file   Other Topics Concern    Not on file   Social History Narrative    Not on file        SURGICAL HISTORY  No past surgical history on file. CURRENT MEDICATIONS  Current Outpatient Medications   Medication Sig Dispense Refill    warfarin (COUMADIN) 1 MG tablet Alternating 1 whole pill and then 1/2 pill every other day 30 tablet 3    warfarin (COUMADIN) 2 MG tablet TAKE 1 TABLET BY MOUTH ONCE DAILY 30 tablet 0    folic acid (FOLVITE) 1 MG tablet Take 1 tablet by mouth daily 90 tablet 1    Accu-Chek Softclix Lancets MISC USE 1 LANCET ONCE A  each 1    metFORMIN (GLUCOPHAGE) 500 MG tablet 1 with evening meds. 90 tablet 1    baclofen (LIORESAL) 10 MG tablet TAKE ONE-HALF (1/2) TABLET BY MOUTH SIX (6) TIMES A DAY AND ONE (1) TABLET BY MOUTH IN EVENING 300 tablet 0    carbidopa-levodopa-entacapone (STALEVO 100) -200 MG TABS Pt takes 1 tablet  5 times daily (Patient taking differently: 3 times daily Pt takes 1 tablet  5 times daily) 450 tablet 1    levothyroxine (SYNTHROID) 50 MCG tablet Take 1 tablet by mouth Daily 90 tablet 1    amantadine (SYMMETREL) 100 MG capsule Take 1 capsule by mouth daily 90 capsule 1    blood glucose test strips (ACCU-CHEK HIEN PLUS) strip TEST BLOOD SUGAR DAILY AS NEEDED 100 strip 3    acetaminophen (TYLENOL) 325 MG tablet Take 650 mg by mouth 3 times daily       vitamin B-12 (CYANOCOBALAMIN) 1000 MCG tablet Take 1,000 mcg by mouth daily      polyethylene glycol (GLYCOLAX) powder Take 17 g by mouth daily   5    Cholecalciferol (VITAMIN D) 2000 UNITS CAPS capsule Take 1 capsule by mouth. No current facility-administered medications for this visit.         ALLERGIES  Allergies   Allergen Reactions    Ciprofibrate     Ciprofloxacin Other (See Comments) Anorexia      Pregabalin        PHYSICAL EXAM    /74   Pulse 67   Temp 97.2 °F (36.2 °C)   SpO2 97%     Constitutional:  Well developed, well nourished  HEENT:  Normocephalic, atraumatic, bilateral external ears normal, oropharynx moist, nose normal masklike facese a little worse than baseline  Neck:  Normal range of motion, no tenderness, supple  Lymphatic:  No lymphadenopathy noted  Cardiovascular:  Normal heart rate, S1S2 nl  Thorax & Lungs:  Normal breath sounds, no respiratory distress, no wheezing  Skin:  Warm, dry, no erythema, no rash  Back: Patient very stiff and continues leaning towards her right  Extremities:  No edema, no tenderness, no cyanosis  Musculoskeletal: See above  Neurologic:  Alert & oriented X 3      ASSESSMENT & PLAN    1. Mental status change resolved  This could have been patient's baseline Parkinson's with medication confusion. It does not appear to be urinary tract infection. There is no evidence for embolization or thrombus but cannot rule that out. See discussion of anticoagulants above. Patient to return to baseline Coumadin. Recheck in 2 weeks. 2. Parkinson disease ([address]  I the patient is late on her medicines or symptoms are progressive. 3. Pulmonary embolus, right (HCC)  Confirmed diagnoses. Patient on appropriate treatment    4. Atrial fibrillation, unspecified type ([address]  Confirmed current diagnoses and see anticoagulation above.     Follow-up routine appointment    Electronically signed by Alyssa Garrett MD on 9/14/2020

## 2020-09-28 ENCOUNTER — NURSE ONLY (OUTPATIENT)
Dept: FAMILY MEDICINE CLINIC | Age: 80
End: 2020-09-28
Payer: MEDICARE

## 2020-09-28 LAB
INTERNATIONAL NORMALIZATION RATIO, POC: 2.1
PROTHROMBIN TIME, POC: NORMAL

## 2020-09-28 PROCEDURE — 85610 PROTHROMBIN TIME: CPT | Performed by: FAMILY MEDICINE

## 2020-09-28 PROCEDURE — 99024 POSTOP FOLLOW-UP VISIT: CPT | Performed by: FAMILY MEDICINE

## 2020-10-06 RX ORDER — WARFARIN SODIUM 2 MG/1
TABLET ORAL
Qty: 30 TABLET | Refills: 0 | Status: SHIPPED | OUTPATIENT
Start: 2020-10-06 | End: 2020-11-12 | Stop reason: SDUPTHER

## 2020-10-06 RX ORDER — AMANTADINE HYDROCHLORIDE 100 MG/1
100 CAPSULE, GELATIN COATED ORAL DAILY
Qty: 90 CAPSULE | Refills: 1 | Status: SHIPPED | OUTPATIENT
Start: 2020-10-06 | End: 2021-03-12 | Stop reason: SDUPTHER

## 2020-10-12 ENCOUNTER — TELEMEDICINE (OUTPATIENT)
Dept: FAMILY MEDICINE CLINIC | Age: 80
End: 2020-10-12
Payer: MEDICARE

## 2020-10-12 ENCOUNTER — TELEPHONE (OUTPATIENT)
Dept: FAMILY MEDICINE CLINIC | Age: 80
End: 2020-10-12

## 2020-10-12 PROCEDURE — 99442 PR PHYS/QHP TELEPHONE EVALUATION 11-20 MIN: CPT | Performed by: FAMILY MEDICINE

## 2020-10-12 RX ORDER — FOLIC ACID 1 MG/1
1 TABLET ORAL DAILY
Qty: 90 TABLET | Refills: 1 | Status: SHIPPED | OUTPATIENT
Start: 2020-10-12 | End: 2021-07-20

## 2020-10-12 RX ORDER — LEVOTHYROXINE SODIUM 0.05 MG/1
50 TABLET ORAL DAILY
Qty: 90 TABLET | Refills: 1 | Status: SHIPPED | OUTPATIENT
Start: 2020-10-12 | End: 2021-04-14 | Stop reason: SDUPTHER

## 2020-10-12 NOTE — TELEPHONE ENCOUNTER
Daughter said [facility] does not have an [facility] for a nurse to come to patient's home, so could you put an order in for [address] to come out to the house so they can draw patient's labs? Please call Celso Sin to let her know what you decide so she knows what to expect.

## 2020-10-13 NOTE — TELEPHONE ENCOUNTER
Returned call spoke with Nick Jason, dtr requesting home care eval along with having home care do check pt inr. dtr worried specialist will take a long time to get into and dtr can hardly get pt into the car.  [id number] [address] for eval & bw?

## 2020-10-13 NOTE — PROGRESS NOTES
Neyda Leal is a 78 y.o. female evaluated via telephone on 10/12/2020. Consent:  She and/or health care decision maker is aware that that she may receive a bill for this telephone service, depending on her insurance coverage, and has provided verbal consent to proceed: Yes      Documentation:  I communicated with the patient and/or health care decision maker about dizziness, progressively worsening ambulation this time falling to the left, Parkinson's features with out neurologists diagnosing Parkinson's  . Details of this discussion including any medical advice provided:    Daughter Brianna Casillas is unable to bring her mother in for an appointment therefore changed to a phone visit. Brianna Casillas is a physical therapist noting that her mom is pushing off to the left for the first time although has no other isolating symptoms of a stroke. Patient is to get her INR checked this week in our office. Concerning only patient has had severe features of Parkinson's that atypically Sinemet makes patient feel very tired and has minimal improvement of her symptoms but reportedly some. Desmond is stymied to have anything to assist her mom has the more Sinemet she gives the more sedated her mom's. Neurology is only medication that have been offered is Sinemet. We discussed that if mom is unable to come in for an INR this week then patient is to let us know and we would asked home health care to go out and check the INR. At the same time they can assess patient for equipment if needed such as a Demetrius lift and somehow the ability to transport her back and forth. I have suggested a neurologic second opinion from [facility]'s movement disorders clinic. Brianna Casillas is not ready to do that at this time. Unfortunately there is often a long waiting list for that clinic. Patient to keep her regular appointment.       I affirm this is a Patient Initiated Episode with a Patient who has not had a related appointment within my department in the past 7 days or scheduled within the next 24 hours.     Patient identification was verified at the start of the visit: Yes    Total Time: minutes: 11-20 minutes    Note: not billable if this call serves to triage the patient into an appointment for the relevant concern      Adán Humphreys

## 2020-10-14 ENCOUNTER — TELEPHONE (OUTPATIENT)
Dept: FAMILY MEDICINE CLINIC | Age: 80
End: 2020-10-14

## 2020-10-14 NOTE — TELEPHONE ENCOUNTER
Spoke with Jesus boyd.  Gave verbal order home care eval & treat, draw pt inr    Faxed orders to [phone]    Pt dtr Bren Adan informed

## 2020-10-14 NOTE — TELEPHONE ENCOUNTER
Progress notes 10/12 were not signed and dated from [facility]-- please do so and refax    [phone] Fax

## 2020-10-20 ENCOUNTER — ANTI-COAG VISIT (OUTPATIENT)
Dept: FAMILY MEDICINE CLINIC | Age: 80
End: 2020-10-20

## 2020-10-20 ENCOUNTER — TELEPHONE (OUTPATIENT)
Dept: FAMILY MEDICINE CLINIC | Age: 80
End: 2020-10-20

## 2020-10-20 LAB — INR BLD: 2.1

## 2020-11-02 ENCOUNTER — TELEMEDICINE (OUTPATIENT)
Dept: CARDIOLOGY CLINIC | Age: 80
End: 2020-11-02
Payer: MEDICARE

## 2020-11-02 PROCEDURE — 1123F ACP DISCUSS/DSCN MKR DOCD: CPT | Performed by: INTERNAL MEDICINE

## 2020-11-02 PROCEDURE — 4040F PNEUMOC VAC/ADMIN/RCVD: CPT | Performed by: INTERNAL MEDICINE

## 2020-11-02 PROCEDURE — 99214 OFFICE O/P EST MOD 30 MIN: CPT | Performed by: INTERNAL MEDICINE

## 2020-11-02 PROCEDURE — G8400 PT W/DXA NO RESULTS DOC: HCPCS | Performed by: INTERNAL MEDICINE

## 2020-11-02 PROCEDURE — 1090F PRES/ABSN URINE INCON ASSESS: CPT | Performed by: INTERNAL MEDICINE

## 2020-11-02 PROCEDURE — G8427 DOCREV CUR MEDS BY ELIG CLIN: HCPCS | Performed by: INTERNAL MEDICINE

## 2020-11-02 NOTE — PROGRESS NOTES
CARDIOLOGY NOTE      11/2/2020    RE: Scarlet Clements  (1940)                               TO:  Dr. Huy Treviño MD            Renetta Perera is a 78 y.o. female who was seen today for management of  A fib    Fu on ed visit                                    HPI:   Per daughter                The patient does not have cardiac complaints  But per daughter had ? tia  Patient also seen  for    - Atrial fibrillation, pt is  compliant with meds. Patient does not have symptoms from atrial fibrillation  - Diabetes mellitus, blood glucose level is  well controlled. Pt is compliant with meds and diet        Scarlet Clements has the following history recorded in care path:  Patient Active Problem List    Diagnosis Date Noted    Pulmonary embolus, right ([address] 08/01/2015     Priority: High    Left hemiplegia ([address] 01/31/2020    Spinal stenosis of lumbar region     Vitamin D deficiency     Vitamin B12 deficiency     Gallstone     Recurrent urinary tract infection     Atrial fibrillation ([address] 01/27/2016    Parkinson disease [facility]     Right leg DVT ([address] 08/01/2015    Type 2 diabetes mellitus without complication, without long-term current use of insulin ([facility] 07/21/2015    Acquired hypothyroidism 07/21/2015    H/O echocardiogram 08/12/2014    Syncope and collapse 07/17/2014    Weakness 07/17/2014     Current Outpatient Medications   Medication Sig Dispense Refill    levothyroxine (SYNTHROID) 50 MCG tablet Take 1 tablet by mouth Daily 90 tablet 1    folic acid (FOLVITE) 1 MG tablet Take 1 tablet by mouth daily 90 tablet 1    metFORMIN (GLUCOPHAGE) 500 MG tablet 1 with evening meds.  90 tablet 1    warfarin (COUMADIN) 2 MG tablet TAKE 1 TABLET BY MOUTH ONCE DAILY 30 tablet 0    amantadine (SYMMETREL) 100 MG capsule Take 1 capsule by mouth daily 90 capsule 1    warfarin (COUMADIN) 1 MG tablet Alternating 1 whole pill and then 1/2 pill every other day 30 tablet 3    Accu-Chek Softclix Lancets MISC USE 1 LANCET ONCE A  each 1    baclofen (LIORESAL) 10 MG tablet TAKE ONE-HALF (1/2) TABLET BY MOUTH SIX (6) TIMES A DAY AND ONE (1) TABLET BY MOUTH IN EVENING 300 tablet 0    carbidopa-levodopa-entacapone (STALEVO 100) -200 MG TABS Pt takes 1 tablet  5 times daily (Patient taking differently: 3 times daily Pt takes 1 tablet  5 times daily) 450 tablet 1    blood glucose test strips (ACCU-CHEK HIEN PLUS) strip TEST BLOOD SUGAR DAILY AS NEEDED 100 strip 3    acetaminophen (TYLENOL) 325 MG tablet Take 650 mg by mouth 3 times daily       vitamin B-12 (CYANOCOBALAMIN) 1000 MCG tablet Take 1,000 mcg by mouth daily      polyethylene glycol (GLYCOLAX) powder Take 17 g by mouth daily   5    Cholecalciferol (VITAMIN D) 2000 UNITS CAPS capsule Take 1 capsule by mouth. No current facility-administered medications for this visit. Allergies: Ciprofibrate; Ciprofloxacin; and Pregabalin  Past Medical History:   Diagnosis Date    Atrial fibrillation ([address] 01/27/2016    per holter monitor    Back pain     severe degeneration    Gallstone     H/O cardiovascular stress test 8/12/2014    cardiolite-normal, no ischemia, EF70%    H/O Doppler ultrasound 10/1/14    Carotid doppler. No hemodynamically significant stenosis bilaterally.  H/O echocardiogram 08/12/2014    EF >57%, mild mitral and tricuspid regurg, normal LV systolic but abnormal diastolic function, no pericardial effusion.  History of Holter monitoring 1/27/2016    new onset afib    Leg cramping     Recurrent urinary tract infection     Spinal stenosis of lumbar region     Vitamin B12 deficiency     Vitamin D deficiency      No past surgical history on file.    As reviewed   Family History   Problem Relation Age of Onset    High Blood Pressure Sister     Heart Disease Brother      Social History     Tobacco Use    Smoking status: Never Smoker    Smokeless tobacco: Never Used   Substance Use Topics  Alcohol use: No      Review of Systems:   From daughter  Constitutional: Negative for diaphoresis and fatigue  Psychological:Negative for anxiety or depression  HENT: Negative for headaches, nasal congestion, sinus pain or vertigo  Eyes: Negative for visual disturbance. Endocrine: Negative for polydipsia/polyuria  Respiratory: Negative for shortness of breath  Cardiovascular: Negative for chest pain, dyspnea on exertion, claudication, edema, irregular heartbeat, murmur, palpitations or shortness of breath  Gastrointestinal: Negative for abdominal pain or heartburn  Genito-Urinary: Negative for urinary frequency/urgency  Musculoskeletal: Negative for muscle pain, muscular weakness, negative for pain in arm and leg or swelling in foot and leg  Neurological: Negative for dizziness, headaches, memory loss, numbness/tingling, visual changes, syncope  Dermatological: Negative for rash    Objective: There were no vitals filed for this visit. There were no vitals taken for this visit. Patient-Reported Vitals 10/12/2020   Patient-Reported Systolic 254   Patient-Reported Diastolic 71   Patient-Reported Pulse 76   Patient-Reported Temperature 97%        Wt Readings from Last 3 Encounters:   05/20/20 190 lb 0.6 oz (86.2 kg)   10/23/19 190 lb (86.2 kg)   09/27/18 190 lb (86.2 kg)     There is no height or weight on file to calculate BMI. GENERAL - Alert, oriented, pleasant, in no apparent distress. EYES: No jaundice, no conjunctival pallor. SKIN: It is warm & dry. No rashes. No Echhymosis    HEENT - No clinically significant abnormalities seen. Neck - Supple.       Lab Review   Lab Results   Component Value Date    CKTOTAL 24 07/20/2015    TROPONINT <0.010 08/01/2015     BNP:  No results found for: BNP  PT/INR:    Lab Results   Component Value Date    INR 2.10 10/20/2020     Lab Results   Component Value Date    LABA1C 5.9 05/21/2020    LABA1C 5.6 01/31/2020     Lab Results   Component Value Date    WBC 5.6 09/11/2020    HCT 40.1 09/11/2020    .8 (H) 09/11/2020     09/11/2020     No results found for: CHOL, TRIG, HDL, LDLCALC, LDLDIRECT, CHOLHDLRATIO  Lab Results   Component Value Date    ALT <5 (L) 09/11/2020    AST 9 (L) 09/11/2020     BMP:    Lab Results   Component Value Date     09/11/2020    K 4.1 09/11/2020     09/11/2020    CO2 25 09/11/2020    BUN 27 09/11/2020    CREATININE 0.9 09/11/2020     CMP:   Lab Results   Component Value Date     09/11/2020    K 4.1 09/11/2020     09/11/2020    CO2 25 09/11/2020    BUN 27 09/11/2020    PROT 6.6 09/11/2020     TSH:    Lab Results   Component Value Date    TSH 1.770 07/13/2018    TSHHS 4.380 07/29/2015         Impression:    No diagnosis found. Patient Active Problem List   Diagnosis Code    Syncope and collapse R55    Weakness R53.1    H/O echocardiogram Z92.89    Type 2 diabetes mellitus without complication, without long-term current use of insulin (HCC) E11.9    Acquired hypothyroidism E03.9    Pulmonary embolus, right ([facility]) I26.99    Right leg DVT ([facility]) I82.401    Parkinson disease ([facility] G20    Atrial fibrillation ([facility]) I48.91    Spinal stenosis of lumbar region M48.061    Vitamin D deficiency E55.9    Vitamin B12 deficiency E53.8    Gallstone K80.20    Recurrent urinary tract infection N39.0    Left hemiplegia ([facility]) G81.94       Assessment & Plan:    - Atrial fibrillation, pt is  compliant with meds. Patient does not have symptoms from atrial fibrillation    echo    -   DIABETES MELLITUS: Available pertinent lab data reviewed   and  patient was given dietary advice . Advised to check blood glucose level on a regular basis. -   Changes  in medicines made: No           - fu on ed visit     I confirm that this visit was completed in a telehealth setting ,using synchronous audiovisual technology for real time patient interaction . The patient identity with name and date of birth was confirmed .  This evaluation of patient was done by telehealth in the setting of [address] emergency , which precluded assurance of safe in person visit at the time of service. The patient consented to and accepts potential risks associated with telemedical evaluation and care was taken to assess tanesha presence of any medical issues that would be more  appropriate for expedited in -person care. Pursuant to the emergency declaration under the [address] [place] [address] waiver authority and the [employer] Act, this Virtual  Visit was conducted, with patient's consent, to reduce the patient's risk of exposure to COVID-19 and provide continuity of care for an established patient. Services were provided through a video synchronous discussion virtually to substitute for in-person clinic visit. I Confirm this is a Patient Initiated Episode with an Established Patient who has not had a related appointment within my department in the past 7 days or scheduled within the next 24 hours. The call was not tied to face to face office visit or procedure that has occurred in in prior 7 days.   Based on our conversation /evaluation , a subsequent office visit for the patient's problem is not indicated for  24 hrs      Time spent; 25 m  Location; [facility], [address], [address] [facility]  Office staff ie MA were utilised   javier John MD    [facility]

## 2020-11-12 RX ORDER — WARFARIN SODIUM 2 MG/1
TABLET ORAL
Qty: 30 TABLET | Refills: 0 | Status: SHIPPED | OUTPATIENT
Start: 2020-11-12 | End: 2020-12-28 | Stop reason: SDUPTHER

## 2020-11-12 NOTE — TELEPHONE ENCOUNTER
Requested Prescriptions     Signed Prescriptions Disp Refills    warfarin (COUMADIN) 2 MG tablet 30 tablet 0     Sig: TAKE 1 TABLET BY MOUTH ONCE DAILY     Authorizing Provider: Elizabeth Maguire     Ordering User: Giuliana Escamilla

## 2020-11-13 ENCOUNTER — TELEPHONE (OUTPATIENT)
Dept: FAMILY MEDICINE CLINIC | Age: 80
End: 2020-11-13

## 2020-11-13 ENCOUNTER — ANTI-COAG VISIT (OUTPATIENT)
Dept: FAMILY MEDICINE CLINIC | Age: 80
End: 2020-11-13

## 2020-11-13 LAB — INR BLD: 2.2

## 2020-11-13 NOTE — TELEPHONE ENCOUNTER
Spoke with Janelle Baca, confirmed current coumadin dose 2/1mg.  inr 11/13/20 at 2.2 and is doing 2/1/2/1 and keep same recheck in 4 weeks

## 2020-12-04 ENCOUNTER — PROCEDURE VISIT (OUTPATIENT)
Dept: CARDIOLOGY CLINIC | Age: 80
End: 2020-12-04
Payer: MEDICARE

## 2020-12-04 ENCOUNTER — NURSE ONLY (OUTPATIENT)
Dept: CARDIOLOGY CLINIC | Age: 80
End: 2020-12-04
Payer: MEDICARE

## 2020-12-04 LAB
LV EF: 50 %
LVEF MODALITY: NORMAL

## 2020-12-04 PROCEDURE — 93306 TTE W/DOPPLER COMPLETE: CPT | Performed by: INTERNAL MEDICINE

## 2020-12-04 PROCEDURE — 93880 EXTRACRANIAL BILAT STUDY: CPT | Performed by: INTERNAL MEDICINE

## 2020-12-08 ENCOUNTER — TELEPHONE (OUTPATIENT)
Dept: CARDIOLOGY CLINIC | Age: 80
End: 2020-12-08

## 2020-12-08 NOTE — TELEPHONE ENCOUNTER
Advised patient of results. Left Ventricle Ejection fraction is visually estimated at 50%. Mild Septal asymmetric hypertrophy. Grade II diastolic dysfunction. Mildly dilated left atrium. Normal pulmonary artery pressure with RVSP at 14 mmHg   No evidence of pericardial effusion. Mild (0-49%) disease of the Bilateral Internal carotid artery. Normal vertebral flow.

## 2020-12-11 ENCOUNTER — TELEPHONE (OUTPATIENT)
Dept: FAMILY MEDICINE CLINIC | Age: 80
End: 2020-12-11

## 2020-12-11 LAB — INR BLD: 2.6

## 2020-12-14 ENCOUNTER — ANTI-COAG VISIT (OUTPATIENT)
Dept: FAMILY MEDICINE CLINIC | Age: 80
End: 2020-12-14

## 2020-12-15 ENCOUNTER — TELEPHONE (OUTPATIENT)
Dept: CARDIOLOGY CLINIC | Age: 80
End: 2020-12-15

## 2020-12-15 PROCEDURE — 93228 REMOTE 30 DAY ECG REV/REPORT: CPT | Performed by: INTERNAL MEDICINE

## 2020-12-15 NOTE — TELEPHONE ENCOUNTER
Attempted to call patient no answer. Left message with results of monitor, if any questions or concerns asked to call the office.     PREDOMINANT RHYTHM IS SR  SHORT RUNS OF SVT  NO A FIB NOTED  CONT WITH BETA BLOCKER

## 2020-12-28 RX ORDER — WARFARIN SODIUM 2 MG/1
TABLET ORAL
Qty: 30 TABLET | Refills: 0 | Status: SHIPPED | OUTPATIENT
Start: 2020-12-28 | End: 2021-01-29

## 2020-12-28 RX ORDER — CARBIDOPA, LEVODOPA AND ENTACAPONE 25; 200; 100 MG/1; MG/1; MG/1
TABLET, FILM COATED ORAL
Qty: 450 TABLET | Refills: 1 | Status: SHIPPED | OUTPATIENT
Start: 2020-12-28 | End: 2021-06-17 | Stop reason: SDUPTHER

## 2020-12-28 RX ORDER — BLOOD SUGAR DIAGNOSTIC
STRIP MISCELLANEOUS
Qty: 100 STRIP | Refills: 3 | Status: SHIPPED | OUTPATIENT
Start: 2020-12-28 | End: 2022-01-31

## 2020-12-28 NOTE — TELEPHONE ENCOUNTER
Requested Prescriptions     Signed Prescriptions Disp Refills    carbidopa-levodopa-entacapone (STALEVO 100) -200 MG TABS 450 tablet 1     Sig: Pt takes 1 tablet  5 times daily     Authorizing Provider: Christelle Vasquez     Ordering User: LORRI Gracia    warfarin (COUMADIN) 2 MG tablet 30 tablet 0     Sig: TAKE 1 TABLET BY MOUTH ONCE DAILY     Authorizing Provider: Christelle Vasquez     Ordering User: Vinay Ramirez

## 2020-12-28 NOTE — TELEPHONE ENCOUNTER
Patient only has 1 youin left - daughter called because the pharmacy does not have the script nor have they heard from our office. Please advise.

## 2020-12-28 NOTE — TELEPHONE ENCOUNTER
Requested Prescriptions     Signed Prescriptions Disp Refills    blood glucose test strips (ACCU-CHEK HIEN PLUS) strip 100 strip 3     Sig: TEST BLOOD SUGAR DAILY AS NEEDED     Authorizing Provider: Horace Aquino     Ordering User: Júnior Chung

## 2020-12-28 NOTE — TELEPHONE ENCOUNTER
Please specify how many time she needs to test daily - this information is needed for the pharmacy to fill the prescription.

## 2021-01-07 ENCOUNTER — NURSE ONLY (OUTPATIENT)
Dept: FAMILY MEDICINE CLINIC | Age: 81
End: 2021-01-07
Payer: MEDICARE

## 2021-01-07 DIAGNOSIS — I48.91 ATRIAL FIBRILLATION, UNSPECIFIED TYPE (HCC): Primary | ICD-10-CM

## 2021-01-07 LAB
INTERNATIONAL NORMALIZATION RATIO, POC: 2.4
PROTHROMBIN TIME, POC: NORMAL

## 2021-01-07 PROCEDURE — 85610 PROTHROMBIN TIME: CPT | Performed by: FAMILY MEDICINE

## 2021-01-29 DIAGNOSIS — Z79.01 MONITORING FOR ANTICOAGULANT USE: ICD-10-CM

## 2021-01-29 DIAGNOSIS — Z51.81 MONITORING FOR ANTICOAGULANT USE: ICD-10-CM

## 2021-01-29 RX ORDER — WARFARIN SODIUM 2 MG/1
TABLET ORAL
Qty: 30 TABLET | Refills: 0 | Status: SHIPPED | OUTPATIENT
Start: 2021-01-29 | End: 2021-03-12 | Stop reason: SDUPTHER

## 2021-02-04 ENCOUNTER — NURSE ONLY (OUTPATIENT)
Dept: FAMILY MEDICINE CLINIC | Age: 81
End: 2021-02-04
Payer: MEDICARE

## 2021-02-04 DIAGNOSIS — I26.99 PULMONARY EMBOLUS, RIGHT (HCC): ICD-10-CM

## 2021-02-04 LAB
INTERNATIONAL NORMALIZATION RATIO, POC: 2.6
PROTHROMBIN TIME, POC: NORMAL

## 2021-02-04 PROCEDURE — 85610 PROTHROMBIN TIME: CPT | Performed by: FAMILY MEDICINE

## 2021-02-12 ENCOUNTER — ANTI-COAG VISIT (OUTPATIENT)
Dept: FAMILY MEDICINE CLINIC | Age: 81
End: 2021-02-12

## 2021-02-12 DIAGNOSIS — I26.99 PULMONARY EMBOLUS, RIGHT (HCC): ICD-10-CM

## 2021-03-04 ENCOUNTER — TELEPHONE (OUTPATIENT)
Dept: FAMILY MEDICINE CLINIC | Age: 81
End: 2021-03-04

## 2021-03-04 ENCOUNTER — NURSE ONLY (OUTPATIENT)
Dept: FAMILY MEDICINE CLINIC | Age: 81
End: 2021-03-04
Payer: MEDICARE

## 2021-03-04 DIAGNOSIS — I26.99 PULMONARY EMBOLUS, RIGHT (HCC): ICD-10-CM

## 2021-03-04 LAB
INTERNATIONAL NORMALIZATION RATIO, POC: 2.6
PROTHROMBIN TIME, POC: NORMAL

## 2021-03-04 PROCEDURE — 85610 PROTHROMBIN TIME: CPT | Performed by: FAMILY MEDICINE

## 2021-03-04 NOTE — LETTER
[address] [place] [address] Karen Sheth  Phone: [phone]  Fax: [phone]    Kasie Paul MD        March 5, 2021     Patient: Edilma Bhagat   YOB: 1940   Date of Visit: 3/4/2021       To Whom It May Concern: It is my medical opinion that [facility] requires a disability parking placard for the following reasons:  She cannot walk without assistance from another person or the use of an assistance device (cane, crutch, prosthetic device, wheelchair, etc.). Duration of need: 5 years    If you have any questions or concerns, please don't hesitate to call.     Sincerely,        Kasie Paul MD

## 2021-03-12 DIAGNOSIS — Z79.01 MONITORING FOR ANTICOAGULANT USE: ICD-10-CM

## 2021-03-12 DIAGNOSIS — G20 PARKINSON DISEASE (HCC): ICD-10-CM

## 2021-03-12 DIAGNOSIS — Z51.81 MONITORING FOR ANTICOAGULANT USE: ICD-10-CM

## 2021-03-12 RX ORDER — WARFARIN SODIUM 2 MG/1
TABLET ORAL
Qty: 30 TABLET | Refills: 0 | Status: SHIPPED | OUTPATIENT
Start: 2021-03-12 | End: 2021-04-14 | Stop reason: SDUPTHER

## 2021-03-12 RX ORDER — AMANTADINE HYDROCHLORIDE 100 MG/1
100 CAPSULE, GELATIN COATED ORAL DAILY
Qty: 90 CAPSULE | Refills: 1 | Status: SHIPPED | OUTPATIENT
Start: 2021-03-12 | End: 2021-10-18 | Stop reason: SDUPTHER

## 2021-03-23 DIAGNOSIS — E11.9 TYPE 2 DIABETES MELLITUS WITHOUT COMPLICATION, WITHOUT LONG-TERM CURRENT USE OF INSULIN (HCC): ICD-10-CM

## 2021-03-23 RX ORDER — LANCETS
EACH MISCELLANEOUS
Qty: 100 EACH | Refills: 2 | Status: SHIPPED | OUTPATIENT
Start: 2021-03-23 | End: 2021-11-10

## 2021-04-06 ENCOUNTER — NURSE ONLY (OUTPATIENT)
Dept: FAMILY MEDICINE CLINIC | Age: 81
End: 2021-04-06
Payer: MEDICARE

## 2021-04-06 ENCOUNTER — ANTI-COAG VISIT (OUTPATIENT)
Dept: FAMILY MEDICINE CLINIC | Age: 81
End: 2021-04-06

## 2021-04-06 DIAGNOSIS — I26.99 PULMONARY EMBOLUS, RIGHT (HCC): ICD-10-CM

## 2021-04-06 LAB
INTERNATIONAL NORMALIZATION RATIO, POC: 3.1
PROTHROMBIN TIME, POC: NORMAL

## 2021-04-06 PROCEDURE — 85610 PROTHROMBIN TIME: CPT | Performed by: FAMILY MEDICINE

## 2021-04-27 ENCOUNTER — NURSE ONLY (OUTPATIENT)
Dept: FAMILY MEDICINE CLINIC | Age: 81
End: 2021-04-27
Payer: MEDICARE

## 2021-04-27 ENCOUNTER — ANTI-COAG VISIT (OUTPATIENT)
Dept: FAMILY MEDICINE CLINIC | Age: 81
End: 2021-04-27

## 2021-04-27 DIAGNOSIS — I48.20 CHRONIC ATRIAL FIBRILLATION (HCC): ICD-10-CM

## 2021-04-27 DIAGNOSIS — I48.20 CHRONIC ATRIAL FIBRILLATION (HCC): Primary | ICD-10-CM

## 2021-04-27 LAB
INTERNATIONAL NORMALIZATION RATIO, POC: 3
PROTHROMBIN TIME, POC: NORMAL

## 2021-04-27 PROCEDURE — 85610 PROTHROMBIN TIME: CPT | Performed by: FAMILY MEDICINE

## 2021-05-03 ENCOUNTER — TELEPHONE (OUTPATIENT)
Dept: FAMILY MEDICINE CLINIC | Age: 81
End: 2021-05-03
Payer: MEDICARE

## 2021-05-03 DIAGNOSIS — R30.0 DYSURIA: Primary | ICD-10-CM

## 2021-05-03 PROCEDURE — 81002 URINALYSIS NONAUTO W/O SCOPE: CPT | Performed by: FAMILY MEDICINE

## 2021-05-03 NOTE — TELEPHONE ENCOUNTER
Spoke with page pt having painful urination voiding small amounts    Note to remember to check interaction atb with coumadin

## 2021-05-05 LAB
ORGANISM: ABNORMAL
URINE CULTURE, ROUTINE: ABNORMAL

## 2021-05-06 ENCOUNTER — TELEPHONE (OUTPATIENT)
Dept: FAMILY MEDICINE CLINIC | Age: 81
End: 2021-05-06

## 2021-05-06 RX ORDER — SULFAMETHOXAZOLE AND TRIMETHOPRIM 800; 160 MG/1; MG/1
1 TABLET ORAL 2 TIMES DAILY
Qty: 20 TABLET | Refills: 0 | Status: SHIPPED | OUTPATIENT
Start: 2021-05-06 | End: 2021-05-16

## 2021-05-06 NOTE — TELEPHONE ENCOUNTER
Please call patient or daughter and let them know what the lab culture results are for 5/3/21. Patient is still very uncomfortable. Please advise.

## 2021-05-17 ENCOUNTER — OFFICE VISIT (OUTPATIENT)
Dept: CARDIOLOGY CLINIC | Age: 81
End: 2021-05-17
Payer: MEDICARE

## 2021-05-17 VITALS
SYSTOLIC BLOOD PRESSURE: 140 MMHG | HEART RATE: 104 BPM | DIASTOLIC BLOOD PRESSURE: 80 MMHG | BODY MASS INDEX: 33.66 KG/M2 | HEIGHT: 63 IN | WEIGHT: 190 LBS

## 2021-05-17 DIAGNOSIS — I48.20 CHRONIC ATRIAL FIBRILLATION (HCC): Primary | ICD-10-CM

## 2021-05-17 PROCEDURE — G8427 DOCREV CUR MEDS BY ELIG CLIN: HCPCS | Performed by: INTERNAL MEDICINE

## 2021-05-17 PROCEDURE — G8417 CALC BMI ABV UP PARAM F/U: HCPCS | Performed by: INTERNAL MEDICINE

## 2021-05-17 PROCEDURE — 93000 ELECTROCARDIOGRAM COMPLETE: CPT | Performed by: INTERNAL MEDICINE

## 2021-05-17 PROCEDURE — 1090F PRES/ABSN URINE INCON ASSESS: CPT | Performed by: INTERNAL MEDICINE

## 2021-05-17 PROCEDURE — 1123F ACP DISCUSS/DSCN MKR DOCD: CPT | Performed by: INTERNAL MEDICINE

## 2021-05-17 PROCEDURE — 4040F PNEUMOC VAC/ADMIN/RCVD: CPT | Performed by: INTERNAL MEDICINE

## 2021-05-17 PROCEDURE — G8400 PT W/DXA NO RESULTS DOC: HCPCS | Performed by: INTERNAL MEDICINE

## 2021-05-17 PROCEDURE — 99213 OFFICE O/P EST LOW 20 MIN: CPT | Performed by: INTERNAL MEDICINE

## 2021-05-17 PROCEDURE — 1036F TOBACCO NON-USER: CPT | Performed by: INTERNAL MEDICINE

## 2021-05-17 NOTE — LETTER
[address] [facility]  1940  [id number]    Have you had any Chest Pain that is not new? - No     DO EKG IF: Patient has a Heart Rate above 100 or below 40     CAD (Coronary Artery Disease) patient should have one on file every 6 months        Have you had any Shortness of Breath - No    Have you had any dizziness - No       Sitting wait 5 minutes do supine (laying down) wait 5 minutes then do standing - log each in \"vitals\" area in Epic   Be sure to ask what symptoms they are having if they get dizzy while completing ortho stats such as: room spinning, nausea, etc.    Have you had any palpitations that are not new? - No      Do you have any edema - swelling in No        Vein \"LEG PROBLEM Questionnaire\"  1. Do you have prominent leg veins? No   2. Do you have any skin discoloration? No  3. Do you have any healed or active sores? No  4. Do you have swelling of the legs? No  5. Do you have a family history of varicose veins? No  6. Does your profession involve pro-longed        standing or heavy lifting? No  7. Have you been fighting overweight problems? No  8. Do you have restless legs? No  9. Do you have any night time cramps? No  10. Do you have any of the following in your legs:        NONE     When did you have your last labs drawn   Where did you have them done   What doctor ordered     If we do not have these labs you are retrieve these labs for these providers!     Do you have a surgery or procedure scheduled in the near future - No

## 2021-05-17 NOTE — PROGRESS NOTES
Jaquan Baltazar  is a  Established patient  ,78 y.o.   female here for evaluation of the following chief complaint(s):    Here for 6 mo fu        SUBJECTIVE/OBJECTIVE:  HPI : h/o  A fib, dm, CVA now here  Has no cardiac complains  Had UTI    Review of Systems    neg    Vitals:    05/17/21 1421 05/17/21 1431   BP: (!) 140/80 (!) 140/80   Pulse: 104    Weight: 190 lb (86.2 kg)    Height: 5' 3\" (1.6 m)      BP (!) 140/80   Pulse 104   Ht 5' 3\" (1.6 m)   Wt 190 lb (86.2 kg)   BMI 33.66 kg/m²   Patient-Reported Vitals 11/2/2020   Patient-Reported Weight 190lb   Patient-Reported Height 5 3   Patient-Reported Systolic 632   Patient-Reported Diastolic 64   Patient-Reported Pulse 63   Patient-Reported Temperature -   Patient-Reported SpO2 96     Wt Readings from Last 3 Encounters:   05/17/21 190 lb (86.2 kg)   05/20/20 190 lb 0.6 oz (86.2 kg)   10/23/19 190 lb (86.2 kg)     Body mass index is 33.66 kg/m². Physical Exam     Neck: JVD  no    Lungs : clear    Cardio : Si and S2 audilble      Ext: edema  no    All pertinent data reviewed  y    Meds : reviewed   y      Tests ordered    n    ASSESSMENT/PLAN:    -   DIABETES MELLITUS: Available pertinent lab data reviewed   and  patient was given dietary advice . Advised to check blood glucose level on a regular basis. -   Changes  in medicines made: No              - NOAC offered             - Atrial fibrillation, pt is  compliant with meds. Patient does not have symptoms from atrial fibrillation                          An electronic signature was used to authenticate this note.     --Jose Dale MD

## 2021-05-25 ENCOUNTER — ANTI-COAG VISIT (OUTPATIENT)
Dept: FAMILY MEDICINE CLINIC | Age: 81
End: 2021-05-25

## 2021-05-25 ENCOUNTER — NURSE ONLY (OUTPATIENT)
Dept: FAMILY MEDICINE CLINIC | Age: 81
End: 2021-05-25
Payer: MEDICARE

## 2021-05-25 DIAGNOSIS — I48.91 ATRIAL FIBRILLATION, UNSPECIFIED TYPE (HCC): Primary | ICD-10-CM

## 2021-05-25 DIAGNOSIS — I48.20 CHRONIC ATRIAL FIBRILLATION (HCC): ICD-10-CM

## 2021-05-25 LAB — INTERNATIONAL NORMALIZATION RATIO, POC: 2.2

## 2021-05-25 PROCEDURE — 85610 PROTHROMBIN TIME: CPT | Performed by: FAMILY MEDICINE

## 2021-05-27 ENCOUNTER — VIRTUAL VISIT (OUTPATIENT)
Dept: FAMILY MEDICINE CLINIC | Age: 81
End: 2021-05-27
Payer: MEDICARE

## 2021-05-27 DIAGNOSIS — Z00.00 ROUTINE GENERAL MEDICAL EXAMINATION AT A HEALTH CARE FACILITY: Primary | ICD-10-CM

## 2021-05-27 PROCEDURE — 1123F ACP DISCUSS/DSCN MKR DOCD: CPT | Performed by: PHYSICIAN ASSISTANT

## 2021-05-27 PROCEDURE — 4040F PNEUMOC VAC/ADMIN/RCVD: CPT | Performed by: PHYSICIAN ASSISTANT

## 2021-05-27 PROCEDURE — G0439 PPPS, SUBSEQ VISIT: HCPCS | Performed by: PHYSICIAN ASSISTANT

## 2021-05-27 SDOH — ECONOMIC STABILITY: FOOD INSECURITY: WITHIN THE PAST 12 MONTHS, YOU WORRIED THAT YOUR FOOD WOULD RUN OUT BEFORE YOU GOT MONEY TO BUY MORE.: NEVER TRUE

## 2021-05-27 ASSESSMENT — PATIENT HEALTH QUESTIONNAIRE - PHQ9
SUM OF ALL RESPONSES TO PHQ QUESTIONS 1-9: 1

## 2021-05-27 ASSESSMENT — LIFESTYLE VARIABLES: HOW OFTEN DO YOU HAVE A DRINK CONTAINING ALCOHOL: 0

## 2021-05-27 NOTE — PROGRESS NOTES
Medicare Annual Wellness Visit  Name: Kathie Menezes Date: 2021   MRN: [id number] Sex: Female   Age: 78 y.o. Ethnicity: Non-/Non    : 1940 Race: Buck Astudillo is here for Medicare AWV    Screenings for behavioral, psychosocial and functional/safety risks, and cognitive dysfunction are all negative except as indicated below. These results, as well as other patient data from the [address] [facility] ([address] form, are documented in Flowsheets linked to this Encounter. Allergies   Allergen Reactions    Ciprofibrate     Ciprofloxacin Other (See Comments)     Anorexia      Pregabalin        Prior to Visit Medications    Medication Sig Taking? Authorizing Provider   metFORMIN (GLUCOPHAGE) 500 MG tablet 1 with evening meds.  Yes Bren Gutierrez PA-C   levothyroxine (SYNTHROID) 50 MCG tablet Take 1 tablet by mouth Daily Yes Bren Gutierrez PA-C   warfarin (COUMADIN) 2 MG tablet TAKE 1 TABLET BY MOUTH ONCE DAILY Yes Bren Gutierrez PA-C   Accu-Chek Softclix Lancets MISC USE 1 LANCET ONCE A DAY Yes Anthony Parry MD   amantadine (SYMMETREL) 100 MG capsule Take 1 capsule by mouth daily Yes Anthony Parry MD   carbidopa-levodopa-entacapone (STALEVO 100) -200 MG TABS Pt takes 1 tablet  5 times daily  Patient taking differently: Pt takes 1 tablet  3 times daily Yes Anthony Parry MD   blood glucose test strips (ACCU-CHEK HIEN PLUS) strip TEST BLOOD SUGAR DAILY AS NEEDED Yes Anthony Parry MD   folic acid (FOLVITE) 1 MG tablet Take 1 tablet by mouth daily Yes Anthony Parry MD   warfarin (COUMADIN) 1 MG tablet Alternating 1 whole pill and then 1/2 pill every other day Yes Anthony Parry MD   baclofen (LIORESAL) 10 MG tablet TAKE ONE-HALF (1/2) TABLET BY MOUTH SIX (6) TIMES A DAY AND ONE (1) TABLET BY MOUTH IN EVENING Yes Anthony Parry MD   acetaminophen (TYLENOL) 325 MG tablet Take 650 mg by mouth 3 times daily  Yes Historical Provider, MD   vitamin B-12 (CYANOCOBALAMIN) 1000 MCG tablet Take 1,000 mcg by mouth daily Yes Historical Provider, MD   polyethylene glycol (GLYCOLAX) powder Take 17 g by mouth daily  Yes Historical Provider, MD   Cholecalciferol (VITAMIN D) 2000 UNITS CAPS capsule Take 1 capsule by mouth. Yes Historical Provider, MD       Past Medical History:   Diagnosis Date    Atrial fibrillation ([address] 01/27/2016    per holter monitor    Back pain     severe degeneration    Gallstone     H/O cardiovascular stress test 8/12/2014    cardiolite-normal, no ischemia, EF70%    H/O Doppler ultrasound 10/1/14    Carotid doppler. No hemodynamically significant stenosis bilaterally.  H/O echocardiogram 08/12/2014    EF >57%, mild mitral and tricuspid regurg, normal LV systolic but abnormal diastolic function, no pericardial effusion.  History of echocardiogram 12/04/2020    EF 50%, Mild septal asymmetric hypertrophy, Grade II DD, Mildly dilated left atrium, Normal pulmonary artery pressure, no pericardial effusion    History of Holter monitoring 1/27/2016    new onset afib    Hx of Carotid Doppler ultrasound 12/04/2020    Mild 0-49% disease of the bilateral ICA, Normal vertebral flow    Leg cramping     Recurrent urinary tract infection     Spinal stenosis of lumbar region     Vitamin B12 deficiency     Vitamin D deficiency        No past surgical history on file.     Family History   Problem Relation Age of Onset    High Blood Pressure Sister     Heart Disease Brother        CareTeam (Including outside providers/suppliers regularly involved in providing care):   Patient Care Team:  Lesli Washburn MD as PCP - General (Family Medicine)  Lesli Washburn MD as PCP - [facility] Empaneled Provider  Idania Cavanaugh MD as Consulting Physician (Cardiology)    Wt Readings from Last 3 Encounters:   05/17/21 190 lb (86.2 kg)   05/20/20 190 lb 0.6 oz (86.2 kg)   10/23/19 190 lb (86.2 kg)     There were no vitals filed for this visit. There is no height or weight on file to calculate BMI. Patient's daughter gave history. Patient is present on Speakerphone. Patient's complete Health Risk Assessment and screening values have been reviewed and are found in Flowsheets. The following problems were reviewed today and where indicated follow up appointments were made and/or referrals ordered. Positive Risk Factor Screenings with Interventions:          General Health and ACP:  General  In general, how would you say your health is?: Very Good  In the past 7 days, have you experienced any of the following? New or Increased Pain, New or Increased Fatigue, Loneliness, Social Isolation, Stress or Anger?: (!) New or Increased Fatigue  Do you get the social and emotional support that you need?: Yes  Do you have a Living Will?: Yes  Advance Directives     Power of  Living Will ACP-Advance Directive ACP-Power of     Not on File Filed on 08/13/15 Filed Not on File      General Health Risk Interventions:  Patient has been working with her neurologist to adjust medications to help with fatigue. Recommended she increase water intake.      Health Habits/Nutrition:  Health Habits/Nutrition  Do you exercise for at least 20 minutes 2-3 times per week?: Yes (pt does daily stretching and chair exercises)  Have you lost any weight without trying in the past 3 months?: No  Do you eat only one meal per day?: (!) Yes  Have you seen the dentist within the past year?: (!) No     Health Habits/Nutrition Interventions:  · Dental exam overdue:  patient encouraged to make appointment with his/her dentist    Hearing/Vision:  No exam data present  Hearing/Vision  Do you or your family notice any trouble with your hearing that hasn't been managed with hearing aids?: (!) Yes  Do you have difficulty driving, watching TV, or doing any of your daily activities because of your eyesight?: (!) Yes  Have you had an eye exam within the past year?: (!) No  Hearing/Vision Interventions:  · Hearing concerns:  patient declines any further evaluation/treatment for hearing issues  · Vision concerns:  patient encouraged to make appointment with his/her eye specialist    Safety:  Safety  Do you have working smoke detectors?: Yes  Have all throw rugs been removed or fastened?: (!) No  Do you have non-slip mats or surfaces in all bathtubs/showers?: Yes  Do all of your stairways have a railing or banister?: (!) No (no stairs)  Are your doorways, halls and stairs free of clutter?: Yes  Do you always fasten your seatbelt when you are in a car?: Yes  Safety Interventions:  · Home safety tips provided    ADL:  ADLs  In the past 7 days, did you need help from others to perform any of the following everyday activities? Eating, dressing, grooming, bathing, toileting, or walking/balance?: (!) Eating, Dressing, Grooming, Bathing, Toileting, Walking/Balance (parkinson's)  In the past 7 days, did you need help from others to take care of any of the following? Laundry, housekeeping, banking/finances, shopping, telephone use, food preparation, transportation, or taking medications?: [employer], Housekeeping, Banking/Finances, Shopping, Telephone Use, Food Preparation, Transportation, Taking Medications (parkinson's)  ADL Interventions: Her  and daughter care for her.    · Patient declines any further evaluation/treatment for this issue    Personalized Preventive Plan   Current Health Maintenance Status  Immunization History   Administered Date(s) Administered    Influenza, High Dose (Fluzone 65 yrs and older) 11/16/2018    Influenza, Triv, inactivated, subunit, adjuvanted, IM (Fluad 65 yrs and older) 09/27/2019    Pneumococcal Conjugate 13-valent ([id number]) 01/02/2015    Pneumococcal Polysaccharide ([id number]) 11/01/2017        Health Maintenance   Topic Date Due    DTaP/Tdap/Td vaccine (1 - Tdap) Never done    Shingles Vaccine (1 of 2) Never done    DEXA (modify frequency per FRAX score)  Never done    TSH testing  01/31/2021    Annual Wellness Visit (AWV)  05/21/2021    Flu vaccine (Season Ended) 09/01/2021    Pneumococcal 65+ years Vaccine  Completed    COVID-19 Vaccine  Completed    Hepatitis A vaccine  Aged Out    Hib vaccine  Aged Out    Meningococcal (ACWY) vaccine  Aged Out     Health Maintenance - Patient's care gaps include TSH, DEXA, Shingles vaccine, Tdap. Politely declines DEXA for now - they will think about this. Wants to wait until next appointment with PCP to get blood work done including TSH. Recommendations for Preventive Services Due: see orders and patient instructions/AVS.  . Recommended screening schedule for the next 5-10 years is provided to the patient in written form: see Patient Instructions/AVS.    There are no diagnoses linked to this encounter.

## 2021-05-27 NOTE — PATIENT INSTRUCTIONS
Personalized Preventive Plan for Denice Hernandez - 5/27/2021  Medicare offers a range of preventive health benefits. Some of the tests and screenings are paid in full while other may be subject to a deductible, co-insurance, and/or copay. Some of these benefits include a comprehensive review of your medical history including lifestyle, illnesses that may run in your family, and various assessments and screenings as appropriate. After reviewing your medical record and screening and assessments performed today your provider may have ordered immunizations, labs, imaging, and/or referrals for you. A list of these orders (if applicable) as well as your Preventive Care list are included within your After Visit Summary for your review. Other Preventive Recommendations:    · A preventive eye exam performed by an eye specialist is recommended every 1-2 years to screen for glaucoma; cataracts, macular degeneration, and other eye disorders. · A preventive dental visit is recommended every 6 months. · Try to get at least 150 minutes of exercise per week or 10,000 steps per day on a pedometer . · Order or download the FREE \"Exercise & Physical Activity: Your Everyday Guide\" from The [employer]. Call 2-612.734.5118 or search The [employer] online. · You need 5318-6117 mg of calcium and 7217-5674 IU of vitamin D per day. It is possible to meet your calcium requirement with diet alone, but a vitamin D supplement is usually necessary to meet this goal.  · When exposed to the sun, use a sunscreen that protects against both UVA and UVB radiation with an SPF of 30 or greater. Reapply every 2 to 3 hours or after sweating, drying off with a towel, or swimming. · Always wear a seat belt when traveling in a car. Always wear a helmet when riding a bicycle or motorcycle.

## 2021-06-02 ENCOUNTER — TELEPHONE (OUTPATIENT)
Dept: FAMILY MEDICINE CLINIC | Age: 81
End: 2021-06-02

## 2021-06-02 NOTE — TELEPHONE ENCOUNTER
dtr page  Called pt having increased mental confusion and agitation. Scheduled appt with dr Sandy Mckay 6/3/21 , informed dtr to bring urine sample.  dtr agreed & voiced understanding

## 2021-06-03 DIAGNOSIS — R41.82 ALTERED MENTAL STATUS, UNSPECIFIED ALTERED MENTAL STATUS TYPE: Primary | ICD-10-CM

## 2021-06-03 LAB
BILIRUBIN, POC: NEGATIVE
BLOOD URINE, POC: NEGATIVE
CLARITY, POC: CLEAR
COLOR, POC: ABNORMAL
GLUCOSE URINE, POC: NEGATIVE
KETONES, POC: NEGATIVE
LEUKOCYTE EST, POC: POSITIVE
NITRITE, POC: ABNORMAL
PH, POC: 6
PROTEIN, POC: NEGATIVE
SPECIFIC GRAVITY, POC: 1.03
UROBILINOGEN, POC: 0.2

## 2021-06-03 PROCEDURE — 81002 URINALYSIS NONAUTO W/O SCOPE: CPT | Performed by: FAMILY MEDICINE

## 2021-06-04 LAB — URINE CULTURE, ROUTINE: NORMAL

## 2021-06-04 RX ORDER — SULFAMETHOXAZOLE AND TRIMETHOPRIM 800; 160 MG/1; MG/1
1 TABLET ORAL 2 TIMES DAILY
Qty: 20 TABLET | Refills: 0 | Status: SHIPPED | OUTPATIENT
Start: 2021-06-04 | End: 2021-06-14

## 2021-06-17 ENCOUNTER — OFFICE VISIT (OUTPATIENT)
Dept: FAMILY MEDICINE CLINIC | Age: 81
End: 2021-06-17
Payer: MEDICARE

## 2021-06-17 ENCOUNTER — ANTI-COAG VISIT (OUTPATIENT)
Dept: FAMILY MEDICINE CLINIC | Age: 81
End: 2021-06-17

## 2021-06-17 VITALS — HEART RATE: 73 BPM | DIASTOLIC BLOOD PRESSURE: 74 MMHG | SYSTOLIC BLOOD PRESSURE: 122 MMHG | OXYGEN SATURATION: 98 %

## 2021-06-17 DIAGNOSIS — E55.9 VITAMIN D DEFICIENCY: ICD-10-CM

## 2021-06-17 DIAGNOSIS — I26.99 PULMONARY EMBOLUS, RIGHT (HCC): Primary | ICD-10-CM

## 2021-06-17 DIAGNOSIS — R53.83 FATIGUE, UNSPECIFIED TYPE: ICD-10-CM

## 2021-06-17 DIAGNOSIS — G81.94 LEFT HEMIPLEGIA (HCC): ICD-10-CM

## 2021-06-17 DIAGNOSIS — I26.99 PULMONARY EMBOLUS, RIGHT (HCC): ICD-10-CM

## 2021-06-17 DIAGNOSIS — E11.9 TYPE 2 DIABETES MELLITUS WITHOUT COMPLICATION, WITHOUT LONG-TERM CURRENT USE OF INSULIN (HCC): ICD-10-CM

## 2021-06-17 DIAGNOSIS — G20 PARKINSON DISEASE (HCC): ICD-10-CM

## 2021-06-17 DIAGNOSIS — E11.9 TYPE 2 DIABETES MELLITUS WITHOUT COMPLICATION, WITHOUT LONG-TERM CURRENT USE OF INSULIN (HCC): Primary | ICD-10-CM

## 2021-06-17 LAB
A/G RATIO: 1.6 (ref 1.1–2.2)
ALBUMIN SERPL-MCNC: 4.4 G/DL (ref 3.4–5)
ALP BLD-CCNC: 67 U/L (ref 40–129)
ALT SERPL-CCNC: <5 U/L (ref 10–40)
ANION GAP SERPL CALCULATED.3IONS-SCNC: 15 MMOL/L (ref 3–16)
AST SERPL-CCNC: 14 U/L (ref 15–37)
BASOPHILS ABSOLUTE: 0.1 K/UL (ref 0–0.2)
BASOPHILS RELATIVE PERCENT: 1 %
BILIRUB SERPL-MCNC: 0.4 MG/DL (ref 0–1)
BUN BLDV-MCNC: 25 MG/DL (ref 7–20)
CALCIUM SERPL-MCNC: 9.6 MG/DL (ref 8.3–10.6)
CHLORIDE BLD-SCNC: 103 MMOL/L (ref 99–110)
CO2: 22 MMOL/L (ref 21–32)
CREAT SERPL-MCNC: 0.8 MG/DL (ref 0.6–1.2)
EOSINOPHILS ABSOLUTE: 0.1 K/UL (ref 0–0.6)
EOSINOPHILS RELATIVE PERCENT: 1.5 %
GFR AFRICAN AMERICAN: >60
GFR NON-AFRICAN AMERICAN: >60
GLOBULIN: 2.7 G/DL
GLUCOSE BLD-MCNC: 99 MG/DL (ref 70–99)
HCT VFR BLD CALC: 40.9 % (ref 36–48)
HEMOGLOBIN: 14 G/DL (ref 12–16)
INTERNATIONAL NORMALIZATION RATIO, POC: 1.8
LYMPHOCYTES ABSOLUTE: 1.7 K/UL (ref 1–5.1)
LYMPHOCYTES RELATIVE PERCENT: 29.1 %
MCH RBC QN AUTO: 33.9 PG (ref 26–34)
MCHC RBC AUTO-ENTMCNC: 34.2 G/DL (ref 31–36)
MCV RBC AUTO: 99.1 FL (ref 80–100)
MONOCYTES ABSOLUTE: 0.6 K/UL (ref 0–1.3)
MONOCYTES RELATIVE PERCENT: 10.3 %
NEUTROPHILS ABSOLUTE: 3.3 K/UL (ref 1.7–7.7)
NEUTROPHILS RELATIVE PERCENT: 58.1 %
PDW BLD-RTO: 13.5 % (ref 12.4–15.4)
PLATELET # BLD: 268 K/UL (ref 135–450)
PMV BLD AUTO: 7.8 FL (ref 5–10.5)
POTASSIUM SERPL-SCNC: 4.4 MMOL/L (ref 3.5–5.1)
PROTHROMBIN TIME, POC: NORMAL
RBC # BLD: 4.13 M/UL (ref 4–5.2)
SODIUM BLD-SCNC: 140 MMOL/L (ref 136–145)
TOTAL PROTEIN: 7.1 G/DL (ref 6.4–8.2)
TSH REFLEX FT4: 1.4 UIU/ML (ref 0.27–4.2)
VITAMIN B-12: >2000 PG/ML (ref 211–911)
VITAMIN D 25-HYDROXY: 49.2 NG/ML
WBC # BLD: 5.7 K/UL (ref 4–11)

## 2021-06-17 PROCEDURE — 1123F ACP DISCUSS/DSCN MKR DOCD: CPT | Performed by: FAMILY MEDICINE

## 2021-06-17 PROCEDURE — 99214 OFFICE O/P EST MOD 30 MIN: CPT | Performed by: FAMILY MEDICINE

## 2021-06-17 PROCEDURE — G8400 PT W/DXA NO RESULTS DOC: HCPCS | Performed by: FAMILY MEDICINE

## 2021-06-17 PROCEDURE — 1036F TOBACCO NON-USER: CPT | Performed by: FAMILY MEDICINE

## 2021-06-17 PROCEDURE — 85610 PROTHROMBIN TIME: CPT | Performed by: FAMILY MEDICINE

## 2021-06-17 PROCEDURE — 1090F PRES/ABSN URINE INCON ASSESS: CPT | Performed by: FAMILY MEDICINE

## 2021-06-17 PROCEDURE — G8427 DOCREV CUR MEDS BY ELIG CLIN: HCPCS | Performed by: FAMILY MEDICINE

## 2021-06-17 PROCEDURE — 4040F PNEUMOC VAC/ADMIN/RCVD: CPT | Performed by: FAMILY MEDICINE

## 2021-06-17 PROCEDURE — G8417 CALC BMI ABV UP PARAM F/U: HCPCS | Performed by: FAMILY MEDICINE

## 2021-06-17 RX ORDER — CARBIDOPA, LEVODOPA AND ENTACAPONE 25; 200; 100 MG/1; MG/1; MG/1
TABLET, FILM COATED ORAL
Qty: 270 TABLET | Refills: 1 | Status: SHIPPED
Start: 2021-06-17 | End: 2021-09-29 | Stop reason: CLARIF

## 2021-06-17 NOTE — PROGRESS NOTES
6/17/2021    [facility]    Chief Complaint   Patient presents with    6 Month Follow-Up    Other     inr 6/17/21 1.8. pt resumed warfarin 2 mg alternating 1 mg 3 days ago post finishing rx bactrim.  Other     no c/o's       HPI    Flaquita Merchant is a 78 y.o. female who presents today with follow-up. Daughter continues to relate patient's atypical response to medications. She is an excellent observer for as she is a physical therapist.  Patient often gets sedated and decreased mobility soon after medications although bears weight better and seems to stand straighter. She unfortunately shuffles much worse with medication. Neurology follows closely and is move her amantadine to I believe in the morning instead of the night which has benefited patient's sedation. Patient sugars have been excellent with a morning average of 120. No hypoglycemia. We treated patient for an interval UTI very successfully preventing a serious illness. She had decreased her Coumadin while on the antibiotics and therefore is a little bit lower at the moment. REVIEW OF SYSTEMS    Constitutional:  Denies fever, chills, weight loss or weakness  Eyes:  no photophobia or discharge  ENT:  no sore throat or ear pain  Cardiovascular:  Denies chest pain, palpitations or swelling  Respiratory:  Denies cough or shortness of breath  GI:  no abdominal pain, nausea, vomiting, or diarrhea  Musculoskeletal:  no back pain  Skin:  No rashes  Neurologic:  no headache, focal weakness, or sensory changes  Endocrine:  no polyuria or polydipsia      PAST MEDICAL HISTORY  Past Medical History:   Diagnosis Date    Atrial fibrillation ([facility] [address] 01/27/2016    per holter monitor    Back pain     severe degeneration    Gallstone     H/O cardiovascular stress test 8/12/2014    cardiolite-normal, no ischemia, EF70%    H/O Doppler ultrasound 10/1/14    Carotid doppler. No hemodynamically significant stenosis bilaterally.     H/O echocardiogram 08/12/2014    EF >57%, mild mitral and tricuspid regurg, normal LV systolic but abnormal diastolic function, no pericardial effusion.  History of echocardiogram 12/04/2020    EF 50%, Mild septal asymmetric hypertrophy, Grade II DD, Mildly dilated left atrium, Normal pulmonary artery pressure, no pericardial effusion    History of Holter monitoring 1/27/2016    new onset afib    Hx of Carotid Doppler ultrasound 12/04/2020    Mild 0-49% disease of the bilateral ICA, Normal vertebral flow    Leg cramping     Recurrent urinary tract infection     Spinal stenosis of lumbar region     Vitamin B12 deficiency     Vitamin D deficiency        FAMILY HISTORY  Family History   Problem Relation Age of Onset    High Blood Pressure Sister     Heart Disease Brother        SOCIAL HISTORY  Social History     Socioeconomic History    Marital status:      Spouse name: Not on file    Number of children: Not on file    Years of education: Not on file    Highest education level: Not on file   Occupational History    Not on file   Tobacco Use    Smoking status: Never Smoker    Smokeless tobacco: Never Used   Vaping Use    Vaping Use: Never used   Substance and Sexual Activity    Alcohol use: No     Comment: caffeine 1 pop a day    Drug use: No    Sexual activity: Not on file   Other Topics Concern    Not on file   Social History Narrative    Not on file     Social Determinants of Health     Financial Resource Strain: Low Risk     Difficulty of Paying Living Expenses: Not hard at all   Food Insecurity: No Food Insecurity    Worried About Running Out of Food in the Last Year: Never true    Melanie of Food in the Last Year: Never true   Transportation Needs:     Lack of Transportation (Medical):      Lack of Transportation (Non-Medical):    Physical Activity:     Days of Exercise per Week:     Minutes of Exercise per Session:    Stress:     Feeling of Stress :    Social Connections:     Frequency of Communication with Friends and Family:     Frequency of Social Gatherings with Friends and Family:     Attends [employer] Services:     Active Member of Clubs or Organizations:     Attends Club or Organization Meetings:     Marital Status:    Intimate Partner Violence:     Fear of Current or Ex-Partner:     Emotionally Abused:     Physically Abused:     Sexually Abused:         SURGICAL HISTORY  No past surgical history on file. CURRENT MEDICATIONS  Current Outpatient Medications   Medication Sig Dispense Refill    carbidopa-levodopa-entacapone (STALEVO 100) -200 MG TABS Pt takes 1 tablet  3 times daily 270 tablet 1    metFORMIN (GLUCOPHAGE) 500 MG tablet 1 with evening meds. 90 tablet 1    levothyroxine (SYNTHROID) 50 MCG tablet Take 1 tablet by mouth Daily 90 tablet 1    warfarin (COUMADIN) 2 MG tablet TAKE 1 TABLET BY MOUTH ONCE DAILY 90 tablet 1    Accu-Chek Softclix Lancets MISC USE 1 LANCET ONCE A  each 2    amantadine (SYMMETREL) 100 MG capsule Take 1 capsule by mouth daily 90 capsule 1    blood glucose test strips (ACCU-CHEK HIEN PLUS) strip TEST BLOOD SUGAR DAILY AS NEEDED 995 strip 3    folic acid (FOLVITE) 1 MG tablet Take 1 tablet by mouth daily 90 tablet 1    warfarin (COUMADIN) 1 MG tablet Alternating 1 whole pill and then 1/2 pill every other day 30 tablet 3    baclofen (LIORESAL) 10 MG tablet TAKE ONE-HALF (1/2) TABLET BY MOUTH SIX (6) TIMES A DAY AND ONE (1) TABLET BY MOUTH IN EVENING 300 tablet 0    acetaminophen (TYLENOL) 325 MG tablet Take 650 mg by mouth 3 times daily       vitamin B-12 (CYANOCOBALAMIN) 1000 MCG tablet Take 1,000 mcg by mouth daily      polyethylene glycol (GLYCOLAX) powder Take 17 g by mouth daily   5    Cholecalciferol (VITAMIN D) 2000 UNITS CAPS capsule Take 1 capsule by mouth. No current facility-administered medications for this visit.        ALLERGIES  Allergies   Allergen Reactions    Ciprofibrate     Ciprofloxacin Other (See Comments)     Anorexia      Pregabalin        PHYSICAL EXAM  /74   Pulse 73   SpO2 98%     ASSESSMENT & PLAN    1. Type 2 diabetes mellitus without complication, without long-term current use of insulin (HCC)  Issue is stable check labs today. Adjust medication off of lab results. - Lipid Panel; Future  - Microalbumin / Creatinine Urine Ratio; Future  - Hemoglobin A1C; Future    2. Parkinson disease ([address]  Insert controlled. Continue following up with neurology. - carbidopa-levodopa-entacapone (STALEVO 100) -200 MG TABS; Pt takes 1 tablet  3 times daily  Dispense: 270 tablet; Refill: 1    3. Left hemiplegia ([address]  Issue is stable check labs today. Adjust medication off of lab results. - Vitamin B12; Future    4. Pulmonary embolus, right (HCC)  Keep the current dose of Coumadin now the patient's off antibiotics. Recheck in 3 weeks. - POCT INR    5. Fatigue, unspecified type  Issue is stable check labs today. Adjust medication off of lab results. - CBC Auto Differential; Future  - Comprehensive Metabolic Panel; Future  - TSH WITH REFLEX TO FT4; Future    6. Vitamin D deficiency  Issue is stable check labs today. Adjust medication off of lab results. - Vitamin D 25 Hydroxy;  Future    Follow-up 4 months       Electronically signed by Ml Isaac MD on 6/17/2021

## 2021-06-18 ENCOUNTER — TELEPHONE (OUTPATIENT)
Dept: FAMILY MEDICINE CLINIC | Age: 81
End: 2021-06-18

## 2021-06-18 LAB
ESTIMATED AVERAGE GLUCOSE: 105.4 MG/DL
HBA1C MFR BLD: 5.3 %

## 2021-06-18 NOTE — TELEPHONE ENCOUNTER
Antonio Lacey you are crushing this diabetes thing. Your hemoglobin A1c was 5.3. Your best yet. Your average sugar is just over 105 which is excellent. Your thyroid numbers as well as your vitamin B and vitamin D numbers are great also. No changes needed there either. Please keep your medications the same. Hopefully we can get you moving a little better.   Good luck

## 2021-06-18 NOTE — TELEPHONE ENCOUNTER
Spoke with pt's dtr Urmila  per dr Pauline Villarreal note.  Naima Fisher agreed & voiced understanding

## 2021-07-08 ENCOUNTER — NURSE ONLY (OUTPATIENT)
Dept: FAMILY MEDICINE CLINIC | Age: 81
End: 2021-07-08
Payer: MEDICARE

## 2021-07-08 ENCOUNTER — ANTI-COAG VISIT (OUTPATIENT)
Dept: FAMILY MEDICINE CLINIC | Age: 81
End: 2021-07-08

## 2021-07-08 DIAGNOSIS — I26.99 PULMONARY EMBOLUS, RIGHT (HCC): ICD-10-CM

## 2021-07-08 DIAGNOSIS — I26.99 PULMONARY EMBOLUS, RIGHT (HCC): Primary | ICD-10-CM

## 2021-07-08 LAB
INTERNATIONAL NORMALIZATION RATIO, POC: 2.2
PROTHROMBIN TIME, POC: NORMAL

## 2021-07-08 PROCEDURE — 85610 PROTHROMBIN TIME: CPT | Performed by: FAMILY MEDICINE

## 2021-07-08 PROCEDURE — 99999 PR OFFICE/OUTPT VISIT,PROCEDURE ONLY: CPT | Performed by: FAMILY MEDICINE

## 2021-07-17 DIAGNOSIS — G20 PARKINSON DISEASE (HCC): ICD-10-CM

## 2021-07-20 RX ORDER — FOLIC ACID 1 MG/1
TABLET ORAL
Qty: 90 TABLET | Refills: 1 | Status: SHIPPED | OUTPATIENT
Start: 2021-07-20

## 2021-08-04 ENCOUNTER — TELEMEDICINE (OUTPATIENT)
Dept: FAMILY MEDICINE CLINIC | Age: 81
End: 2021-08-04
Payer: MEDICARE

## 2021-08-04 DIAGNOSIS — I48.20 CHRONIC ATRIAL FIBRILLATION (HCC): Primary | ICD-10-CM

## 2021-08-04 DIAGNOSIS — R53.1 WEAKNESS: ICD-10-CM

## 2021-08-04 DIAGNOSIS — E11.9 TYPE 2 DIABETES MELLITUS WITHOUT COMPLICATION, WITHOUT LONG-TERM CURRENT USE OF INSULIN (HCC): ICD-10-CM

## 2021-08-04 DIAGNOSIS — I26.99 PULMONARY EMBOLUS, RIGHT (HCC): ICD-10-CM

## 2021-08-04 PROCEDURE — 99213 OFFICE O/P EST LOW 20 MIN: CPT | Performed by: FAMILY MEDICINE

## 2021-08-04 PROCEDURE — G8417 CALC BMI ABV UP PARAM F/U: HCPCS | Performed by: FAMILY MEDICINE

## 2021-08-04 PROCEDURE — 1036F TOBACCO NON-USER: CPT | Performed by: FAMILY MEDICINE

## 2021-08-04 PROCEDURE — G8427 DOCREV CUR MEDS BY ELIG CLIN: HCPCS | Performed by: FAMILY MEDICINE

## 2021-08-04 PROCEDURE — G8400 PT W/DXA NO RESULTS DOC: HCPCS | Performed by: FAMILY MEDICINE

## 2021-08-04 PROCEDURE — 1090F PRES/ABSN URINE INCON ASSESS: CPT | Performed by: FAMILY MEDICINE

## 2021-08-04 PROCEDURE — 1123F ACP DISCUSS/DSCN MKR DOCD: CPT | Performed by: FAMILY MEDICINE

## 2021-08-04 PROCEDURE — 4040F PNEUMOC VAC/ADMIN/RCVD: CPT | Performed by: FAMILY MEDICINE

## 2021-08-04 NOTE — PROGRESS NOTES
PLUS) strip TEST BLOOD SUGAR DAILY AS NEEDED Yes Miko Schwarz MD   warfarin (COUMADIN) 1 MG tablet Alternating 1 whole pill and then 1/2 pill every other day Yes Miko Schwarz MD   baclofen (LIORESAL) 10 MG tablet TAKE ONE-HALF (1/2) TABLET BY MOUTH SIX (6) TIMES A DAY AND ONE (1) TABLET BY MOUTH IN EVENING Yes Miko Schwarz MD   acetaminophen (TYLENOL) 325 MG tablet Take 650 mg by mouth 3 times daily  Yes Historical Provider, MD   vitamin B-12 (CYANOCOBALAMIN) 1000 MCG tablet Take 1,000 mcg by mouth daily Yes Historical Provider, MD   polyethylene glycol (GLYCOLAX) powder Take 17 g by mouth daily  Yes Historical Provider, MD   Cholecalciferol (VITAMIN D) 2000 UNITS CAPS capsule Take 1 capsule by mouth. Yes Historical Provider, MD       Social History     Tobacco Use    Smoking status: Never Smoker    Smokeless tobacco: Never Used   Vaping Use    Vaping Use: Never used   Substance Use Topics    Alcohol use: No     Comment: caffeine 1 pop a day    Drug use: No        PHYSICAL EXAM  There were no vitals taken for this visit. ASSESSMENT/PLAN:  1. Chronic atrial fibrillation (HCC)  Check INR tomorrow  - Protime-INR; Future    2. Pulmonary embolus, right (HCC)  On Coumadin see above    3. Type 2 diabetes mellitus without complication, without long-term current use of insulin (HCC)  Recently good control. Not able to do a new A1c at this time. No signs of hypoglycemia    4. Weakness  Issue is stable check labs today. Adjust medication off of lab results. - Comprehensive Metabolic Panel; Future  - CBC Auto Differential; Future      No follow-ups on file. Rom Dupont is a 78 y.o. female being evaluated by a Virtual Visit (video visit) encounter to address concerns as mentioned above. A caregiver was present when appropriate.  Due to this being a TeleHealth encounter (During [id number]-27 public health emergency), evaluation of the following organ systems was limited:

## 2021-08-06 ENCOUNTER — ANTI-COAG VISIT (OUTPATIENT)
Dept: FAMILY MEDICINE CLINIC | Age: 81
End: 2021-08-06

## 2021-08-06 DIAGNOSIS — I26.99 PULMONARY EMBOLUS, RIGHT (HCC): Primary | ICD-10-CM

## 2021-09-02 ENCOUNTER — TELEPHONE (OUTPATIENT)
Dept: FAMILY MEDICINE CLINIC | Age: 81
End: 2021-09-02

## 2021-09-02 NOTE — TELEPHONE ENCOUNTER
----- Message from Iris Bishop sent at 8/31/2021 11:32 AM EDT -----  Subject: Message to Provider    QUESTIONS  Information for Provider? Pt in need of a Nurse Visit - 4 weeks would be   this Thursday 09/02, middle of the day time (ie 1:30pm). Yessenia Harden, her   daughter is calling.  ---------------------------------------------------------------------------  --------------  CALL BACK INFO  What is the best way for the office to contact you? OK to leave message on   voicemail  Preferred Call Back Phone Number? [phone]  ---------------------------------------------------------------------------  --------------  SCRIPT ANSWERS  Relationship to Patient? Other  Representative Name? Yessenia Harden  Is the Representative on the appropriate HIPAA document in Epic?  Yes

## 2021-09-03 ENCOUNTER — ANTI-COAG VISIT (OUTPATIENT)
Dept: FAMILY MEDICINE CLINIC | Age: 81
End: 2021-09-03

## 2021-09-03 ENCOUNTER — NURSE ONLY (OUTPATIENT)
Dept: FAMILY MEDICINE CLINIC | Age: 81
End: 2021-09-03
Payer: MEDICARE

## 2021-09-03 DIAGNOSIS — I26.99 PULMONARY EMBOLUS, RIGHT (HCC): ICD-10-CM

## 2021-09-03 DIAGNOSIS — I26.99 PULMONARY EMBOLUS, RIGHT (HCC): Primary | ICD-10-CM

## 2021-09-03 LAB
INTERNATIONAL NORMALIZATION RATIO, POC: 2.4
PROTHROMBIN TIME, POC: NORMAL

## 2021-09-03 PROCEDURE — 85610 PROTHROMBIN TIME: CPT | Performed by: FAMILY MEDICINE

## 2021-09-29 ENCOUNTER — APPOINTMENT (OUTPATIENT)
Dept: GENERAL RADIOLOGY | Age: 81
DRG: 689 | End: 2021-09-29
Payer: MEDICARE

## 2021-09-29 ENCOUNTER — HOSPITAL ENCOUNTER (INPATIENT)
Age: 81
LOS: 14 days | Discharge: HOME HEALTH | DRG: 689 | End: 2021-10-13
Attending: INTERNAL MEDICINE | Admitting: INTERNAL MEDICINE
Payer: MEDICARE

## 2021-09-29 ENCOUNTER — TELEPHONE (OUTPATIENT)
Dept: CARDIOLOGY CLINIC | Age: 81
End: 2021-09-29

## 2021-09-29 ENCOUNTER — APPOINTMENT (OUTPATIENT)
Dept: CT IMAGING | Age: 81
DRG: 689 | End: 2021-09-29
Payer: MEDICARE

## 2021-09-29 DIAGNOSIS — R79.1 SUBTHERAPEUTIC INTERNATIONAL NORMALIZED RATIO (INR): ICD-10-CM

## 2021-09-29 DIAGNOSIS — I48.91 ATRIAL FIBRILLATION WITH RVR (HCC): ICD-10-CM

## 2021-09-29 DIAGNOSIS — N39.0 URINARY TRACT INFECTION WITHOUT HEMATURIA, SITE UNSPECIFIED: Primary | ICD-10-CM

## 2021-09-29 DIAGNOSIS — R53.1 GENERALIZED WEAKNESS: ICD-10-CM

## 2021-09-29 LAB
ALBUMIN SERPL-MCNC: 4.1 GM/DL (ref 3.4–5)
ALP BLD-CCNC: 71 IU/L (ref 40–129)
ALT SERPL-CCNC: <5 U/L (ref 10–40)
ANION GAP SERPL CALCULATED.3IONS-SCNC: 12 MMOL/L (ref 4–16)
APTT: 36.5 SECONDS (ref 25.1–37.1)
AST SERPL-CCNC: 11 IU/L (ref 15–37)
BACTERIA: ABNORMAL /HPF
BASOPHILS ABSOLUTE: 0.1 K/CU MM
BASOPHILS RELATIVE PERCENT: 0.8 % (ref 0–1)
BILIRUB SERPL-MCNC: 0.7 MG/DL (ref 0–1)
BILIRUBIN URINE: NEGATIVE MG/DL
BLOOD, URINE: ABNORMAL
BUN BLDV-MCNC: 30 MG/DL (ref 6–23)
CALCIUM SERPL-MCNC: 9.2 MG/DL (ref 8.3–10.6)
CHLORIDE BLD-SCNC: 102 MMOL/L (ref 99–110)
CLARITY: ABNORMAL
CO2: 21 MMOL/L (ref 21–32)
COLOR: YELLOW
CREAT SERPL-MCNC: 0.8 MG/DL (ref 0.6–1.1)
DIFFERENTIAL TYPE: ABNORMAL
EKG DIAGNOSIS: NORMAL
EKG Q-T INTERVAL: 310 MS
EKG QRS DURATION: 86 MS
EKG QTC CALCULATION (BAZETT): 419 MS
EKG R AXIS: -23 DEGREES
EKG T AXIS: 83 DEGREES
EKG VENTRICULAR RATE: 110 BPM
EOSINOPHILS ABSOLUTE: 0.1 K/CU MM
EOSINOPHILS RELATIVE PERCENT: 0.9 % (ref 0–3)
GFR AFRICAN AMERICAN: >60 ML/MIN/1.73M2
GFR NON-AFRICAN AMERICAN: >60 ML/MIN/1.73M2
GLUCOSE BLD-MCNC: 126 MG/DL (ref 70–99)
GLUCOSE BLD-MCNC: 146 MG/DL (ref 70–99)
GLUCOSE, URINE: NEGATIVE MG/DL
HCT VFR BLD CALC: 44.6 % (ref 37–47)
HEMOGLOBIN: 14 GM/DL (ref 12.5–16)
IMMATURE NEUTROPHIL %: 0.7 % (ref 0–0.43)
INR BLD: 1.98 INDEX
KETONES, URINE: ABNORMAL MG/DL
LEUKOCYTE ESTERASE, URINE: ABNORMAL
LV EF: 53 %
LVEF MODALITY: NORMAL
LYMPHOCYTES ABSOLUTE: 1.6 K/CU MM
LYMPHOCYTES RELATIVE PERCENT: 17.9 % (ref 24–44)
MAGNESIUM: 1.8 MG/DL (ref 1.8–2.4)
MCH RBC QN AUTO: 33.2 PG (ref 27–31)
MCHC RBC AUTO-ENTMCNC: 31.4 % (ref 32–36)
MCV RBC AUTO: 105.7 FL (ref 78–100)
MONOCYTES ABSOLUTE: 0.8 K/CU MM
MONOCYTES RELATIVE PERCENT: 9.1 % (ref 0–4)
MUCUS: ABNORMAL HPF
NITRITE URINE, QUANTITATIVE: POSITIVE
NUCLEATED RBC %: 0 %
PDW BLD-RTO: 12.5 % (ref 11.7–14.9)
PH, URINE: 5 (ref 5–8)
PLATELET # BLD: 264 K/CU MM (ref 140–440)
PMV BLD AUTO: 10.1 FL (ref 7.5–11.1)
POTASSIUM SERPL-SCNC: 4.1 MMOL/L (ref 3.5–5.1)
PRO-BNP: 3846 PG/ML
PROTEIN UA: 30 MG/DL
PROTHROMBIN TIME: 25.7 SECONDS (ref 11.7–14.5)
RBC # BLD: 4.22 M/CU MM (ref 4.2–5.4)
RBC URINE: 1607 /HPF (ref 0–6)
SEGMENTED NEUTROPHILS ABSOLUTE COUNT: 6.4 K/CU MM
SEGMENTED NEUTROPHILS RELATIVE PERCENT: 70.6 % (ref 36–66)
SODIUM BLD-SCNC: 135 MMOL/L (ref 135–145)
SPECIFIC GRAVITY UA: 1.02 (ref 1–1.03)
SQUAMOUS EPITHELIAL: 3 /HPF
TOTAL CK: 42 IU/L (ref 26–140)
TOTAL IMMATURE NEUTOROPHIL: 0.06 K/CU MM
TOTAL NUCLEATED RBC: 0 K/CU MM
TOTAL PROTEIN: 6.6 GM/DL (ref 6.4–8.2)
TRICHOMONAS: ABNORMAL /HPF
TROPONIN T: 0.01 NG/ML
TSH HIGH SENSITIVITY: 2.48 UIU/ML (ref 0.27–4.2)
UROBILINOGEN, URINE: NEGATIVE MG/DL (ref 0.2–1)
WBC # BLD: 9 K/CU MM (ref 4–10.5)
WBC UA: 750 /HPF (ref 0–5)

## 2021-09-29 PROCEDURE — 36415 COLL VENOUS BLD VENIPUNCTURE: CPT

## 2021-09-29 PROCEDURE — 85025 COMPLETE CBC W/AUTO DIFF WBC: CPT

## 2021-09-29 PROCEDURE — 82962 GLUCOSE BLOOD TEST: CPT

## 2021-09-29 PROCEDURE — 2580000003 HC RX 258: Performed by: PHYSICIAN ASSISTANT

## 2021-09-29 PROCEDURE — 6370000000 HC RX 637 (ALT 250 FOR IP): Performed by: INTERNAL MEDICINE

## 2021-09-29 PROCEDURE — 87186 SC STD MICRODIL/AGAR DIL: CPT

## 2021-09-29 PROCEDURE — 82550 ASSAY OF CK (CPK): CPT

## 2021-09-29 PROCEDURE — 85610 PROTHROMBIN TIME: CPT

## 2021-09-29 PROCEDURE — 87086 URINE CULTURE/COLONY COUNT: CPT

## 2021-09-29 PROCEDURE — 1200000000 HC SEMI PRIVATE

## 2021-09-29 PROCEDURE — 83880 ASSAY OF NATRIURETIC PEPTIDE: CPT

## 2021-09-29 PROCEDURE — 87077 CULTURE AEROBIC IDENTIFY: CPT

## 2021-09-29 PROCEDURE — 84443 ASSAY THYROID STIM HORMONE: CPT

## 2021-09-29 PROCEDURE — 85730 THROMBOPLASTIN TIME PARTIAL: CPT

## 2021-09-29 PROCEDURE — 81001 URINALYSIS AUTO W/SCOPE: CPT

## 2021-09-29 PROCEDURE — 6360000002 HC RX W HCPCS: Performed by: PHYSICIAN ASSISTANT

## 2021-09-29 PROCEDURE — 83735 ASSAY OF MAGNESIUM: CPT

## 2021-09-29 PROCEDURE — 93005 ELECTROCARDIOGRAM TRACING: CPT | Performed by: PHYSICIAN ASSISTANT

## 2021-09-29 PROCEDURE — 70450 CT HEAD/BRAIN W/O DYE: CPT

## 2021-09-29 PROCEDURE — 93010 ELECTROCARDIOGRAM REPORT: CPT | Performed by: INTERNAL MEDICINE

## 2021-09-29 PROCEDURE — 80053 COMPREHEN METABOLIC PANEL: CPT

## 2021-09-29 PROCEDURE — 84484 ASSAY OF TROPONIN QUANT: CPT

## 2021-09-29 PROCEDURE — 93306 TTE W/DOPPLER COMPLETE: CPT

## 2021-09-29 PROCEDURE — 71045 X-RAY EXAM CHEST 1 VIEW: CPT

## 2021-09-29 PROCEDURE — 99284 EMERGENCY DEPT VISIT MOD MDM: CPT

## 2021-09-29 PROCEDURE — 6370000000 HC RX 637 (ALT 250 FOR IP): Performed by: PHYSICIAN ASSISTANT

## 2021-09-29 PROCEDURE — 6370000000 HC RX 637 (ALT 250 FOR IP): Performed by: NURSE PRACTITIONER

## 2021-09-29 RX ORDER — LANOLIN ALCOHOL/MO/W.PET/CERES
1000 CREAM (GRAM) TOPICAL EVERY OTHER DAY
Status: DISCONTINUED | OUTPATIENT
Start: 2021-10-01 | End: 2021-10-13 | Stop reason: HOSPADM

## 2021-09-29 RX ORDER — ACETAMINOPHEN 325 MG/1
650 TABLET ORAL EVERY 6 HOURS PRN
Status: DISCONTINUED | OUTPATIENT
Start: 2021-09-29 | End: 2021-10-13 | Stop reason: HOSPADM

## 2021-09-29 RX ORDER — BACLOFEN 10 MG/1
5 TABLET ORAL EVERY 4 HOURS PRN
Status: DISCONTINUED | OUTPATIENT
Start: 2021-09-29 | End: 2021-10-13 | Stop reason: HOSPADM

## 2021-09-29 RX ORDER — DILTIAZEM HYDROCHLORIDE 60 MG/1
60 TABLET, FILM COATED ORAL EVERY 8 HOURS SCHEDULED
Status: DISCONTINUED | OUTPATIENT
Start: 2021-09-29 | End: 2021-10-09

## 2021-09-29 RX ORDER — SODIUM CHLORIDE 0.9 % (FLUSH) 0.9 %
10 SYRINGE (ML) INJECTION PRN
Status: DISCONTINUED | OUTPATIENT
Start: 2021-09-29 | End: 2021-10-13 | Stop reason: HOSPADM

## 2021-09-29 RX ORDER — POTASSIUM CHLORIDE 7.45 MG/ML
10 INJECTION INTRAVENOUS PRN
Status: DISCONTINUED | OUTPATIENT
Start: 2021-09-29 | End: 2021-10-13 | Stop reason: HOSPADM

## 2021-09-29 RX ORDER — LEVOTHYROXINE SODIUM 0.05 MG/1
50 TABLET ORAL DAILY
Status: DISCONTINUED | OUTPATIENT
Start: 2021-09-30 | End: 2021-10-13 | Stop reason: HOSPADM

## 2021-09-29 RX ORDER — DEXTROSE MONOHYDRATE 25 G/50ML
12.5 INJECTION, SOLUTION INTRAVENOUS PRN
Status: DISCONTINUED | OUTPATIENT
Start: 2021-09-29 | End: 2021-10-13 | Stop reason: HOSPADM

## 2021-09-29 RX ORDER — NICOTINE POLACRILEX 4 MG
15 LOZENGE BUCCAL PRN
Status: DISCONTINUED | OUTPATIENT
Start: 2021-09-29 | End: 2021-10-13 | Stop reason: HOSPADM

## 2021-09-29 RX ORDER — POLYETHYLENE GLYCOL 3350 17 G/17G
17 POWDER, FOR SOLUTION ORAL DAILY PRN
Status: DISCONTINUED | OUTPATIENT
Start: 2021-09-29 | End: 2021-10-13 | Stop reason: HOSPADM

## 2021-09-29 RX ORDER — TRAMADOL HYDROCHLORIDE 50 MG/1
50 TABLET ORAL EVERY 6 HOURS PRN
Status: DISCONTINUED | OUTPATIENT
Start: 2021-09-29 | End: 2021-10-13 | Stop reason: HOSPADM

## 2021-09-29 RX ORDER — MAGNESIUM SULFATE 1 G/100ML
1000 INJECTION INTRAVENOUS PRN
Status: DISCONTINUED | OUTPATIENT
Start: 2021-09-29 | End: 2021-10-13 | Stop reason: HOSPADM

## 2021-09-29 RX ORDER — GUAIFENESIN 100 MG/5ML
100 SOLUTION ORAL EVERY 4 HOURS PRN
Status: DISCONTINUED | OUTPATIENT
Start: 2021-09-29 | End: 2021-09-29 | Stop reason: SDUPTHER

## 2021-09-29 RX ORDER — AMANTADINE HYDROCHLORIDE 100 MG/1
100 CAPSULE, GELATIN COATED ORAL NIGHTLY
Status: DISCONTINUED | OUTPATIENT
Start: 2021-09-29 | End: 2021-10-13 | Stop reason: HOSPADM

## 2021-09-29 RX ORDER — SODIUM CHLORIDE 9 MG/ML
INJECTION, SOLUTION INTRAVENOUS CONTINUOUS
Status: DISCONTINUED | OUTPATIENT
Start: 2021-09-29 | End: 2021-10-10

## 2021-09-29 RX ORDER — WARFARIN SODIUM 1 MG/1
1 TABLET ORAL EVERY OTHER DAY
Status: DISCONTINUED | OUTPATIENT
Start: 2021-09-30 | End: 2021-10-03

## 2021-09-29 RX ORDER — SODIUM CHLORIDE 9 MG/ML
25 INJECTION, SOLUTION INTRAVENOUS PRN
Status: DISCONTINUED | OUTPATIENT
Start: 2021-09-29 | End: 2021-10-12

## 2021-09-29 RX ORDER — SODIUM CHLORIDE 0.9 % (FLUSH) 0.9 %
10 SYRINGE (ML) INJECTION EVERY 12 HOURS SCHEDULED
Status: DISCONTINUED | OUTPATIENT
Start: 2021-09-29 | End: 2021-10-13 | Stop reason: HOSPADM

## 2021-09-29 RX ORDER — SODIUM CHLORIDE 9 MG/ML
INJECTION, SOLUTION INTRAVENOUS CONTINUOUS
Status: DISCONTINUED | OUTPATIENT
Start: 2021-09-29 | End: 2021-09-29 | Stop reason: SDUPTHER

## 2021-09-29 RX ORDER — FAMOTIDINE 20 MG/1
20 TABLET, FILM COATED ORAL 2 TIMES DAILY
Status: DISCONTINUED | OUTPATIENT
Start: 2021-09-29 | End: 2021-10-13 | Stop reason: HOSPADM

## 2021-09-29 RX ORDER — ONDANSETRON 4 MG/1
4 TABLET, ORALLY DISINTEGRATING ORAL EVERY 8 HOURS PRN
Status: DISCONTINUED | OUTPATIENT
Start: 2021-09-29 | End: 2021-10-13 | Stop reason: HOSPADM

## 2021-09-29 RX ORDER — ONDANSETRON 2 MG/ML
4 INJECTION INTRAMUSCULAR; INTRAVENOUS EVERY 6 HOURS PRN
Status: DISCONTINUED | OUTPATIENT
Start: 2021-09-29 | End: 2021-10-13 | Stop reason: HOSPADM

## 2021-09-29 RX ORDER — ACETAMINOPHEN 650 MG/1
650 SUPPOSITORY RECTAL EVERY 6 HOURS PRN
Status: DISCONTINUED | OUTPATIENT
Start: 2021-09-29 | End: 2021-10-13 | Stop reason: HOSPADM

## 2021-09-29 RX ORDER — FOLIC ACID 1 MG/1
1 TABLET ORAL DAILY
Status: DISCONTINUED | OUTPATIENT
Start: 2021-09-30 | End: 2021-10-13 | Stop reason: HOSPADM

## 2021-09-29 RX ORDER — DEXTROSE MONOHYDRATE 50 MG/ML
100 INJECTION, SOLUTION INTRAVENOUS PRN
Status: DISCONTINUED | OUTPATIENT
Start: 2021-09-29 | End: 2021-10-13 | Stop reason: HOSPADM

## 2021-09-29 RX ORDER — DILTIAZEM HYDROCHLORIDE 60 MG/1
60 TABLET, FILM COATED ORAL ONCE
Status: COMPLETED | OUTPATIENT
Start: 2021-09-29 | End: 2021-09-29

## 2021-09-29 RX ORDER — WARFARIN SODIUM 2 MG/1
2 TABLET ORAL EVERY OTHER DAY
Status: DISCONTINUED | OUTPATIENT
Start: 2021-09-29 | End: 2021-10-03 | Stop reason: DRUGHIGH

## 2021-09-29 RX ADMIN — ACETAMINOPHEN 650 MG: 325 TABLET ORAL at 21:37

## 2021-09-29 RX ADMIN — SODIUM CHLORIDE: 9 INJECTION, SOLUTION INTRAVENOUS at 13:30

## 2021-09-29 RX ADMIN — METFORMIN HYDROCHLORIDE 500 MG: 500 TABLET ORAL at 21:36

## 2021-09-29 RX ADMIN — WARFARIN SODIUM 2 MG: 2 TABLET ORAL at 21:39

## 2021-09-29 RX ADMIN — BACLOFEN 5 MG: 10 TABLET ORAL at 22:20

## 2021-09-29 RX ADMIN — DILTIAZEM HYDROCHLORIDE 60 MG: 60 TABLET, FILM COATED ORAL at 21:37

## 2021-09-29 RX ADMIN — CARBIDOPA AND LEVODOPA 2 TABLET: 25; 100 TABLET ORAL at 21:37

## 2021-09-29 RX ADMIN — CEFTRIAXONE SODIUM 1000 MG: 1 INJECTION, POWDER, FOR SOLUTION INTRAMUSCULAR; INTRAVENOUS at 17:42

## 2021-09-29 RX ADMIN — DILTIAZEM HYDROCHLORIDE 60 MG: 60 TABLET, FILM COATED ORAL at 15:21

## 2021-09-29 RX ADMIN — AMANTADINE HYDROCHLORIDE 100 MG: 100 CAPSULE, LIQUID FILLED ORAL at 21:38

## 2021-09-29 RX ADMIN — FAMOTIDINE 20 MG: 20 TABLET ORAL at 21:37

## 2021-09-29 ASSESSMENT — PAIN SCALES - GENERAL: PAINLEVEL_OUTOF10: 5

## 2021-09-29 NOTE — ED TRIAGE NOTES
Pt to ED per EMS after pt reported rapid heart rate. Pt hx of A. Fib RVR. Per EMS pt currently in A.fib RVR with a rate of 140's. Pt hx of parkinson's, recently started on a new medications for that. Denies CP or Shortness of breath.

## 2021-09-29 NOTE — H&P
Hospitalist H&P Note:   Date of Service: 9/29/2021   ? CHIEF COMPLAINT:   Uncontrolled heart rate    HISTORY OF PRESENT ILLNESS (HPI):   Ms. Crystal Paul, a 78y.o. year old female who  has a past medical history of Atrial fibrillation ([facility] [address] Back pain, Gallstone, H/O cardiovascular stress test, H/O Doppler ultrasound, H/O echocardiogram, History of echocardiogram, History of Holter monitoring, Hx of Carotid Doppler ultrasound, Leg cramping, Recurrent urinary tract infection, Spinal stenosis of lumbar region, Vitamin B12 deficiency, and Vitamin D deficiency. Presented to ED with family with a concern of irregular heartbeat and generalized weakness with confusion. At baseline patient has dementia and Parkinson's disease and could not provide any history to me. As per daughter at bedside, patient has not been feeling well from the last couple of days with increasing confusion and weakness. Daughter also reports that patient is more somnolent last few days since her neurologist increased doses of her Parkinson medication even though it helped a little bit during the 1st- week. Daughter also reported that heart rate seem to be going up to 130s at home. Otherwise patient herself denied any significant complaints of chest pain, shortness of breath, fever/chills, and/V/abdominal pain, bowel/bladder habit changes. On presentation, Blood pressure 138/84, pulse 91, temperature 98 °F (36.7 °C), temperature source Oral, resp. rate 15, SpO2 98 %, not currently breastfeeding. PAST MEDICAL HISTORY   Past Medical History:   Diagnosis Date    Atrial fibrillation ([facility] [address] 01/27/2016    per holter monitor    Back pain     severe degeneration    Gallstone     H/O cardiovascular stress test 8/12/2014    cardiolite-normal, no ischemia, EF70%    H/O Doppler ultrasound 10/1/14    Carotid doppler. No hemodynamically significant stenosis bilaterally.     H/O echocardiogram 08/12/2014    EF >57%, mild mitral and (Patient taking differently: Take 500 mg by mouth Daily with supper 1 with evening meds.) 90 tablet 1    levothyroxine (SYNTHROID) 50 MCG tablet Take 1 tablet by mouth Daily 90 tablet 1    warfarin (COUMADIN) 2 MG tablet TAKE 1 TABLET BY MOUTH ONCE DAILY (Patient taking differently: Take by mouth See Admin Instructions 09/29/21 Patient alternates b/t 2 mg and 1 mg every other day, takes at HS last dose 09/28/21 1 mg) 90 tablet 1    amantadine (SYMMETREL) 100 MG capsule Take 1 capsule by mouth daily (Patient taking differently: Take 100 mg by mouth nightly ) 90 capsule 1    baclofen (LIORESAL) 10 MG tablet TAKE ONE-HALF (1/2) TABLET BY MOUTH SIX (6) TIMES A DAY AND ONE (1) TABLET BY MOUTH IN EVENING (Patient taking differently: Take 5 mg by mouth every 4 hours as needed Patient does not take this routinely very PRN) 300 tablet 0    vitamin B-12 (CYANOCOBALAMIN) 1000 MCG tablet Take 1,000 mcg by mouth every other day       Cholecalciferol (VITAMIN D) 2000 UNITS CAPS capsule Take 1 capsule by mouth.  Accu-Chek Softclix Lancets MISC USE 1 LANCET ONCE A  each 2    blood glucose test strips (ACCU-CHEK HIEN PLUS) strip TEST BLOOD SUGAR DAILY AS NEEDED 100 strip 3      ?   ? REVIEW OF SYSTEMS:   All systems were reviewed and all were negative except for those mentioned in HPI. PHYSICAL EXAM:   Blood pressure 138/84, pulse 91, temperature 98 °F (36.7 °C), temperature source Oral, resp. rate 15, SpO2 98 %, not currently breastfeeding. . There is no height or weight on file to calculate BMI. CONSTITUTIONAL: Awake, slightly confused  HENT: NC/AT Ear: normal, patent without effusion Nose: no deformities, nares patent  EYES:Conjunctiva normal. No discharge. NECK: Neck supple,No JVD /Thyromegaly/LAD   RESP:No chest wall deformities or tenderness. No wheezing or rales. B/L air entry positive+  CVS: Irregular rate and rhythm. S1 and S2 normal, no murmurs, clicks, gallops or rubs  GI: Soft, ND/NT,No guarding/rebound/mass/organomegaly. Bowel sounds are normal.    MUSCULAR/EXT:  no pedal edema, no clubbing or cyanosis,Pulses 2+ B/L  CNS: Awake, alert.   B/L upper extremity rigidity +, no focal motor weakness/sensory loss  MOOD/PSYCH: Difficult to assess but seem to be okay  SKIN: Warm and dry,No rashes    Lab results:   Results for orders placed or performed during the hospital encounter of 09/29/21   CBC Auto Differential   Result Value Ref Range    WBC 9.0 4.0 - 10.5 K/CU MM    RBC 4.22 4.2 - 5.4 M/CU MM    Hemoglobin 14.0 12.5 - 16.0 GM/DL    Hematocrit 44.6 37 - 47 %    .7 (H) 78 - 100 FL    MCH 33.2 (H) 27 - 31 PG    MCHC 31.4 (L) 32.0 - 36.0 %    RDW 12.5 11.7 - 14.9 %    Platelets 634 441 - 931 K/CU MM    MPV 10.1 7.5 - 11.1 FL    Differential Type AUTOMATED DIFFERENTIAL     Segs Relative 70.6 (H) 36 - 66 %    Lymphocytes % 17.9 (L) 24 - 44 %    Monocytes % 9.1 (H) 0 - 4 %    Eosinophils % 0.9 0 - 3 %    Basophils % 0.8 0 - 1 %    Segs Absolute 6.4 K/CU MM    Lymphocytes Absolute 1.6 K/CU MM    Monocytes Absolute 0.8 K/CU MM    Eosinophils Absolute 0.1 K/CU MM    Basophils Absolute 0.1 K/CU MM    Nucleated RBC % 0.0 %    Total Nucleated RBC 0.0 K/CU MM    Total Immature Neutrophil 0.06 K/CU MM    Immature Neutrophil % 0.7 (H) 0 - 0.43 %   Comprehensive Metabolic Panel   Result Value Ref Range    Sodium 135 135 - 145 MMOL/L    Potassium 4.1 3.5 - 5.1 MMOL/L    Chloride 102 99 - 110 mMol/L    CO2 21 21 - 32 MMOL/L    BUN 30 (H) 6 - 23 MG/DL    CREATININE 0.8 0.6 - 1.1 MG/DL    Glucose 146 (H) 70 - 99 MG/DL    Calcium 9.2 8.3 - 10.6 MG/DL    Albumin 4.1 3.4 - 5.0 GM/DL    Total Protein 6.6 6.4 - 8.2 GM/DL    Total Bilirubin 0.7 0.0 - 1.0 MG/DL    ALT <5 (L) 10 - 40 U/L    AST 11 (L) 15 - 37 IU/L    Alkaline Phosphatase 71 40 - 129 IU/L    GFR Non-African American >60 >60 mL/min/1.73m2    GFR African American >60 >60 mL/min/1.73m2    Anion Gap 12 4 - 16   Troponin   Result Value Ref Range    Troponin T 0.013 (H) <0.01 NG/ML   Brain Natriuretic Peptide   Result Value Ref Range    Pro-BNP 3,846 (H) <300 PG/ML   Magnesium   Result Value Ref Range    Magnesium 1.8 1.8 - 2.4 mg/dl   TSH without Reflex   Result Value Ref Range    TSH, High Sensitivity 2.480 0.270 - 4.20 uIu/ml   Urinalysis   Result Value Ref Range    Color, UA YELLOW YELLOW    Clarity, UA SLIGHTLY CLOUDY (A) CLEAR    Glucose, Urine NEGATIVE NEGATIVE MG/DL    Bilirubin Urine NEGATIVE NEGATIVE MG/DL    Ketones, Urine SMALL (A) NEGATIVE MG/DL    Specific Gravity, UA 1.024 1.001 - 1.035    Blood, Urine LARGE (A) NEGATIVE    pH, Urine 5.0 5.0 - 8.0    Protein, UA 30 (A) NEGATIVE MG/DL    Urobilinogen, Urine NEGATIVE 0.2 - 1.0 MG/DL    Nitrite Urine, Quantitative POSITIVE (A) NEGATIVE    Leukocyte Esterase, Urine LARGE (A) NEGATIVE    RBC, UA 1,607 (H) 0 - 6 /HPF    WBC,  (H) 0 - 5 /HPF    Bacteria, UA MANY (A) NEGATIVE /HPF    Squam Epithel, UA 3 /HPF    Mucus, UA RARE (A) NEGATIVE HPF    Trichomonas, UA NONE SEEN NONE SEEN /HPF   CK   Result Value Ref Range    Total CK 42 26 - 140 IU/L   Protime/INR & PTT   Result Value Ref Range    Protime 25.7 (H) 11.7 - 14.5 SECONDS    INR 1.98 INDEX    aPTT 36.5 25.1 - 37.1 SECONDS   EKG 12 Lead   Result Value Ref Range    Ventricular Rate 110 BPM    QRS Duration 86 ms    Q-T Interval 310 ms    QTc Calculation (Bazett) 419 ms    R Axis -23 degrees    T Axis 83 degrees    Diagnosis       Atrial fibrillation with rapid ventricular response  Nonspecific ST and T wave abnormality  Abnormal ECG  When compared with ECG of 11-SEP-2020 20:32,  Atrial fibrillation has replaced Sinus rhythm  Vent. rate has increased BY  38 BPM  Confirmed by [facility] Desirae TAYLOR ([id number]) on 9/29/2021 11:59:38 AM       XR CHEST PORTABLE   Final Result   No evidence of acute cardiopulmonary disease. CT HEAD WO CONTRAST   Final Result   1. No acute intracranial abnormality.               ASSESSMENT/IMPRESSION:     -UTI start cefepime and IVF pending urine culture sent from ED.  -A. fib RVR responded well to Cardizem IV push and Cardizem p.o. in the ED. Cardio was consulted from ED. Start Cardizem 60 mg every 6 as per cardio. Patient is already on Coumadin at home. Pharmacy consult for Coumadin dosing.  -Parkinson's disease continue current dose of Sinemet and amantadine. While the daughter is concerned with current dosing-consult neurology for further recommendation.  -Diabetes mellitus continue home Metformin and add SSI. Follow Accu-Cheks  -Hypothyroidism continue Synthroid. Normal TSH. Old records reviewed. CODE STATUS full code    Care plan discussed with patient and her daughter at bedside. Understood and agreed.   MD German   Hospitalist at [facility]

## 2021-09-29 NOTE — ED PROVIDER NOTES
her increased dose of Parkinson's medication. No other changes in appetite, vomiting diarrhea or urinary complaints. Patient is denying any pain or shortness of breath. Patient is vaccinated for COVID-19. ROS: Per patient and daughter  General:  No fevers, no chills, +generalized weakness  Cardiovascular:  See HPI   Respiratory:  No shortness of breath, no cough, no wheezing  Gastrointestinal:  No pain, no nausea, no vomiting, no diarrhea  Musculoskeletal:  No muscle pain, no joint pain  Skin:  No rash, no pruritis, no easy bruising  Neurologic: See HPI  Psychiatric:  No anxiety  Genitourinary:  No dysuria, no hematuria  Extremities:  No edema    Past Medical History:   Diagnosis Date    Atrial fibrillation ([facility] [address] 01/27/2016    per holter monitor    Back pain     severe degeneration    Gallstone     H/O cardiovascular stress test 8/12/2014    cardiolite-normal, no ischemia, EF70%    H/O Doppler ultrasound 10/1/14    Carotid doppler. No hemodynamically significant stenosis bilaterally.  H/O echocardiogram 08/12/2014    EF >57%, mild mitral and tricuspid regurg, normal LV systolic but abnormal diastolic function, no pericardial effusion.  History of echocardiogram 12/04/2020    EF 50%, Mild septal asymmetric hypertrophy, Grade II DD, Mildly dilated left atrium, Normal pulmonary artery pressure, no pericardial effusion    History of Holter monitoring 1/27/2016    new onset afib    Hx of Carotid Doppler ultrasound 12/04/2020    Mild 0-49% disease of the bilateral ICA, Normal vertebral flow    Leg cramping     Recurrent urinary tract infection     Spinal stenosis of lumbar region     Vitamin B12 deficiency     Vitamin D deficiency      History reviewed. No pertinent surgical history.   Family History   Problem Relation Age of Onset    High Blood Pressure Sister     Heart Disease Brother      Social History     Socioeconomic History    Marital status:      Spouse name: Not on file    Number of children: Not on file    Years of education: Not on file    Highest education level: Not on file   Occupational History    Not on file   Tobacco Use    Smoking status: Never Smoker    Smokeless tobacco: Never Used   Vaping Use    Vaping Use: Never used   Substance and Sexual Activity    Alcohol use: No     Comment: caffeine 1 pop a day    Drug use: No    Sexual activity: Not on file   Other Topics Concern    Not on file   Social History Narrative    Not on file     Social Determinants of Health     Financial Resource Strain: Low Risk     Difficulty of Paying Living Expenses: Not hard at all   Food Insecurity: No Food Insecurity    Worried About [facility] in the Last Year: Never true    [facility] in the Last Year: Never true   Transportation Needs:     Lack of Transportation (Medical):      Lack of Transportation (Non-Medical):    Physical Activity:     Days of Exercise per Week:     Minutes of Exercise per Session:    Stress:     Feeling of Stress :    Social Connections:     Frequency of Communication with Friends and Family:     Frequency of Social Gatherings with Friends and Family:     Attends Synagogue Services:     Active Member of Clubs or Organizations:     Attends Club or Organization Meetings:     Marital Status:    Intimate Partner Violence:     Fear of Current or Ex-Partner:     Emotionally Abused:     Physically Abused:     Sexually Abused:      Current Facility-Administered Medications   Medication Dose Route Frequency Provider Last Rate Last Admin    0.9 % sodium chloride infusion   IntraVENous Continuous Korinne Lusterio, PA-C 150 mL/hr at 09/29/21 1330 New Bag at 09/29/21 1330    cefTRIAXone (ROCEPHIN) 1000 mg IVPB in 50 mL D5W minibag  1,000 mg IntraVENous Once Cesar Karimi PA-C         Current Outpatient Medications   Medication Sig Dispense Refill    carbidopa-levodopa (SINEMET)  MG per tablet Take 2 tablets by mouth 3 times daily 09/29/21 Daughter reports this dosing is a trial basis. patient was receiving Stalevo -200 mg tablets TID. Per daughter patient appears to groggy with the 2 tables TID and started decreasing dosage.  folic acid (FOLVITE) 1 MG tablet TAKE ONE (1) TABLET BY MOUTH ONCE DAILY 90 tablet 1    metFORMIN (GLUCOPHAGE) 500 MG tablet 1 with evening meds. (Patient taking differently: Take 500 mg by mouth Daily with supper 1 with evening meds.) 90 tablet 1    levothyroxine (SYNTHROID) 50 MCG tablet Take 1 tablet by mouth Daily 90 tablet 1    warfarin (COUMADIN) 2 MG tablet TAKE 1 TABLET BY MOUTH ONCE DAILY (Patient taking differently: Take by mouth See Admin Instructions 09/29/21 Patient alternates b/t 2 mg and 1 mg every other day, takes at HS last dose 09/28/21 1 mg) 90 tablet 1    amantadine (SYMMETREL) 100 MG capsule Take 1 capsule by mouth daily (Patient taking differently: Take 100 mg by mouth nightly ) 90 capsule 1    baclofen (LIORESAL) 10 MG tablet TAKE ONE-HALF (1/2) TABLET BY MOUTH SIX (6) TIMES A DAY AND ONE (1) TABLET BY MOUTH IN EVENING (Patient taking differently: Take 5 mg by mouth every 4 hours as needed Patient does not take this routinely very PRN) 300 tablet 0    acetaminophen (TYLENOL) 325 MG tablet Take 650 mg by mouth 3 times daily This is routine for patient      vitamin B-12 (CYANOCOBALAMIN) 1000 MCG tablet Take 1,000 mcg by mouth every other day       Cholecalciferol (VITAMIN D) 2000 UNITS CAPS capsule Take 1 capsule by mouth.       Accu-Chek Softclix Lancets MISC USE 1 LANCET ONCE A  each 2    blood glucose test strips (ACCU-CHEK HIEN PLUS) strip TEST BLOOD SUGAR DAILY AS NEEDED 100 strip 3     Allergies   Allergen Reactions    Ciprofibrate     Ciprofloxacin Other (See Comments)     Anorexia      Pregabalin        Nursing Notes Reviewed  PHYSICAL EXAM    VITAL SIGNS: /84   Pulse 91   Temp 98 °F (36.7 °C) (Oral)   Resp 15   SpO2 98%    Constitutional:  Well developed, Well nourished, In no acute distress  Head:  Normocephalic, Atraumatic  Eyes: EOMI. Sclera clear. Conjunctiva normal, No discharge. Neck/Lymphatics: Supple, no JVD, No lymphadenopathy  Cardiovascular: Rate 86 bpm, irregularly irregular rhythm. Peripheral Vascular: Distal pulses 2+, Capillary refill <2seconds  Respiratory:  Respirations nonnlabored, Clear to auscultation bilaterally, No retractions  Abdomen: Bowel sounds normal in all quadrants, Soft, Non tender/Nondistended, No palpable abdominal masses. Musculoskeletal: BUE/BLE symmetrical without atrophy or deformities  Integument:  Warm, Dry, Intact, Skin turgor and texture normal  Neurologic:  Alert & oriented to self and place, not time, No focal deficits noted. Cranial nerves II through XII grossly intact. No slurred speech. No facial droop. Globally weak and deconditioned, following commands. Chronic weakness of the left upper and lower extremity and at baseline per family at bedside.     Psychiatric:  Affect appropriate      I have reviewed and interpreted all of the currently available lab results from this visit (if applicable):  Results for orders placed or performed during the hospital encounter of 09/29/21   CBC Auto Differential   Result Value Ref Range    WBC 9.0 4.0 - 10.5 K/CU MM    RBC 4.22 4.2 - 5.4 M/CU MM    Hemoglobin 14.0 12.5 - 16.0 GM/DL    Hematocrit 44.6 37 - 47 %    .7 (H) 78 - 100 FL    MCH 33.2 (H) 27 - 31 PG    MCHC 31.4 (L) 32.0 - 36.0 %    RDW 12.5 11.7 - 14.9 %    Platelets 146 781 - 295 K/CU MM    MPV 10.1 7.5 - 11.1 FL    Differential Type AUTOMATED DIFFERENTIAL     Segs Relative 70.6 (H) 36 - 66 %    Lymphocytes % 17.9 (L) 24 - 44 %    Monocytes % 9.1 (H) 0 - 4 %    Eosinophils % 0.9 0 - 3 %    Basophils % 0.8 0 - 1 %    Segs Absolute 6.4 K/CU MM    Lymphocytes Absolute 1.6 K/CU MM    Monocytes Absolute 0.8 K/CU MM    Eosinophils Absolute 0.1 K/CU MM    Basophils Absolute 0.1 K/CU MM    Nucleated RBC % 0.0 %    Total Nucleated RBC 0.0 K/CU MM    Total Immature Neutrophil 0.06 K/CU MM    Immature Neutrophil % 0.7 (H) 0 - 0.43 %   Comprehensive Metabolic Panel   Result Value Ref Range    Sodium 135 135 - 145 MMOL/L    Potassium 4.1 3.5 - 5.1 MMOL/L    Chloride 102 99 - 110 mMol/L    CO2 21 21 - 32 MMOL/L    BUN 30 (H) 6 - 23 MG/DL    CREATININE 0.8 0.6 - 1.1 MG/DL    Glucose 146 (H) 70 - 99 MG/DL    Calcium 9.2 8.3 - 10.6 MG/DL    Albumin 4.1 3.4 - 5.0 GM/DL    Total Protein 6.6 6.4 - 8.2 GM/DL    Total Bilirubin 0.7 0.0 - 1.0 MG/DL    ALT <5 (L) 10 - 40 U/L    AST 11 (L) 15 - 37 IU/L    Alkaline Phosphatase 71 40 - 129 IU/L    GFR Non-African American >60 >60 mL/min/1.73m2    GFR African American >60 >60 mL/min/1.73m2    Anion Gap 12 4 - 16   Troponin   Result Value Ref Range    Troponin T 0.013 (H) <0.01 NG/ML   Brain Natriuretic Peptide   Result Value Ref Range    Pro-BNP 3,846 (H) <300 PG/ML   Magnesium   Result Value Ref Range    Magnesium 1.8 1.8 - 2.4 mg/dl   TSH without Reflex   Result Value Ref Range    TSH, High Sensitivity 2.480 0.270 - 4.20 uIu/ml   Urinalysis   Result Value Ref Range    Color, UA YELLOW YELLOW    Clarity, UA SLIGHTLY CLOUDY (A) CLEAR    Glucose, Urine NEGATIVE NEGATIVE MG/DL    Bilirubin Urine NEGATIVE NEGATIVE MG/DL    Ketones, Urine SMALL (A) NEGATIVE MG/DL    Specific Gravity, UA 1.024 1.001 - 1.035    Blood, Urine LARGE (A) NEGATIVE    pH, Urine 5.0 5.0 - 8.0    Protein, UA 30 (A) NEGATIVE MG/DL    Urobilinogen, Urine NEGATIVE 0.2 - 1.0 MG/DL    Nitrite Urine, Quantitative POSITIVE (A) NEGATIVE    Leukocyte Esterase, Urine LARGE (A) NEGATIVE    RBC, UA 1,607 (H) 0 - 6 /HPF    WBC,  (H) 0 - 5 /HPF    Bacteria, UA MANY (A) NEGATIVE /HPF    Squam Epithel, UA 3 /HPF    Mucus, UA RARE (A) NEGATIVE HPF    Trichomonas, UA NONE SEEN NONE SEEN /HPF   CK   Result Value Ref Range    Total CK 42 26 - 140 IU/L   Protime/INR & PTT   Result Value Ref Range    Protime 25.7 (H) 11.7 - 14.5 SECONDS    INR 1.98 INDEX    aPTT 36.5 25.1 - 37.1 SECONDS   EKG 12 Lead   Result Value Ref Range    Ventricular Rate 110 BPM    QRS Duration 86 ms    Q-T Interval 310 ms    QTc Calculation (Bazett) 419 ms    R Axis -23 degrees    T Axis 83 degrees    Diagnosis       Atrial fibrillation with rapid ventricular response  Nonspecific ST and T wave abnormality  Abnormal ECG  When compared with ECG of 11-SEP-2020 20:32,  Atrial fibrillation has replaced Sinus rhythm  Vent. rate has increased BY  38 BPM  Confirmed by Ramos Keith MD, Radha Barr ([id number]) on 9/29/2021 11:59:38 AM          Radiographs (if obtained):  [] The following radiograph was interpreted by myself in the absence of a radiologist:   [] Radiologist's Report Reviewed:  XR CHEST PORTABLE   Final Result   No evidence of acute cardiopulmonary disease. CT HEAD WO CONTRAST   Final Result   1. No acute intracranial abnormality. XR CHEST PORTABLE (Final result)  Result time 09/29/21 12:38:44  Final result by Briana Dunlap MD (09/29/21 12:38:44)                Impression:    No evidence of acute cardiopulmonary disease. Narrative:    EXAMINATION:   ONE XRAY VIEW OF THE CHEST     9/29/2021 12:34 pm     COMPARISON:   Chest x-ray 08/01/2015     HISTORY:   ORDERING SYSTEM PROVIDED HISTORY: chest pain   TECHNOLOGIST PROVIDED HISTORY:   Reason for exam:->chest pain   Reason for Exam: chest pain     FINDINGS:   The lungs are clear.  Costophrenic angles are clear.  Cardiac and mediastinal   structures are unchanged.  The bones are unchanged.                     CT HEAD WO CONTRAST (Final result)  Result time 09/29/21 12:25:21  Final result by Penny Saha MD (09/29/21 12:25:21)                Impression: 1. No acute intracranial abnormality.              Narrative:    EXAMINATION:   CT OF THE HEAD WITHOUT CONTRAST  9/29/2021 12:04 pm     TECHNIQUE:   CT of the head was performed without the administration of intravenous   contrast. Dose modulation, iterative reconstruction, and/or weight based   adjustment of the mA/kV was utilized to reduce the radiation dose to as low   as reasonably achievable. COMPARISON:   09/11/2020. HISTORY:   ORDERING SYSTEM PROVIDED HISTORY: increased generalized confusion and   weakness x2 days   TECHNOLOGIST PROVIDED HISTORY:   Has a \"code stroke\" or \"stroke alert\" been called? ->No   Reason for exam:->increased generalized confusion and weakness x2 days   Decision Support Exception - unselect if not a suspected or confirmed   emergency medical condition->Emergency Medical Condition (MA)   Reason for Exam: increased generalized confusion and weakness x2 days     FINDINGS:   BRAIN/VENTRICLES: There is no acute intracranial hemorrhage, mass effect or   midline shift. No abnormal extra-axial fluid collection.  The gray-white   differentiation is maintained without evidence of an acute infarct. There is   prominence of the ventricles and sulci due to global parenchymal volume loss. There are nonspecific areas of hypoattenuation within the periventricular and   subcortical white matter, which likely represent chronic microvascular   ischemic change. ORBITS: The visualized portion of the orbits demonstrate no acute abnormality. SINUSES: The visualized paranasal sinuses and mastoid air cells demonstrate   no acute abnormality. SOFT TISSUES/SKULL: No acute abnormality of the visualized skull or soft   tissues.                     EKG Interpretation  Please see ED physician's note - Dr. Mera Schulte - for EKG interpretation        Chart review shows recent radiographs:  No results found. ED COURSE & MEDICAL DECISION MAKING       Vital signs and nursing notes reviewed during ED course. I have independently evaluated this patient . Supervising physician - Dr. Mera Schulte - was present in ED and available for consultation throughout entirety of patient's care.  All pertinent Lab data and radiographic results reviewed with patient at bedside. The patient and/or the family were informed of the results of any tests/labs/imaging, the treatment plan, and time was allotted to answer questions. Clinical Impression:  1. Urinary tract infection without hematuria, site unspecified    2. Generalized weakness    3. Subtherapeutic international normalized ratio (INR)    4. Atrial fibrillation with RVR [facility])        Patient presents with family for concern for increasing generalized weakness, confusion and irregular heart rates. On exam, pleasant 70-year-old female, is a poor historian, generally fatigued and deconditioned but acting at baseline mental status per family at bedside. Family does state the patient is more somnolent at this time as she had just taken her morning dose of Parkinson's medication and acting similar to this after taking her meds. She is otherwise following commands and asymptomatic for any acute complaints. My exam, regular rate however irregularly irregular rhythm, consistent with history of A. fib. Abdomen soft nontender. Calves are symmetrical.  Started patient on IV fluid infusion, placed onto telemetry and continuous pulse ox monitoring. CBC shows normal white count hemoglobin. CMP shows elevated BUN of 30 with otherwise normal renal function. No electrolyte disturbance. Troponin is detectable at 0.013 with a BNP is 3846 . Normal CK, magnesium. TSH is normal. INR is subtherapeutic at 1.98. EKG reveals A. fib with RVR, rate 110. Dulce Maria Gip Chest x-ray shows no evidence of acute cardiopulmonary process or consolidation. CT head also negative for evidence of acute intracranial process. While awaiting UA, will speak with patient's cardiologist recommendation for A. fib RVR management. Patient is otherwise hemodynamically intact with stable blood pressures.     I did consult with Dr. Anette France - cardiology - and discussed patient's history, ED presentation/course including any pertinent laboratory findings and imaging study findings. He/she recommends starting patient on oral Cardizem 60 mg q. every 6 hours, can continue if patient is admitted orally. After starting oral Cardizem, patient's heart rates are more rate controlled in the 80s to 90s more consistently. UA does show signs concerning for UTI with positive nitrites, large leukocytes, 750 white blood cells and many bacteria. Did send for urine culture and started patient on IV antibiotics. Patient will be admitted for UTI management as well as continued rate control medications for A. fib RVR. Plan for admission were discussed with patient and family who verbalized understanding and agreement. I then spoke with hospitalist, Dr. Ada Mai, who agrees to hospital admission. Patient is admitted to the hospital in stable condition. At this time,      In consideration of current COVID19 pandemic, with effort to minimize unnecessary provider exposure, this patient was seen at bedside by me independently. However, in compliance with current hospital DEMARCO/ED protocol, prior to admission I did discuss this patient case with emergency department physician, Dr. Zoe Meza, who did agree with ED workup/evaluation and plan for admission. Of note, this Pt was NOT admitted to the ICU. Diagnosis and plan discussed in detail with patient who understands and agrees. Disposition referral (if applicable):  No follow-up provider specified.     Disposition medications (if applicable):  New Prescriptions    No medications on file         (Please note that portions of this note may have been completed with a voice recognition program. Efforts were made to edit the dictations but occasionally words are mis-transcribed.)          Pavel Gupta PA-C  09/29/21 0446

## 2021-09-29 NOTE — TELEPHONE ENCOUNTER
Daughter Polo Santa called concerned   She feels her Mom is in AFIB   Her heart is racing and she feels   Very fatigued just doesn't feel   Right

## 2021-09-29 NOTE — ED NOTES
Report given to [employer].       Jamaal 9101, [address] [facility]  09/29/21 [id number]

## 2021-09-29 NOTE — ED NOTES
Bed: ED-17  Expected date:   Expected time:   Means of arrival:   Comments:  [address], RN  09/29/21 6831

## 2021-09-29 NOTE — CARE COORDINATION
MARYW received consult on patient. LSW reviewed chart and discussed with Korinne PA-C. Patient came to ED due to being generally weak. DX of Parkinsons. Patients' medication has just been raised. Barbara Preston PA-C reported that patient was still in process of getting tests on patient and is unsure of admission status at this point. CM went to see patient, but patient was in middle of getting a CAT scan. Daughter was present and reported that family is not interested in placing patient in a SNF. Daughter not also very interested in [facility] b/c patient -- according to daughter, \"patient needs more assistance than healthcare can offer. \" CM did give daughter a list of SNFs' in area in case they would want to use down the road, a list of [id number]      MARYW shared above with Korinne PA-C.

## 2021-09-29 NOTE — PROGRESS NOTES
acetaminophen (TYLENOL) 325 MG tablet Take 650 mg by mouth 3 times daily This is routine for patient   Yes Historical Provider, MD   vitamin B-12 (CYANOCOBALAMIN) 1000 MCG tablet Take 1,000 mcg by mouth every other day    Yes Historical Provider, MD   Cholecalciferol (VITAMIN D) 2000 UNITS CAPS capsule Take 1 capsule by mouth. Yes Historical Provider, MD   blood glucose test strips (ACCU-CHEK HIEN PLUS) strip TEST BLOOD SUGAR DAILY AS NEEDED 12/28/20   Sharon Doe MD     Medications added or changed (ex. new medication, dosage change, interval change, formulation change): Warfarin therapy clarified  Stalevo changed to carbidopa/levodopa see comments  Vitamin B 12 changed to every other day    Medications removed from list (include reason, ex. noncompliance, medication cost, therapy complete etc.):   Polyethylene no longer taking    Comments:  Medication list reviewed with patient's daughter and insurance claims verified. Stalevo vs Carbidopa/levodopa. Daughter reports this dosing is a trial basis. Patient was receiving Stalevo -200 mg tablets TID. Per daughter patient appears to groggy with the 2 tables TID and started decreasing dosage. Per daughter patient took one tablet of the carbidopa/levodopa  Warfarin therapy updated as patient alternates 2 mg and 1 mg every other day, takes at HS last dose 09/28/21 of 1 mg. Per anti-coag note 09/03/21.     To my knowledge the above medication history is accurate as of 9/29/2021 12:25 PM.   Paola Alvarado, MUKUL   9/29/2021 12:25 PM

## 2021-09-29 NOTE — TELEPHONE ENCOUNTER
Spoke with daughter, patient's heart rate high at times, per pulse ox currently 86, patient very symptomatic. Offered OV for EKG which may be difficult to transport due to parkinson's. suggested calling squad and transport to ED.  She will discuss with patient and call back

## 2021-09-29 NOTE — TELEPHONE ENCOUNTER
Daughter will call jimmy. Patient recently increased parkinson's dose, unsure about giving that.  Advised to call PCP

## 2021-09-29 NOTE — ED NOTES
Phone report given to [facility] RN; all questions answered     Soraida Meredith RN  09/29/21 1934

## 2021-09-30 ENCOUNTER — TELEPHONE (OUTPATIENT)
Dept: FAMILY MEDICINE CLINIC | Age: 81
End: 2021-09-30

## 2021-09-30 LAB
ANION GAP SERPL CALCULATED.3IONS-SCNC: 12 MMOL/L (ref 4–16)
BUN BLDV-MCNC: 26 MG/DL (ref 6–23)
CALCIUM SERPL-MCNC: 8.3 MG/DL (ref 8.3–10.6)
CHLORIDE BLD-SCNC: 110 MMOL/L (ref 99–110)
CO2: 21 MMOL/L (ref 21–32)
CREAT SERPL-MCNC: 1 MG/DL (ref 0.6–1.1)
GFR AFRICAN AMERICAN: >60 ML/MIN/1.73M2
GFR NON-AFRICAN AMERICAN: 53 ML/MIN/1.73M2
GLUCOSE BLD-MCNC: 105 MG/DL (ref 70–99)
GLUCOSE BLD-MCNC: 110 MG/DL (ref 70–99)
GLUCOSE BLD-MCNC: 112 MG/DL (ref 70–99)
GLUCOSE BLD-MCNC: 113 MG/DL (ref 70–99)
GLUCOSE BLD-MCNC: 96 MG/DL (ref 70–99)
HCT VFR BLD CALC: 37.6 % (ref 37–47)
HEMOGLOBIN: 11.9 GM/DL (ref 12.5–16)
INR BLD: 2.31 INDEX
MAGNESIUM: 1.6 MG/DL (ref 1.8–2.4)
MCH RBC QN AUTO: 32.9 PG (ref 27–31)
MCHC RBC AUTO-ENTMCNC: 31.6 % (ref 32–36)
MCV RBC AUTO: 103.9 FL (ref 78–100)
PDW BLD-RTO: 12.6 % (ref 11.7–14.9)
PHOSPHORUS: 3 MG/DL (ref 2.5–4.9)
PLATELET # BLD: 237 K/CU MM (ref 140–440)
PMV BLD AUTO: 10.2 FL (ref 7.5–11.1)
POTASSIUM SERPL-SCNC: 3.9 MMOL/L (ref 3.5–5.1)
PROTHROMBIN TIME: 30.1 SECONDS (ref 11.7–14.5)
RBC # BLD: 3.62 M/CU MM (ref 4.2–5.4)
SODIUM BLD-SCNC: 143 MMOL/L (ref 135–145)
WBC # BLD: 6.4 K/CU MM (ref 4–10.5)

## 2021-09-30 PROCEDURE — 36415 COLL VENOUS BLD VENIPUNCTURE: CPT

## 2021-09-30 PROCEDURE — 94761 N-INVAS EAR/PLS OXIMETRY MLT: CPT

## 2021-09-30 PROCEDURE — 99223 1ST HOSP IP/OBS HIGH 75: CPT | Performed by: INTERNAL MEDICINE

## 2021-09-30 PROCEDURE — 6370000000 HC RX 637 (ALT 250 FOR IP): Performed by: INTERNAL MEDICINE

## 2021-09-30 PROCEDURE — 85610 PROTHROMBIN TIME: CPT

## 2021-09-30 PROCEDURE — APPNB60 APP NON BILLABLE TIME 46-60 MINS: Performed by: NURSE PRACTITIONER

## 2021-09-30 PROCEDURE — 99222 1ST HOSP IP/OBS MODERATE 55: CPT | Performed by: PSYCHIATRY & NEUROLOGY

## 2021-09-30 PROCEDURE — 82962 GLUCOSE BLOOD TEST: CPT

## 2021-09-30 PROCEDURE — 1200000000 HC SEMI PRIVATE

## 2021-09-30 PROCEDURE — 99222 1ST HOSP IP/OBS MODERATE 55: CPT | Performed by: INTERNAL MEDICINE

## 2021-09-30 PROCEDURE — 85027 COMPLETE CBC AUTOMATED: CPT

## 2021-09-30 PROCEDURE — 83735 ASSAY OF MAGNESIUM: CPT

## 2021-09-30 PROCEDURE — 80048 BASIC METABOLIC PNL TOTAL CA: CPT

## 2021-09-30 PROCEDURE — 2580000003 HC RX 258: Performed by: INTERNAL MEDICINE

## 2021-09-30 PROCEDURE — 6360000002 HC RX W HCPCS: Performed by: INTERNAL MEDICINE

## 2021-09-30 PROCEDURE — 6370000000 HC RX 637 (ALT 250 FOR IP): Performed by: NURSE PRACTITIONER

## 2021-09-30 PROCEDURE — 84100 ASSAY OF PHOSPHORUS: CPT

## 2021-09-30 RX ORDER — DIGOXIN 125 MCG
125 TABLET ORAL DAILY
Status: DISCONTINUED | OUTPATIENT
Start: 2021-09-30 | End: 2021-10-09

## 2021-09-30 RX ADMIN — ACETAMINOPHEN 650 MG: 325 TABLET ORAL at 15:03

## 2021-09-30 RX ADMIN — ACETAMINOPHEN 650 MG: 325 TABLET ORAL at 09:09

## 2021-09-30 RX ADMIN — FAMOTIDINE 20 MG: 20 TABLET ORAL at 21:25

## 2021-09-30 RX ADMIN — METFORMIN HYDROCHLORIDE 500 MG: 500 TABLET ORAL at 17:55

## 2021-09-30 RX ADMIN — FAMOTIDINE 20 MG: 20 TABLET ORAL at 09:03

## 2021-09-30 RX ADMIN — DILTIAZEM HYDROCHLORIDE 60 MG: 60 TABLET, FILM COATED ORAL at 15:00

## 2021-09-30 RX ADMIN — LEVOTHYROXINE SODIUM 50 MCG: 0.05 TABLET ORAL at 05:43

## 2021-09-30 RX ADMIN — AMANTADINE HYDROCHLORIDE 100 MG: 100 CAPSULE, LIQUID FILLED ORAL at 21:22

## 2021-09-30 RX ADMIN — WARFARIN SODIUM 1 MG: 2 TABLET ORAL at 17:55

## 2021-09-30 RX ADMIN — DILTIAZEM HYDROCHLORIDE 60 MG: 60 TABLET, FILM COATED ORAL at 21:22

## 2021-09-30 RX ADMIN — SODIUM CHLORIDE: 9 INJECTION, SOLUTION INTRAVENOUS at 08:56

## 2021-09-30 RX ADMIN — DIGOXIN 125 MCG: 125 TABLET ORAL at 15:03

## 2021-09-30 RX ADMIN — CARBIDOPA AND LEVODOPA 2 TABLET: 25; 100 TABLET ORAL at 09:03

## 2021-09-30 RX ADMIN — Medication 1000 UNITS: at 09:03

## 2021-09-30 RX ADMIN — CEFEPIME 2000 MG: 2 INJECTION, POWDER, FOR SOLUTION INTRAVENOUS at 05:52

## 2021-09-30 RX ADMIN — ACETAMINOPHEN 650 MG: 325 TABLET ORAL at 21:27

## 2021-09-30 RX ADMIN — SODIUM CHLORIDE: 9 INJECTION, SOLUTION INTRAVENOUS at 17:58

## 2021-09-30 RX ADMIN — DILTIAZEM HYDROCHLORIDE 60 MG: 60 TABLET, FILM COATED ORAL at 05:43

## 2021-09-30 RX ADMIN — CEFEPIME 2000 MG: 2 INJECTION, POWDER, FOR SOLUTION INTRAVENOUS at 18:00

## 2021-09-30 RX ADMIN — CARBIDOPA AND LEVODOPA 2 TABLET: 25; 100 TABLET ORAL at 15:00

## 2021-09-30 RX ADMIN — FOLIC ACID 1 MG: 1 TABLET ORAL at 09:03

## 2021-09-30 ASSESSMENT — PAIN SCALES - GENERAL
PAINLEVEL_OUTOF10: 0
PAINLEVEL_OUTOF10: 5
PAINLEVEL_OUTOF10: 5
PAINLEVEL_OUTOF10: 3

## 2021-09-30 NOTE — PROGRESS NOTES
[address] is a 78 y.o. female on warfarin therapy for Afib. Pharmacy consulted by Dr. Jenniffer Bhagat for monitoring and adjustment of treatment. Indication for anticoagulation: Afib  INR goal: 2-3  Warfarin dose prior to admission: 2mg,1mg alternating days     Pertinent Laboratory Values Recent Labs     09/29/21  1136 09/29/21  1136 09/30/21  0622   INR 1.98   < > 2.31   HGB 14.0   < > 11.9*   HCT 44.6   < > 37.6     --  237    < > = values in this interval not displayed. Assessment/Plan:   Drug Interactions: None at this time    INR 2.31   Will continue home warfarin dose    Pharmacy will continue to monitor and adjust warfarin therapy as indicated    Thank you for the consult.   Khloe De La Fuente [facility]  9/30/2021 9:29 AM

## 2021-09-30 NOTE — TELEPHONE ENCOUNTER
PT IS AT [facility]. PT DOES NOT YET HAVE A DISCHARGE DATE. [facility] [address] CALLED TO GET A VERBAL ORDER FOR HOME HEALTH WHEN SHE IS DISCHARGED.

## 2021-09-30 NOTE — PROGRESS NOTES
Hospitalist Progress Note      Name:  Vickey Solano [facility] /Age/Sex: 1940  (78 y.o. female)   MRN & CSN:  [id number] & [id number] Admission Date/Time: 2021 11:26 AM   Location:  [facility] PCP: Grace Beach MD         Hospital Day: 2      Assessment and Plan:   Patient is a 49-year-old female past medical history of Parkinson's disease, recent increase/change in Parkinson's medication who was admitted for A. fib rapid ventricular response, confusion and found to have urinary tract infection. 1. Paroxysmal atrial fibrillation with rapid ventricular response: Rate controlled  2. Urinary tract infection  3. Confusion associated with lethargy: ?  Due to UTI versus recent increase/change in practice medications  4. History of Parkinson's disease  5. Other medical problem include diabetes, hypothyroidism    Plan:  Patient is awake and alert  Continue Cardizem and Coumadin per cardiology for A. fib  Continue cefepime \"follow urine culture/blood cultures  Continue other medication for chronic medical problems  Continue Sinemet and amantadine  Unsure if her lethargy/confusion is related to recent increase in a change in partners medication  Neurology consulted  PT/OT        DVT prophylaxis: Lovenox   : Total support CODE STATUS      Subjective. :     Patient was seen and examined this morning. No acute events since admission. Objective:   Vitals:   Vitals:    21 0310   BP: 101/63   Pulse: 72   Resp: 16   Temp: 98.2 °F (36.8 °C)   SpO2: 96%         Physical Exam:   GEN: Awake, alert, in no apparent distress awake female, sitting upright in bed in no apparent distress. Appears given age. HEENT: Atraumatic, normocephalic, PERRLA, EOMI  NECK: Supple, no apparent thyromegaly or masses.   RESP: Clear lungs to auscultation  CARDIO/VASC: Regular rate and rhythm, normal S1, S2, no murmurs  GI: Abdomen is soft, nontender, no significant organomegaly noted, bowel sounds present  MSK: No gross

## 2021-09-30 NOTE — CONSULTS
Name:  Zillah Renaldo [facility] /Age/Sex: 1940  (78 y.o. female)   MRN & CSN:  [id number] & [id number] Admission Date/Time: 2021 11:26 AM   Location:  4111/4111-A PCP: Kierra Maldonado, 27 [address] Bermeo Day: 2          Referring physician:  Alvarado Pisano MD         Reason for consultation:  A fib        Thanks for referral.    Information source: patient    CC;  palpitation      HPI:   Thank you for involving me in taking  care of Lauren Herrera who  is a 78 y. o.year  Old female  Presents with  H/o  Afib, DM admitted with UTI , is in afib as well, is chr anticoag , has no CP, SOB                     Past medical history:    has a past medical history of Atrial fibrillation ([address] Back pain, Gallstone, H/O cardiovascular stress test, H/O Doppler ultrasound, H/O echocardiogram, History of echocardiogram, History of Holter monitoring, Hx of Carotid Doppler ultrasound, Leg cramping, Recurrent urinary tract infection, Spinal stenosis of lumbar region, Vitamin B12 deficiency, and Vitamin D deficiency. Past surgical history:   has no past surgical history on file. Social History:   reports that she has never smoked. She has never used smokeless tobacco. She reports that she does not drink alcohol and does not use drugs. Family history:  family history includes Heart Disease in her brother; High Blood Pressure in her sister.     Allergies   Allergen Reactions    Ciprofibrate     Ciprofloxacin Other (See Comments)     Anorexia      Pregabalin        amantadine (SYMMETREL) capsule 100 mg, Nightly  carbidopa-levodopa (SINEMET)  MG per tablet 2 tablet, TID  vitamin D CAPS 1,000 Units, Daily  folic acid (FOLVITE) tablet 1 mg, Daily  levothyroxine (SYNTHROID) tablet 50 mcg, Daily  metFORMIN (GLUCOPHAGE) tablet 500 mg, Dinner  [START ON 10/1/2021] vitamin B-12 (CYANOCOBALAMIN) tablet 1,000 mcg, Every Other Day  0.9 % sodium chloride infusion, Continuous  sodium chloride flush 0.9 % injection 10 mL, 2 times per day  sodium chloride flush 0.9 % injection 10 mL, PRN  0.9 % sodium chloride infusion, PRN  potassium chloride 10 mEq/100 mL IVPB (Peripheral Line), PRN  magnesium sulfate 1000 mg in dextrose 5% 100 mL IVPB, PRN  ondansetron (ZOFRAN-ODT) disintegrating tablet 4 mg, Q8H PRN   Or  ondansetron (ZOFRAN) injection 4 mg, Q6H PRN  polyethylene glycol (GLYCOLAX) packet 17 g, Daily PRN  acetaminophen (TYLENOL) tablet 650 mg, Q6H PRN   Or  acetaminophen (TYLENOL) suppository 650 mg, Q6H PRN  insulin lispro (HUMALOG) injection vial 0-6 Units, TID WC  insulin lispro (HUMALOG) injection vial 0-3 Units, Nightly  famotidine (PEPCID) tablet 20 mg, BID  cefepime (MAXIPIME) 2000 mg IVPB minibag, Q12H  dilTIAZem (CARDIZEM) tablet 60 mg, 3 times per day  traMADol (ULTRAM) tablet 50 mg, Q6H PRN  warfarin (COUMADIN) tablet 2 mg, Every Other Day  warfarin (COUMADIN) tablet 1 mg, Every Other Day  glucose (GLUTOSE) 40 % oral gel 15 g, PRN  dextrose 50 % IV solution, PRN  glucagon (rDNA) injection 1 mg, PRN  dextrose 5 % solution, PRN  baclofen (LIORESAL) tablet 5 mg, Q4H PRN      Current Facility-Administered Medications   Medication Dose Route Frequency Provider Last Rate Last Admin    amantadine (SYMMETREL) capsule 100 mg  100 mg Oral Nightly Quentin Felipe MD   100 mg at 09/29/21 2138    carbidopa-levodopa (SINEMET)  MG per tablet 2 tablet  2 tablet Oral TID Quentin Felipe MD   2 tablet at 09/29/21 2137    vitamin D CAPS 1,000 Units  1 capsule Oral Daily Quentin Felipe MD        folic acid (FOLVITE) tablet 1 mg  1 mg Oral Daily Quentin Felipe MD        levothyroxine (SYNTHROID) tablet 50 mcg  50 mcg Oral Daily Quentin Felipe MD   50 mcg at 09/30/21 0543    metFORMIN (GLUCOPHAGE) tablet 500 mg  500 mg Oral Dinner Quentin Felipe MD   500 mg at 09/29/21 2136    [START ON 10/1/2021] vitamin B-12 (CYANOCOBALAMIN) tablet 1,000 mcg  1,000 mcg Oral Every Other Day Quentin Felipe MD       [facility] 0.9 % sodium chloride infusion   IntraVENous Continuous MD German 75 mL/hr at 09/29/21 2139 Restarted at 09/29/21 2139    sodium chloride flush 0.9 % injection 10 mL  10 mL IntraVENous 2 times per day MD German        sodium chloride flush 0.9 % injection 10 mL  10 mL IntraVENous PRN MD German        0.9 % sodium chloride infusion  25 mL IntraVENous PRN MD German        potassium chloride 10 mEq/100 mL IVPB (Peripheral Line)  10 mEq IntraVENous PRN MD German        magnesium sulfate 1000 mg in dextrose 5% 100 mL IVPB  1,000 mg IntraVENous PRN MD German        ondansetron (ZOFRAN-ODT) disintegrating tablet 4 mg  4 mg Oral Q8H PRN MD German        Or    ondansetron [facility]) injection 4 mg  4 mg IntraVENous Q6H PRN MD German        polyethylene glycol [facility]) packet 17 g  17 g Oral Daily PRN MD German        acetaminophen (TYLENOL) tablet 650 mg  650 mg Oral Q6H PRN MD German   650 mg at 09/29/21 2137    Or    acetaminophen (TYLENOL) suppository 650 mg  650 mg Rectal Q6H PRN MD German        insulin lispro (HUMALOG) injection vial 0-6 Units  0-6 Units SubCUTAneous TID WC MD German        insulin lispro (HUMALOG) injection vial 0-3 Units  0-3 Units SubCUTAneous Nightly MD German        famotidine (PEPCID) tablet 20 mg  20 mg Oral BID MD German   20 mg at 09/29/21 2137    cefepime (MAXIPIME) 2000 mg IVPB minibag  2,000 mg IntraVENous Q12H MD German   Stopped at 09/30/21 0657    dilTIAZem (CARDIZEM) tablet 60 mg  60 mg Oral 3 times per day MD German   60 mg at 09/30/21 0543    traMADol (ULTRAM) tablet 50 mg  50 mg Oral Q6H PRN MD Greman        warfarin (COUMADIN) tablet 2 mg  2 mg Oral Every Other Day MD German   2 mg at 09/29/21 2139    warfarin (COUMADIN) tablet 1 mg  1 mg Oral Every Other Day MD Tammy Porter glucose (GLUTOSE) 40 % oral gel 15 g  15 g Oral PRN Ping Pulido MD        dextrose 50 % IV solution  12.5 g IntraVENous PRN Ping Pulido MD        glucagon (rDNA) injection 1 mg  1 mg IntraMUSCular PRN Ping Pulido MD        dextrose 5 % solution  100 mL/hr IntraVENous PRN Ping Pulido MD        baclofen (LIORESAL) tablet 5 mg  5 mg Oral Q4H PRN BRENNEN Alcala NP   5 mg at 09/29/21 2220     Review of Systems:  All 14 systems reviewed, all negative except for  palpitation    Physical Examination:    /63   Pulse 72   Temp 98.2 °F (36.8 °C) (Oral)   Resp 16   Wt 180 lb 11.2 oz (82 kg)   SpO2 96%   BMI 32.01 kg/m²      Wt Readings from Last 3 Encounters:   09/30/21 180 lb 11.2 oz (82 kg)   05/17/21 190 lb (86.2 kg)   05/20/20 190 lb 0.6 oz (86.2 kg)     Body mass index is 32.01 kg/m². General Appearance:  fair  Head: normocephalic     Eyes: normal, noninjected conjunctiva    ENT: normal mucosa, noninjected throat, normal     NECK: No JVP  No thyromegaly        Cardiovascular: No thrills palpated   Auscultation: Normal S1 and S2,  no murmur   carotid bruit no   Abdominal Aorta no bruit    Respiratory:    Breath sounds Clear = 0    Extremities:  none Edema clubbing ,   no cyanosis    SKIN: Warm and well perfused, no pallor or cyanosis    Vascular exam:  Pedal Pulses: palp  bilaterally        Abdomen:  No masses or tenderness. No organomegaly noted. Neurological:  Oriented to time, place, and person   No focal neurological deficit noted. Psychiatric:normal mood, no anxiety    Lab Review   Recent Labs     09/30/21  0622   WBC 6.4   HGB 11.9*   HCT 37.6         Recent Labs     09/30/21  0622      K 3.9      CO2 21   BUN 26*   CREATININE 1.0     Recent Labs     09/29/21  1136   AST 11*   ALT <5*   BILITOT 0.7   ALKPHOS 71     No results for input(s): TROPONINI in the last 72 hours.   No results found for: BNP  Lab Results   Component Value Date    INR 2.31 09/30/2021    PROTIME 30.1 (H) 09/30/2021           Assessment/Recommendations:     -  Afib: HR control and anticoag, EP cons  - DM stable  - UTI on atb         Bette Cardona MD, 9/30/2021 8:08 AM

## 2021-09-30 NOTE — CARE COORDINATION
Met c pt and dghtr at bedside to initiate discharge planning. Pt lives at home with spouse and cared for by family. Pt does not ambulate much/ if at all. Pt has hospital bed, steady lift, transport wheelchair, BSC. Dghtr states plan will be for pt to return home upon discharge, declined SNF. They are open to [facility], feels nursing would be more beneficial than therapy. Dghtr open to home therapy, but she states she is also a PT and the home PT process is not very efficient. Requested HHC choice and she states they typically use the hospital's HHC. Referral sent to [facility] with [facility] to follow. Discussed discharge transportation and dghtr concerned about ability to get pt into house. Advised pt could transport home via stretcher, pt has a ramp to enter, no pref for transport provider.         Please notify [facility] upon discharge, [phone], they will provide fax number to send Patricia Arnaldo order & AVS.

## 2021-09-30 NOTE — PROGRESS NOTES
Carolina Cohn [facility], [address] is a 78 y.o. female on warfarin therapy for a-fib. Pharmacy consulted by Dr. Solomon Talbot  for monitoring and adjustment of treatment. Indication for anticoagulation: A-fib  INR goal: 2-3  Warfarin dose prior to admission: Alternating days with 1mg and 2mg daily, last dose on 09/28 1mg    Pertinent Laboratory Values   Recent Labs     09/29/21  1136   INR 1.98   HGB 14.0   HCT 44.6          Assessment/Plan:   Drug Interactions: tramadol, cefepime sarted in house   INR 1.98   Will continue warfarin Home dose  [address] will continue to monitor and adjust warfarin therapy as indicated   Daily INR ordered    Thank you for the consult.   Kb Schreiber, [facility], [address]  9/29/2021 9:00 PM

## 2021-09-30 NOTE — CONSULTS
Electrophysiology Consult Note      Reason for consultation: Atrial fibrillation    Chief complaint : Palpitations    Referring physician: Dr. Celestino Sahu      Primary care physician: Fifi Blue MD      History of Present Illness:     Patient is 27-year-old female with history of atrial fibrillation, Parkinson's disease, diabetes mellitus type 2, dementia, hypothyroidism presents with complaints of weakness increased confusion and irregular heart rate. Patient does not provide much information. Daughter and  are at the bedside. Daughter is providing all information. She states that her mother had not been feeling well or acting like her normal self for several days. She states that her mother had become more confused and appeared to be very weak. She states that she noticed her heart rate to be fast and irregular also. She states that her mother heart rate was high as high as 130 bpm    Patient was found to be in atrial fibrillation. Patient was given IV push Cardizem and heart rate became controlled. Patient was then started on Cardizem 60 mg every 6 hours    She additionally has UTI. Patient is not on rate control medications. She does take Coumadin. Patient denies any chest pain, palpitations, shortness of breath, lightheadedness, dizziness or syncope      Pastmedical history:   Past Medical History:   Diagnosis Date    Atrial fibrillation ([address] 01/27/2016    per holter monitor    Back pain     severe degeneration    Gallstone     H/O cardiovascular stress test 8/12/2014    cardiolite-normal, no ischemia, EF70%    H/O Doppler ultrasound 10/1/14    Carotid doppler. No hemodynamically significant stenosis bilaterally.  H/O echocardiogram 08/12/2014    EF >57%, mild mitral and tricuspid regurg, normal LV systolic but abnormal diastolic function, no pericardial effusion.     History of echocardiogram 12/04/2020    EF 50%, Mild septal asymmetric hypertrophy, Grade II DD, Mildly dilated left atrium, Normal pulmonary artery pressure, no pericardial effusion    History of Holter monitoring 1/27/2016    new onset afib    Hx of Carotid Doppler ultrasound 12/04/2020    Mild 0-49% disease of the bilateral ICA, Normal vertebral flow    Leg cramping     Recurrent urinary tract infection     Spinal stenosis of lumbar region     Vitamin B12 deficiency     Vitamin D deficiency        Surgical history : Denies any recent surgery    Family history:   Family History   Problem Relation Age of Onset    High Blood Pressure Sister     Heart Disease Brother        Social history :  reports that she has never smoked. She has never used smokeless tobacco. She reports that she does not drink alcohol and does not use drugs. Allergies   Allergen Reactions    Ciprofibrate     Ciprofloxacin Other (See Comments)     Anorexia      Pregabalin        No current facility-administered medications on file prior to encounter. Current Outpatient Medications on File Prior to Encounter   Medication Sig Dispense Refill    carbidopa-levodopa (SINEMET)  MG per tablet Take 2 tablets by mouth 3 times daily 09/29/21 Daughter reports this dosing is a trial basis. patient was receiving Stalevo -200 mg tablets TID. Per daughter patient appears to groggy with the 2 tables TID and started decreasing dosage.  folic acid (FOLVITE) 1 MG tablet TAKE ONE (1) TABLET BY MOUTH ONCE DAILY 90 tablet 1    metFORMIN (GLUCOPHAGE) 500 MG tablet 1 with evening meds.  (Patient taking differently: Take 500 mg by mouth Daily with supper 1 with evening meds.) 90 tablet 1    levothyroxine (SYNTHROID) 50 MCG tablet Take 1 tablet by mouth Daily 90 tablet 1    warfarin (COUMADIN) 2 MG tablet TAKE 1 TABLET BY MOUTH ONCE DAILY (Patient taking differently: Take by mouth See Admin Instructions 09/29/21 Patient alternates b/t 2 mg and 1 mg every other day, takes at HS last dose 09/28/21 1 mg) 90 tablet 1    amantadine (SYMMETREL) 100 MG capsule Take 1 capsule by mouth daily (Patient taking differently: Take 100 mg by mouth nightly ) 90 capsule 1    baclofen (LIORESAL) 10 MG tablet TAKE ONE-HALF (1/2) TABLET BY MOUTH SIX (6) TIMES A DAY AND ONE (1) TABLET BY MOUTH IN EVENING (Patient taking differently: Take 5 mg by mouth every 4 hours as needed Patient does not take this routinely very PRN) 300 tablet 0    vitamin B-12 (CYANOCOBALAMIN) 1000 MCG tablet Take 1,000 mcg by mouth every other day       Cholecalciferol (VITAMIN D) 2000 UNITS CAPS capsule Take 1 capsule by mouth.  Accu-Chek Softclix Lancets MISC USE 1 LANCET ONCE A  each 2    blood glucose test strips (ACCU-CHEK HIEN PLUS) strip TEST BLOOD SUGAR DAILY AS NEEDED 100 strip 3       Review of Systems:   Review of Systems   Constitutional: Positive for appetite change and fatigue. Negative for activity change, chills and fever. HENT: Negative for congestion, ear pain and tinnitus. Eyes: Negative for photophobia, pain and visual disturbance. Respiratory: Negative for cough, chest tightness, shortness of breath and wheezing. Cardiovascular: Negative for chest pain, palpitations and leg swelling. Gastrointestinal: Negative for abdominal pain, blood in stool, constipation, diarrhea, nausea and vomiting. Endocrine: Negative for cold intolerance and heat intolerance. Genitourinary: Negative for dysuria, flank pain and hematuria. Musculoskeletal: Positive for arthralgias and gait problem. Negative for back pain, myalgias and neck stiffness. Skin: Negative for color change and rash. Allergic/Immunologic: Negative for food allergies. Neurological: Positive for tremors. Negative for dizziness, light-headedness, numbness and headaches. Hematological: Does not bruise/bleed easily. Psychiatric/Behavioral: Positive for confusion. Negative for agitation and behavioral problems.        Examination: Vitals:    09/29/21 2026 09/30/21 0310 09/30/21 0900 09/30/21 1208   BP: (!) 121/91 101/63 (!) 128/58    Pulse: 70 72 75    Resp: 20 16 16    Temp: 98.2 °F (36.8 °C) 98.2 °F (36.8 °C) 97.9 °F (36.6 °C)    TempSrc: Oral Oral Oral    SpO2: 93% 96% 96% 95%   Weight:  180 lb 11.2 oz (82 kg)          Body mass index is 32.01 kg/m². Physical Exam  Constitutional:       General: She is not in acute distress. Appearance: She is well-developed. She is not ill-appearing. HENT:      Head: Normocephalic and atraumatic. Nose: No congestion. Mouth/Throat:      Mouth: Mucous membranes are moist.   Eyes:      Conjunctiva/sclera: Conjunctivae normal.   Neck:      Thyroid: No thyromegaly. Vascular: No JVD. Cardiovascular:      Rate and Rhythm: Normal rate. Rhythm irregular. Heart sounds: Murmur (grade 2/6 systolic murmur) heard. No friction rub. Pulmonary:      Effort: Pulmonary effort is normal. No respiratory distress. Breath sounds: Normal breath sounds. No stridor. No wheezing. Abdominal:      General: There is no distension. Palpations: Abdomen is soft. Tenderness: There is no abdominal tenderness. Musculoskeletal:         General: No swelling or tenderness. Cervical back: Normal range of motion. Skin:     General: Skin is warm and dry. Findings: No erythema. Neurological:      Mental Status: She is alert.       Comments: Has history of parkinsons disease               CBC:   Lab Results   Component Value Date    WBC 6.4 09/30/2021    HGB 11.9 09/30/2021    HCT 37.6 09/30/2021     09/30/2021     Lipids:No results found for: CHOL, TRIG, HDL, LDLCALC, LDLDIRECT  PT/INR:   Lab Results   Component Value Date    INR 2.31 09/30/2021        BMP:    Lab Results   Component Value Date     09/30/2021    K 3.9 09/30/2021     09/30/2021    CO2 21 09/30/2021    BUN 26 (H) 09/30/2021     CMP:   Lab Results   Component Value Date    AST 11 (L) 09/29/2021    PROT 6.6 09/29/2021    BILITOT 0.7 09/29/2021    ALKPHOS 71 09/29/2021     TSH:    Lab Results   Component Value Date    TSH 1.770 07/13/2018       EKGINTERPRETATION - EKG Interpretation:        IMPRESSION / RECOMMENDATIONS:     Atrial fibrillation with RVR  Urinary tract infection  Confusion associated with lethargy  Parkinson's disease  Diabetes  Hypothyroidism     Patient has Parkinson's disease and she needs assistance to move. Patient has urinary tract infection and that could have triggered the atrial fibrillation too. Patient has PAF episodes before. Infection could have triggered RVR episode too  Discussed all the options with patient and patient daughter. We could cardiovert to maintain the rhythm but with the infection going on she may not stay in rhythm. Options of antiarrhythmic discussed with the patient and there was some concerns of low blood pressure on the patient.     I think rate control is reasonable option in this patient was not symptomatic with atrial fibrillation currently  Not low-dose digoxin at 125 mcg daily as low blood pressures are a concern  She is on Cardizem 60 mg 3 times daily  She is also on Coumadin        Thanks again for allowing me to participate in care of this patient. Please call me if you have any questions. With best regards. Alix Mg MD, 9/30/2021 2:30 PM     Please note this report has been partially produced using speech recognition software and may contain errors related to that system including errors in grammar, punctuation, and spelling, as well as words and phrases that may be inappropriate. If there are any questions or concerns please feel free to contact the dictating provider for clarification.

## 2021-09-30 NOTE — CONSULTS
Neurology Service Consult Note  [facility]   Patient Name: Wai Gregorio [facility]  : 1940        Subjective:   Reason for consult: altered mental status and   78 y.o.  female presenting to [facility] yesterday, one day prior to my exam for irregular heart beat and generalized weakness/confusion. Patient is laying in bed upon exam, lethargic but able to arouse with verbal stimulation and oriented to time/place. Daughter is at bedside and able to provide most of the history. Patient has questionable history of parkinson's disease for which she follows with our group outpatient, last seen by Dr. Denise Ron 21. At that visit, he was trying to tease out how effective dopamine is regarding her symptoms as patient's daughter says it seems to make her very tired/\"knock her out\" for several hours prior to seeing improvement in movement/ambulation. He initiated her on sinemet 2 tablets of 25/100 mg three times daily. Daughter says they have tried this, have been playing with timing of the doses to see what works best and decreased mid day dosing to 1 tablet to see if this helped with the fatigue/letheargy. She tells me on the sinemet she can tell that her mother is able to move better but again after several hours. I had a very lengthy discussion with her regarding cost vs. Benefit of the medications we discussed that sinemet is more symptomatic treatment would not alter progression of parkinson's disease. She was concerned that increasing/changing dose could have exacerbated atrial fibrillation to which Dr. Josué Ashley agreed there was low suspicion for this. Additionally, on admission she was found to have UTI which could be contributing to lethargy, per office notes she has struggle with recurrent UTIs. On exam she was able to deny headache, complaining of dry mouth but appeared to be resting comfortably.      Patient with history of Vitamin B12 deficiency, Vitamin D deficiency, Atrial fibrillation. Past Medical History:   Diagnosis Date    Atrial fibrillation ([address] 01/27/2016    per holter monitor    Back pain     severe degeneration    Gallstone     H/O cardiovascular stress test 8/12/2014    cardiolite-normal, no ischemia, EF70%    H/O Doppler ultrasound 10/1/14    Carotid doppler. No hemodynamically significant stenosis bilaterally.  H/O echocardiogram 08/12/2014    EF >57%, mild mitral and tricuspid regurg, normal LV systolic but abnormal diastolic function, no pericardial effusion.  History of echocardiogram 12/04/2020    EF 50%, Mild septal asymmetric hypertrophy, Grade II DD, Mildly dilated left atrium, Normal pulmonary artery pressure, no pericardial effusion    History of Holter monitoring 1/27/2016    new onset afib    Hx of Carotid Doppler ultrasound 12/04/2020    Mild 0-49% disease of the bilateral ICA, Normal vertebral flow    Leg cramping     Recurrent urinary tract infection     Spinal stenosis of lumbar region     Vitamin B12 deficiency     Vitamin D deficiency     :   History reviewed. No pertinent surgical history.   Medications:  Scheduled Meds:   digoxin  125 mcg Oral Daily    [START ON 10/1/2021] carbidopa-levodopa  2 tablet Oral BID    [START ON 10/1/2021] carbidopa-levodopa  1 tablet Oral Once    amantadine  100 mg Oral Nightly    vitamin D  1 capsule Oral Daily    folic acid  1 mg Oral Daily    levothyroxine  50 mcg Oral Daily    metFORMIN  500 mg Oral Dinner    [START ON 10/1/2021] vitamin B-12  1,000 mcg Oral Every Other Day    sodium chloride flush  10 mL IntraVENous 2 times per day    insulin lispro  0-6 Units SubCUTAneous TID WC    insulin lispro  0-3 Units SubCUTAneous Nightly    famotidine  20 mg Oral BID    cefepime  2,000 mg IntraVENous Q12H    dilTIAZem  60 mg Oral 3 times per day    warfarin  2 mg Oral Every Other Day    warfarin  1 mg Oral Every Other Day     Continuous Infusions:   sodium chloride 75 mL/hr at 09/30/21 1758    sodium chloride      dextrose       PRN Meds:.sodium chloride flush, sodium chloride, potassium chloride, magnesium sulfate, ondansetron **OR** ondansetron, polyethylene glycol, acetaminophen **OR** acetaminophen, traMADol, glucose, dextrose, glucagon (rDNA), dextrose, baclofen    Allergies   Allergen Reactions    Ciprofibrate     Ciprofloxacin Other (See Comments)     Anorexia      Pregabalin      Social History     Socioeconomic History    Marital status:      Spouse name: Not on file    Number of children: Not on file    Years of education: Not on file    Highest education level: Not on file   Occupational History    Not on file   Tobacco Use    Smoking status: Never Smoker    Smokeless tobacco: Never Used   Vaping Use    Vaping Use: Never used   Substance and Sexual Activity    Alcohol use: No     Comment: caffeine 1 pop a day    Drug use: No    Sexual activity: Not on file   Other Topics Concern    Not on file   Social History Narrative    Not on file     Social Determinants of Health     Financial Resource Strain: Low Risk     Difficulty of Paying Living Expenses: Not hard at all   Food Insecurity: No Food Insecurity    Worried About Running Out of Food in the Last Year: Never true    Melanie of Food in the Last Year: Never true   Transportation Needs:     Lack of Transportation (Medical):      Lack of Transportation (Non-Medical):    Physical Activity:     Days of Exercise per Week:     Minutes of Exercise per Session:    Stress:     Feeling of Stress :    Social Connections:     Frequency of Communication with Friends and Family:     Frequency of Social Gatherings with Friends and Family:     Attends [employer] Services:     Active Member of Clubs or Organizations:     Attends Club or Organization Meetings:     Marital Status:    Intimate Partner Violence:     Fear of Current or Ex-Partner:     Emotionally Abused:     Physically Abused:     Sexually Abused:       Family History   Problem Relation Age of Onset    High Blood Pressure Sister     Heart Disease Brother        Review of Symptoms:    10-point system review completed. All of which are negative except as mentioned above. Physical Exam:       Gen: Would arouse to verbal stimulation, oriented to self, place, time  HEENT: NC/AT, EOMI, PERRL, mmm, neck supple, no meningeal signs  Heart: tachycardic per monitor  Lungs: No respiratory distress  Ext: no edema, no calf tenderness b/l  Psych: normal mood and affect  Skin: no rashes or lesions    NEUROLOGIC EXAM:    Mental Status: Would arouse to verbal stimulation, oriented to to self, location, month and year, NAD, speech clear, language slowed, repetition and naming intact, follows commands appropriately    Cranial Nerve Exam:   CN II-XII: PERRL, VFF, no nystagmus, no gaze paresis, sensation V1-V3 intact b/l, muscles of facial expression symmetric; hearing intact to conversational tone, palate elevates symmetrically, shoulder elevation symmetric and tongue protrudes midline with movement side to side. Motor Exam:       Movement antigravity in all extremities, I do appreciate some rigidity in all extremities    Deep Tendon Reflexes: 2/4 biceps, triceps, brachioradialis, patellar, and achilles b/l; flexor plantar responses b/l    Sensation: Intact light touch/vibration UE's/LE's b/l    Coordination/Cerebellum:       Tremors--none    Gait and stance:      Gait: deferred      LABS:     Recent Labs     09/29/21  1136 09/30/21  0622   WBC 9.0 6.4    143   K 4.1 3.9    110   CO2 21 21   BUN 30* 26*   CREATININE 0.8 1.0   GLUCOSE 146* 112*   INR 1.98 2.31          IMAGING:      CT Head  1. No acute intracranial abnormality.           ASSESSMENT/PLAN:   77 yo female with history of Atrial fibrillation, vitamin D deficiency, Vitamin B12 deficiency, questionable parkinson's disease vs. Parkinsonism presents with irregular heart rate secondary to atrial

## 2021-10-01 LAB
ANION GAP SERPL CALCULATED.3IONS-SCNC: 11 MMOL/L (ref 4–16)
BUN BLDV-MCNC: 17 MG/DL (ref 6–23)
CALCIUM SERPL-MCNC: 8.2 MG/DL (ref 8.3–10.6)
CHLORIDE BLD-SCNC: 110 MMOL/L (ref 99–110)
CO2: 18 MMOL/L (ref 21–32)
CREAT SERPL-MCNC: 0.8 MG/DL (ref 0.6–1.1)
CULTURE: ABNORMAL
CULTURE: ABNORMAL
GFR AFRICAN AMERICAN: >60 ML/MIN/1.73M2
GFR NON-AFRICAN AMERICAN: >60 ML/MIN/1.73M2
GLUCOSE BLD-MCNC: 107 MG/DL (ref 70–99)
GLUCOSE BLD-MCNC: 111 MG/DL (ref 70–99)
GLUCOSE BLD-MCNC: 119 MG/DL (ref 70–99)
GLUCOSE BLD-MCNC: 124 MG/DL (ref 70–99)
GLUCOSE BLD-MCNC: 135 MG/DL (ref 70–99)
INR BLD: 2.59 INDEX
Lab: ABNORMAL
MAGNESIUM: 1.6 MG/DL (ref 1.8–2.4)
PHOSPHORUS: 2.8 MG/DL (ref 2.5–4.9)
POTASSIUM SERPL-SCNC: 3.7 MMOL/L (ref 3.5–5.1)
PROTHROMBIN TIME: 33.7 SECONDS (ref 11.7–14.5)
SODIUM BLD-SCNC: 139 MMOL/L (ref 135–145)
SPECIMEN: ABNORMAL

## 2021-10-01 PROCEDURE — 36415 COLL VENOUS BLD VENIPUNCTURE: CPT

## 2021-10-01 PROCEDURE — 80048 BASIC METABOLIC PNL TOTAL CA: CPT

## 2021-10-01 PROCEDURE — 6360000002 HC RX W HCPCS: Performed by: INTERNAL MEDICINE

## 2021-10-01 PROCEDURE — 2580000003 HC RX 258: Performed by: INTERNAL MEDICINE

## 2021-10-01 PROCEDURE — 6370000000 HC RX 637 (ALT 250 FOR IP): Performed by: NURSE PRACTITIONER

## 2021-10-01 PROCEDURE — 94640 AIRWAY INHALATION TREATMENT: CPT

## 2021-10-01 PROCEDURE — 6360000002 HC RX W HCPCS: Performed by: NURSE PRACTITIONER

## 2021-10-01 PROCEDURE — 99232 SBSQ HOSP IP/OBS MODERATE 35: CPT | Performed by: INTERNAL MEDICINE

## 2021-10-01 PROCEDURE — 94761 N-INVAS EAR/PLS OXIMETRY MLT: CPT

## 2021-10-01 PROCEDURE — 99232 SBSQ HOSP IP/OBS MODERATE 35: CPT | Performed by: PHYSICIAN ASSISTANT

## 2021-10-01 PROCEDURE — 84100 ASSAY OF PHOSPHORUS: CPT

## 2021-10-01 PROCEDURE — 82962 GLUCOSE BLOOD TEST: CPT

## 2021-10-01 PROCEDURE — 83735 ASSAY OF MAGNESIUM: CPT

## 2021-10-01 PROCEDURE — 1200000000 HC SEMI PRIVATE

## 2021-10-01 PROCEDURE — APPSS30 APP SPLIT SHARED TIME 16-30 MINUTES: Performed by: NURSE PRACTITIONER

## 2021-10-01 PROCEDURE — 6370000000 HC RX 637 (ALT 250 FOR IP): Performed by: PHYSICIAN ASSISTANT

## 2021-10-01 PROCEDURE — 85610 PROTHROMBIN TIME: CPT

## 2021-10-01 PROCEDURE — 6370000000 HC RX 637 (ALT 250 FOR IP): Performed by: INTERNAL MEDICINE

## 2021-10-01 RX ORDER — ALBUTEROL SULFATE 90 UG/1
2 AEROSOL, METERED RESPIRATORY (INHALATION) EVERY 6 HOURS PRN
Status: DISCONTINUED | OUTPATIENT
Start: 2021-10-01 | End: 2021-10-13 | Stop reason: HOSPADM

## 2021-10-01 RX ORDER — MAGNESIUM SULFATE IN WATER 40 MG/ML
2000 INJECTION, SOLUTION INTRAVENOUS ONCE
Status: COMPLETED | OUTPATIENT
Start: 2021-10-01 | End: 2021-10-01

## 2021-10-01 RX ADMIN — ALBUTEROL SULFATE 2 PUFF: 90 AEROSOL, METERED RESPIRATORY (INHALATION) at 05:18

## 2021-10-01 RX ADMIN — ACETAMINOPHEN 650 MG: 325 TABLET ORAL at 14:09

## 2021-10-01 RX ADMIN — WARFARIN SODIUM 2 MG: 2 TABLET ORAL at 17:48

## 2021-10-01 RX ADMIN — CEFEPIME 2000 MG: 2 INJECTION, POWDER, FOR SOLUTION INTRAVENOUS at 06:23

## 2021-10-01 RX ADMIN — BACLOFEN 5 MG: 10 TABLET ORAL at 17:58

## 2021-10-01 RX ADMIN — DILTIAZEM HYDROCHLORIDE 60 MG: 60 TABLET, FILM COATED ORAL at 14:09

## 2021-10-01 RX ADMIN — SODIUM CHLORIDE: 9 INJECTION, SOLUTION INTRAVENOUS at 06:22

## 2021-10-01 RX ADMIN — LEVOTHYROXINE SODIUM 50 MCG: 0.05 TABLET ORAL at 06:16

## 2021-10-01 RX ADMIN — MAGNESIUM SULFATE HEPTAHYDRATE 2000 MG: 40 INJECTION, SOLUTION INTRAVENOUS at 14:18

## 2021-10-01 RX ADMIN — DILTIAZEM HYDROCHLORIDE 60 MG: 60 TABLET, FILM COATED ORAL at 21:02

## 2021-10-01 RX ADMIN — FOLIC ACID 1 MG: 1 TABLET ORAL at 11:04

## 2021-10-01 RX ADMIN — FAMOTIDINE 20 MG: 20 TABLET ORAL at 11:04

## 2021-10-01 RX ADMIN — Medication 1000 UNITS: at 11:04

## 2021-10-01 RX ADMIN — SODIUM CHLORIDE: 9 INJECTION, SOLUTION INTRAVENOUS at 03:37

## 2021-10-01 RX ADMIN — CYANOCOBALAMIN TAB 1000 MCG 1000 MCG: 1000 TAB at 11:04

## 2021-10-01 RX ADMIN — DIGOXIN 125 MCG: 125 TABLET ORAL at 11:04

## 2021-10-01 RX ADMIN — BACLOFEN 5 MG: 10 TABLET ORAL at 01:29

## 2021-10-01 RX ADMIN — DILTIAZEM HYDROCHLORIDE 60 MG: 60 TABLET, FILM COATED ORAL at 06:16

## 2021-10-01 RX ADMIN — BACLOFEN 5 MG: 10 TABLET ORAL at 06:16

## 2021-10-01 RX ADMIN — CARBIDOPA AND LEVODOPA 2 TABLET: 25; 100 TABLET ORAL at 17:57

## 2021-10-01 RX ADMIN — CEFEPIME 2000 MG: 2 INJECTION, POWDER, FOR SOLUTION INTRAVENOUS at 17:50

## 2021-10-01 RX ADMIN — CARBIDOPA AND LEVODOPA 1 TABLET: 25; 100 TABLET ORAL at 11:06

## 2021-10-01 RX ADMIN — METFORMIN HYDROCHLORIDE 500 MG: 500 TABLET ORAL at 17:48

## 2021-10-01 RX ADMIN — AMANTADINE HYDROCHLORIDE 100 MG: 100 CAPSULE, LIQUID FILLED ORAL at 21:02

## 2021-10-01 RX ADMIN — FAMOTIDINE 20 MG: 20 TABLET ORAL at 21:02

## 2021-10-01 RX ADMIN — SODIUM CHLORIDE, PRESERVATIVE FREE 10 ML: 5 INJECTION INTRAVENOUS at 21:03

## 2021-10-01 ASSESSMENT — PAIN SCALES - GENERAL
PAINLEVEL_OUTOF10: 5
PAINLEVEL_OUTOF10: 4

## 2021-10-01 NOTE — PROGRESS NOTES
Hospitalist Progress Note      Name:  Itzel [facility] /Age/Sex: 1940  (78 y.o. female)   MRN & CSN:  [id number] & [id number] Admission Date/Time: 2021 11:26 AM   Location:  [address] [place] [address] PCP: iJm Cole MD         Hospital Day: 3      Assessment and Plan:     Patient is a 28-year-old female past medical history of Parkinson's disease, recent increase/change in Parkinson's medication who was admitted for A. fib rapid ventricular response, confusion and found to have urinary tract infection. Patient urine culture came back positive for Klebsiella. Patient remains confused. Patient daughter was concerned about abnormal extension of a tissue from her genitalia and wanted OB/GYN to see patient today. OB/GYN consulted. 1. Paroxysmal atrial fibrillation with rapid ventricular response: Rate controlled  2. Urinary tract infection with urine culture being positive for Klebsiella  3. Metabolic encephalopathy  4. History of Parkinson's disease  5. Other medical problem include diabetes, hypothyroidism  6. Abnormal skin extension from genitalia     Plan:  Patient is drowsy but easily arousable during evaluation today. Continue Cardizem and Coumadin per cardiology for A. fib: Currently rate controlled  Culture came back positive for Klebsiella: Continue current antibiotics  Continue other medication for chronic medical problems  Patient's Parkinson disease were adjusted by neurology. OB/GYN consulted for an abnormal skin growth from patient's genitalia  Neurology following  PT/OT           DVT prophylaxis: Lovenox   : Total support CODE STATUS      Subjective. :     Patient was seen and examined today. No acute events overnight. Daughter at bedside states she was unable to sleep at night. Daughter was concerned about an abnormal extension of skin/tissue from the patient's genitalia and wanted OB/GYN to see patients.   Objective:   Vitals:   Vitals:    10/01/21 0725   BP: 131/72   Pulse: 71   Resp: 22   Temp: 96.4 °F (35.8 °C)   SpO2: 95%         Physical Exam:   GEN: Awake, alert, in no apparent distress awake female, sitting upright in bed in no apparent distress. Appears given age. HEENT: Atraumatic, normocephalic, PERRLA, EOMI  NECK: Supple, no apparent thyromegaly or masses. RESP: Clear lungs to auscultation  CARDIO/VASC: Regular rate and rhythm, normal S1, S2, no murmurs  GI: Abdomen is soft, nontender, no significant organomegaly noted, bowel sounds present  MSK: No gross joint deformities.   Skin: No significant rashes, skin lesions noted  Neuro: AOx2, cranial nerves grossly intact, no focal motor or sensory deficits   PSYCH: Affect appropriate    Medications:   Medications:    digoxin  125 mcg Oral Daily    carbidopa-levodopa  2 tablet Oral BID    carbidopa-levodopa  1 tablet Oral Once    amantadine  100 mg Oral Nightly    vitamin D  1 capsule Oral Daily    folic acid  1 mg Oral Daily    levothyroxine  50 mcg Oral Daily    metFORMIN  500 mg Oral Dinner    vitamin B-12  1,000 mcg Oral Every Other Day    sodium chloride flush  10 mL IntraVENous 2 times per day    insulin lispro  0-6 Units SubCUTAneous TID WC    insulin lispro  0-3 Units SubCUTAneous Nightly    famotidine  20 mg Oral BID    cefepime  2,000 mg IntraVENous Q12H    dilTIAZem  60 mg Oral 3 times per day    warfarin  2 mg Oral Every Other Day    warfarin  1 mg Oral Every Other Day      Infusions:    sodium chloride 75 mL/hr at 10/01/21 0622    sodium chloride      dextrose       PRN Meds: albuterol sulfate HFA, 2 puff, Q6H PRN  sodium chloride flush, 10 mL, PRN  sodium chloride, 25 mL, PRN  potassium chloride, 10 mEq, PRN  magnesium sulfate, 1,000 mg, PRN  ondansetron, 4 mg, Q8H PRN   Or  ondansetron, 4 mg, Q6H PRN  polyethylene glycol, 17 g, Daily PRN  acetaminophen, 650 mg, Q6H PRN   Or  acetaminophen, 650 mg, Q6H PRN  traMADol, 50 mg, Q6H PRN  glucose, 15 g, PRN  dextrose, 12.5 g, PRN  glucagon (rDNA), 1 mg, PRN  dextrose, 100 mL/hr, PRN  baclofen, 5 mg, Q4H PRN          Electronically signed by Adelina Walsh MD on 10/1/2021 at 9:57 AM

## 2021-10-01 NOTE — PROGRESS NOTES
Neurology Service Consult Note  [facility]   Patient Name: Wai Gregorio [facility]  : 1940        Subjective:   Reason for consult: altered mental status     Patient was seen and examined this morning. Chart reviewed. Daughter was at bedside and informed me that she had not received her dosing of Sinemet last night or this morning and was noting that she does not appear as rigid. We discussed that will always have to weight cost vs. Benefit and from what she is telling me would recommend continuing to titrate down on sinemet but would do so cautiously now as there are other metabolic changes occurring with UTI. She verbalized understanding and agreement. Past Medical History:   Diagnosis Date    Atrial fibrillation ([address] 2016    per holter monitor    Back pain     severe degeneration    Gallstone     H/O cardiovascular stress test 2014    cardiolite-normal, no ischemia, EF70%    H/O Doppler ultrasound 10/1/14    Carotid doppler. No hemodynamically significant stenosis bilaterally.  H/O echocardiogram 2014    EF >57%, mild mitral and tricuspid regurg, normal LV systolic but abnormal diastolic function, no pericardial effusion.  History of echocardiogram 2020    EF 50%, Mild septal asymmetric hypertrophy, Grade II DD, Mildly dilated left atrium, Normal pulmonary artery pressure, no pericardial effusion    History of Holter monitoring 2016    new onset afib    Hx of Carotid Doppler ultrasound 2020    Mild 0-49% disease of the bilateral ICA, Normal vertebral flow    Leg cramping     Recurrent urinary tract infection     Spinal stenosis of lumbar region     Vitamin B12 deficiency     Vitamin D deficiency     :   History reviewed. No pertinent surgical history.   Medications:  Scheduled Meds:   carbidopa-levodopa  1 tablet Oral Daily    digoxin  125 mcg Oral Daily    carbidopa-levodopa  2 tablet Oral BID    amantadine  100 mg Oral Nightly    vitamin D  1 capsule Oral Daily    folic acid  1 mg Oral Daily    levothyroxine  50 mcg Oral Daily    metFORMIN  500 mg Oral Dinner    vitamin B-12  1,000 mcg Oral Every Other Day    sodium chloride flush  10 mL IntraVENous 2 times per day    insulin lispro  0-6 Units SubCUTAneous TID WC    insulin lispro  0-3 Units SubCUTAneous Nightly    famotidine  20 mg Oral BID    cefepime  2,000 mg IntraVENous Q12H    dilTIAZem  60 mg Oral 3 times per day    warfarin  2 mg Oral Every Other Day    warfarin  1 mg Oral Every Other Day     Continuous Infusions:   sodium chloride 75 mL/hr at 10/01/21 0622    sodium chloride      dextrose       PRN Meds:.albuterol sulfate HFA, sodium chloride flush, sodium chloride, potassium chloride, magnesium sulfate, ondansetron **OR** ondansetron, polyethylene glycol, acetaminophen **OR** acetaminophen, traMADol, glucose, dextrose, glucagon (rDNA), dextrose, baclofen    Allergies   Allergen Reactions    Ciprofibrate     Ciprofloxacin Other (See Comments)     Anorexia      Pregabalin      Social History     Socioeconomic History    Marital status:      Spouse name: Not on file    Number of children: Not on file    Years of education: Not on file    Highest education level: Not on file   Occupational History    Not on file   Tobacco Use    Smoking status: Never Smoker    Smokeless tobacco: Never Used   Vaping Use    Vaping Use: Never used   Substance and Sexual Activity    Alcohol use: No     Comment: caffeine 1 pop a day    Drug use: No    Sexual activity: Not on file   Other Topics Concern    Not on file   Social History Narrative    Not on file     Social Determinants of Health     Financial Resource Strain: Low Risk     Difficulty of Paying Living Expenses: Not hard at all   Food Insecurity: No Food Insecurity    Worried About Running Out of Food in the Last Year: Never true    Melanie of Food in the Last Year: Never true   Transportation b/l    Sensation: Intact light touch/vibration UE's/LE's b/l    Coordination/Cerebellum:       Tremors--none    Gait and stance:      Gait: deferred      LABS:     Recent Labs     09/29/21  1136 09/30/21  0622 10/01/21  0718   WBC 9.0 6.4  --     143 139   K 4.1 3.9 3.7    110 110   CO2 21 21 18*   BUN 30* 26* 17   CREATININE 0.8 1.0 0.8   GLUCOSE 146* 112* 135*   INR 1.98 2.31 2.59          IMAGING:      CT Head  1. No acute intracranial abnormality.           ASSESSMENT/PLAN:   77 yo female with history of Atrial fibrillation, vitamin D deficiency, Vitamin B12 deficiency, questionable parkinson's disease vs. Parkinsonism presents with irregular heart rate secondary to atrial fibrillation. Neurology consulted for increased confusion and lethargy secondary to encephalopathy superimposed on UTI and possible medication side effect    1. Increased confusion and lethargy secondary to encephalopathy superimposed on UTI and possible medication side effect  · CT head: see above  · Will decrease dosing down to 25/100 mg 2 tablets three times daily to 2 tablets in the morning, 1 tablet mid day and 2 tablets at night  · Management of antibiotics for UTI per medicine team  · PT/OT/ST per their recommendations  · No further inpatient work up; will follow peripherally  · Patient established with our office, recommend outpatient follow up to further titrate down on sinemet       Patient care discussed with attending physician, Dr. Eduardo Ilgesias. Thank you for allowing us to participate in the care of your patient. If there are any questions regarding evaluation please feel free to contact us.      Neema Butler, [id number], 10/1/2021

## 2021-10-01 NOTE — PROGRESS NOTES
Physician Progress Note      Christina Dolan  CSN #:                  [id number]  :                       1940  ADMIT DATE:       2021 11:26 AM  DISCH DATE:  RESPONDING  PROVIDER #:        Marnie Braun          QUERY TEXT:    Pt admitted with UTI. Pt noted to have confusion. If possible, please document   in the progress notes and discharge summary if you are evaluating and / or   treating any of the following: The medical record reflects the following:  Risk Factors: UTI, recent med change  Clinical Indicators: Pt presented to ED with c/o increased confusion and   somnolence per daughter. She had a recent increase in her Parkinson's   medication. Pt UA is positive for nitrites, blood, large leuks, many bacteria,   and mucus. The urine culture is still pending. Documentation reflects pt is   alert to self and usually place but not time. Daughter reports pt to be   \"talking out of her head. \" Most recent progress note on  indicates,   \"Unsure if her lethargy/confusion is related to recent increase in a change in   partners medication. \"  Treatment: Cefepime IV with culture and sensitivity pending    Thank you,  Christo Saldivar, RN  [phone]  Options provided:  -- Drug-induced encephalopathy due to, Please specify substance. -- Drug-induced encephalopathy, substance(s) unknown  -- Metabolic encephalopathy  -- Delirium due to, Please specify cause. -- Delirium  -- Other - I will add my own diagnosis  -- Disagree - Not applicable / Not valid  -- Disagree - Clinically unable to determine / Unknown  -- Refer to Clinical Documentation Reviewer    PROVIDER RESPONSE TEXT:    This patient has metabolic encephalopathy.     Query created by: Miguelina Matta on 2021 10:12 AM      Electronically signed by:  Marnie Braun 10/1/2021 9:52 AM

## 2021-10-01 NOTE — PROGRESS NOTES
Cardiology Progress Note     Today's Plan CPM-  Will sign off and be available if needed    Admit Date:  9/29/2021    Consult reason/ Seen today for: atrial fib with RVR    Subjective and  Overnight Events:  Sleeping during exam- daughter at bedside states mom has been confused- she did not sleep much last pm    Telemetry atrial fib  Rate is controlled      Assessment / Plan / Recommendation:   1. Atrial fib- EP consulted appreciate input- rate is better controlled- goal is for rate control- continue with anticoagulation with warfarin- lanoxin and Cardizem for rate control    2. UTI- may have triggered atrial fib-per primary   3. VHD- moderate MR -stable - will follow          History of Presenting Illness:    Chief complain on admission : 78 y. o.year old who is admitted for  Chief Complaint   Patient presents with    Irregular Heart Beat     A.fib RVR        Past medical history:    has a past medical history of Atrial fibrillation ([address] Back pain, Gallstone, H/O cardiovascular stress test, H/O Doppler ultrasound, H/O echocardiogram, History of echocardiogram, History of Holter monitoring, Hx of Carotid Doppler ultrasound, Leg cramping, Recurrent urinary tract infection, Spinal stenosis of lumbar region, Vitamin B12 deficiency, and Vitamin D deficiency. Past surgical history:   has no past surgical history on file. Social History:   reports that she has never smoked. She has never used smokeless tobacco. She reports that she does not drink alcohol and does not use drugs. Family history:  family history includes Heart Disease in her brother; High Blood Pressure in her sister.     Allergies   Allergen Reactions    Ciprofibrate     Ciprofloxacin Other (See Comments)     Anorexia      Pregabalin        Review of Systems:  Review of Systems   Unable to perform ROS: Mental status change           /72   Pulse 71   Temp 96.4 °F (35.8 °C) (Oral)   Resp 22   Wt 184 lb (83.5 kg)   SpO2 95%   BMI 32.59 kg/m²   No intake or output data in the 24 hours ending 10/01/21 1357    Physical Exam:  Physical Exam  Vitals reviewed. Constitutional:       Appearance: She is obese. Cardiovascular:      Rate and Rhythm: Normal rate. Rhythm irregular. Heart sounds: Murmur heard. Pulmonary:      Effort: Pulmonary effort is normal.      Breath sounds: Normal breath sounds. Musculoskeletal:      Left lower leg: No edema. Skin:     Capillary Refill: Capillary refill takes less than 2 seconds. Neurological:      Mental Status: She is disoriented.           Medications:    carbidopa-levodopa  1 tablet Oral Daily    magnesium sulfate  2,000 mg IntraVENous Once    digoxin  125 mcg Oral Daily    carbidopa-levodopa  2 tablet Oral BID    amantadine  100 mg Oral Nightly    vitamin D  1 capsule Oral Daily    folic acid  1 mg Oral Daily    levothyroxine  50 mcg Oral Daily    metFORMIN  500 mg Oral Dinner    vitamin B-12  1,000 mcg Oral Every Other Day    sodium chloride flush  10 mL IntraVENous 2 times per day    insulin lispro  0-6 Units SubCUTAneous TID WC    insulin lispro  0-3 Units SubCUTAneous Nightly    famotidine  20 mg Oral BID    cefepime  2,000 mg IntraVENous Q12H    dilTIAZem  60 mg Oral 3 times per day    warfarin  2 mg Oral Every Other Day    warfarin  1 mg Oral Every Other Day      sodium chloride 75 mL/hr at 10/01/21 0622    sodium chloride      dextrose       albuterol sulfate HFA, sodium chloride flush, sodium chloride, potassium chloride, magnesium sulfate, ondansetron **OR** ondansetron, polyethylene glycol, acetaminophen **OR** acetaminophen, traMADol, glucose, dextrose, glucagon (rDNA), dextrose, baclofen    Lab Data:  CBC:   Recent Labs     09/29/21  1136 09/30/21  0622   WBC 9.0 6.4   HGB 14.0 11.9*   HCT 44.6 37.6   .7* 103.9*    237     BMP:   Recent Labs     09/29/21  1136 09/30/21  0622 09/30/21  0947 10/01/21  0718    143  --  139   K 4.1 3.9  --  3.7    110  --  110   CO2 21 21  --  18*   PHOS  --   --  3.0 2.8   BUN 30* 26*  --  17   CREATININE 0.8 1.0  --  0.8     PT/INR:   Recent Labs     09/29/21  1136 09/30/21  0622 10/01/21  0718   PROTIME 25.7* 30.1* 33.7*   INR 1.98 2.31 2.59     BNP:    Recent Labs     09/29/21  1136   PROBNP 3,846*     TROPONIN:   Recent Labs     09/29/21  1136   TROPONINT 0.013*              Impression:  Active Problems:    * No active hospital problems. *  Resolved Problems:    * No resolved hospital problems. *       All labs, medications and tests reviewed by myself, continue all other medications of all above medical condition listed as is except for changes mentioned above. Thank you   Please call with questions. Electronically signed by BRENNEN Long CNP on 10/1/2021 at 1:57 PM  I have seen ,spoken to  and examined this patient personally, independently of the nurse practitioner. I have reviewed the hospital care given to date and reviewed all pertinent labs and imaging. The plan was developed mutually at the time of the visit with the patient,  NP  and myself. I have spoken with patient, nursing staff and provided written and verbal instructions . The above note has been reviewed and I agree with the assessment, diagnosis, and treatment plan with changes made by me as follows     CARDIOLOGY ATTENDING ADDENDUM    HPI:  I have reviewed the above HPI  And agree with above   Jack Flores is a 78 y. o.year old who and presents with had concerns including Irregular Heart Beat (A.fib RVR).   Chief Complaint   Patient presents with    Irregular Heart Beat     A.fib RVR     Interval history:  No cp    Physical Exam:  General:  Awake, alert, NAD  Head:normal  Eye:normal  Neck:  No JVD   Chest:  Clear to auscultation, respiration easy  Cardiovascular:  RRR S1S2  Abdomen:   nontender  Extremities:  tr edema  Pulses; palpable  Neuro: grossly normal      MEDICAL DECISION MAKING;    I agree with the above plan, which was planned by myself and discussed with NP.   Stable cardiac status  Will FU as OP      Leticia Starr MD [facility]

## 2021-10-01 NOTE — PROGRESS NOTES
[address] is a 78 y.o. female on warfarin therapy for Afib. Pharmacy consulted by Dr. Rafaela Ohara for monitoring and adjustment of treatment. Indication for anticoagulation: Afib  INR goal: 2-3  Warfarin dose prior to admission: 2mg,1mg alternating days     Pertinent Laboratory Values   Recent Labs     09/29/21  1136 09/29/21  1136 09/30/21  0622 09/30/21  0622 10/01/21  0718   INR 1.98   < > 2.31   < > 2.59   HGB 14.0   < > 11.9*  --   --    HCT 44.6   < > 37.6  --   --      --  237  --   --     < > = values in this interval not displayed. Assessment/Plan:   Drug Interactions: None at this time    INR 2.59   Will continue home warfarin dose    Pharmacy will continue to monitor and adjust warfarin therapy as indicated    Thank you for the consult.   Da Bunch, [address] [place] [address]  10/1/2021 12:16 PM

## 2021-10-02 LAB
ANION GAP SERPL CALCULATED.3IONS-SCNC: 14 MMOL/L (ref 4–16)
BASOPHILS ABSOLUTE: 0.1 K/CU MM
BASOPHILS RELATIVE PERCENT: 0.9 % (ref 0–1)
BUN BLDV-MCNC: 13 MG/DL (ref 6–23)
CALCIUM SERPL-MCNC: 8.3 MG/DL (ref 8.3–10.6)
CHLORIDE BLD-SCNC: 111 MMOL/L (ref 99–110)
CO2: 17 MMOL/L (ref 21–32)
CREAT SERPL-MCNC: 0.8 MG/DL (ref 0.6–1.1)
DIFFERENTIAL TYPE: ABNORMAL
EOSINOPHILS ABSOLUTE: 0.1 K/CU MM
EOSINOPHILS RELATIVE PERCENT: 0.8 % (ref 0–3)
GFR AFRICAN AMERICAN: >60 ML/MIN/1.73M2
GFR NON-AFRICAN AMERICAN: >60 ML/MIN/1.73M2
GLUCOSE BLD-MCNC: 127 MG/DL (ref 70–99)
GLUCOSE BLD-MCNC: 130 MG/DL (ref 70–99)
GLUCOSE BLD-MCNC: 141 MG/DL (ref 70–99)
GLUCOSE BLD-MCNC: 155 MG/DL (ref 70–99)
GLUCOSE BLD-MCNC: 157 MG/DL (ref 70–99)
HCT VFR BLD CALC: 41.6 % (ref 37–47)
HEMOGLOBIN: 13.3 GM/DL (ref 12.5–16)
IMMATURE NEUTROPHIL %: 0.5 % (ref 0–0.43)
INR BLD: 2.66 INDEX
LYMPHOCYTES ABSOLUTE: 0.9 K/CU MM
LYMPHOCYTES RELATIVE PERCENT: 9.3 % (ref 24–44)
MAGNESIUM: 2 MG/DL (ref 1.8–2.4)
MCH RBC QN AUTO: 33.7 PG (ref 27–31)
MCHC RBC AUTO-ENTMCNC: 32 % (ref 32–36)
MCV RBC AUTO: 105.3 FL (ref 78–100)
MONOCYTES ABSOLUTE: 1 K/CU MM
MONOCYTES RELATIVE PERCENT: 10.3 % (ref 0–4)
NUCLEATED RBC %: 0 %
PDW BLD-RTO: 12.5 % (ref 11.7–14.9)
PLATELET # BLD: 223 K/CU MM (ref 140–440)
PMV BLD AUTO: 10 FL (ref 7.5–11.1)
POTASSIUM SERPL-SCNC: 3.9 MMOL/L (ref 3.5–5.1)
PROTHROMBIN TIME: 34.7 SECONDS (ref 11.7–14.5)
RBC # BLD: 3.95 M/CU MM (ref 4.2–5.4)
SEGMENTED NEUTROPHILS ABSOLUTE COUNT: 7.9 K/CU MM
SEGMENTED NEUTROPHILS RELATIVE PERCENT: 78.2 % (ref 36–66)
SODIUM BLD-SCNC: 142 MMOL/L (ref 135–145)
TOTAL IMMATURE NEUTOROPHIL: 0.05 K/CU MM
TOTAL NUCLEATED RBC: 0 K/CU MM
WBC # BLD: 10.1 K/CU MM (ref 4–10.5)

## 2021-10-02 PROCEDURE — 97530 THERAPEUTIC ACTIVITIES: CPT

## 2021-10-02 PROCEDURE — 99232 SBSQ HOSP IP/OBS MODERATE 35: CPT | Performed by: CLINICAL NURSE SPECIALIST

## 2021-10-02 PROCEDURE — 6370000000 HC RX 637 (ALT 250 FOR IP): Performed by: NURSE PRACTITIONER

## 2021-10-02 PROCEDURE — 97163 PT EVAL HIGH COMPLEX 45 MIN: CPT

## 2021-10-02 PROCEDURE — 83735 ASSAY OF MAGNESIUM: CPT

## 2021-10-02 PROCEDURE — 2580000003 HC RX 258: Performed by: INTERNAL MEDICINE

## 2021-10-02 PROCEDURE — 36415 COLL VENOUS BLD VENIPUNCTURE: CPT

## 2021-10-02 PROCEDURE — 6370000000 HC RX 637 (ALT 250 FOR IP): Performed by: FAMILY MEDICINE

## 2021-10-02 PROCEDURE — 82962 GLUCOSE BLOOD TEST: CPT

## 2021-10-02 PROCEDURE — 1200000000 HC SEMI PRIVATE

## 2021-10-02 PROCEDURE — 85610 PROTHROMBIN TIME: CPT

## 2021-10-02 PROCEDURE — 97167 OT EVAL HIGH COMPLEX 60 MIN: CPT

## 2021-10-02 PROCEDURE — 6370000000 HC RX 637 (ALT 250 FOR IP): Performed by: INTERNAL MEDICINE

## 2021-10-02 PROCEDURE — 6370000000 HC RX 637 (ALT 250 FOR IP): Performed by: PHYSICIAN ASSISTANT

## 2021-10-02 PROCEDURE — 94761 N-INVAS EAR/PLS OXIMETRY MLT: CPT

## 2021-10-02 PROCEDURE — 85025 COMPLETE CBC W/AUTO DIFF WBC: CPT

## 2021-10-02 PROCEDURE — 6360000002 HC RX W HCPCS: Performed by: INTERNAL MEDICINE

## 2021-10-02 PROCEDURE — 80048 BASIC METABOLIC PNL TOTAL CA: CPT

## 2021-10-02 RX ORDER — SIMETHICONE 80 MG
80 TABLET,CHEWABLE ORAL 4 TIMES DAILY PRN
Status: DISCONTINUED | OUTPATIENT
Start: 2021-10-02 | End: 2021-10-13 | Stop reason: HOSPADM

## 2021-10-02 RX ADMIN — METFORMIN HYDROCHLORIDE 500 MG: 500 TABLET ORAL at 18:06

## 2021-10-02 RX ADMIN — CARBIDOPA AND LEVODOPA 2 TABLET: 25; 100 TABLET ORAL at 20:23

## 2021-10-02 RX ADMIN — CARBIDOPA AND LEVODOPA 1 TABLET: 25; 100 TABLET ORAL at 14:44

## 2021-10-02 RX ADMIN — CARBIDOPA AND LEVODOPA 2 TABLET: 25; 100 TABLET ORAL at 09:27

## 2021-10-02 RX ADMIN — POTASSIUM BICARBONATE 20 MEQ: 782 TABLET, EFFERVESCENT ORAL at 14:45

## 2021-10-02 RX ADMIN — FAMOTIDINE 20 MG: 20 TABLET ORAL at 20:22

## 2021-10-02 RX ADMIN — ACETAMINOPHEN 650 MG: 325 TABLET ORAL at 06:05

## 2021-10-02 RX ADMIN — FOLIC ACID 1 MG: 1 TABLET ORAL at 09:24

## 2021-10-02 RX ADMIN — DILTIAZEM HYDROCHLORIDE 60 MG: 60 TABLET, FILM COATED ORAL at 20:22

## 2021-10-02 RX ADMIN — CEFEPIME 2000 MG: 2 INJECTION, POWDER, FOR SOLUTION INTRAVENOUS at 04:30

## 2021-10-02 RX ADMIN — SODIUM CHLORIDE, PRESERVATIVE FREE 10 ML: 5 INJECTION INTRAVENOUS at 20:32

## 2021-10-02 RX ADMIN — DILTIAZEM HYDROCHLORIDE 60 MG: 60 TABLET, FILM COATED ORAL at 04:27

## 2021-10-02 RX ADMIN — WARFARIN SODIUM 1 MG: 2 TABLET ORAL at 18:06

## 2021-10-02 RX ADMIN — BACLOFEN 5 MG: 10 TABLET ORAL at 20:23

## 2021-10-02 RX ADMIN — DILTIAZEM HYDROCHLORIDE 60 MG: 60 TABLET, FILM COATED ORAL at 14:45

## 2021-10-02 RX ADMIN — ACETAMINOPHEN 650 MG: 325 TABLET ORAL at 20:22

## 2021-10-02 RX ADMIN — POLYETHYLENE GLYCOL (3350) 17 G: 17 POWDER, FOR SOLUTION ORAL at 18:07

## 2021-10-02 RX ADMIN — DIGOXIN 125 MCG: 125 TABLET ORAL at 09:24

## 2021-10-02 RX ADMIN — CYANOCOBALAMIN TAB 1000 MCG 1000 MCG: 1000 TAB at 09:24

## 2021-10-02 RX ADMIN — FAMOTIDINE 20 MG: 20 TABLET ORAL at 09:24

## 2021-10-02 RX ADMIN — Medication 1000 UNITS: at 09:24

## 2021-10-02 RX ADMIN — LEVOTHYROXINE SODIUM 50 MCG: 0.05 TABLET ORAL at 05:54

## 2021-10-02 RX ADMIN — AMANTADINE HYDROCHLORIDE 100 MG: 100 CAPSULE, LIQUID FILLED ORAL at 20:23

## 2021-10-02 RX ADMIN — CEFEPIME 2000 MG: 2 INJECTION, POWDER, FOR SOLUTION INTRAVENOUS at 18:07

## 2021-10-02 ASSESSMENT — PAIN SCALES - GENERAL
PAINLEVEL_OUTOF10: 9
PAINLEVEL_OUTOF10: 4

## 2021-10-02 NOTE — PROGRESS NOTES
[address] is a 78 y.o. female on warfarin therapy for Afib. Pharmacy consulted by Dr. Andrés Crystal for monitoring and adjustment of treatment. Indication for anticoagulation: Afib  INR goal: 2-3  Warfarin dose prior to admission: 2mg,1mg alternating days     Pertinent Laboratory Values   Recent Labs     09/30/21  0622 09/30/21  0622 10/01/21  0718 10/02/21  0439 10/02/21  0935   INR 2.31  --    < > 2.66  --    HGB 11.9*   < >  --   --  13.3   HCT 37.6   < >  --   --  41.6     --   --   --  223    < > = values in this interval not displayed. Assessment/Plan:   Drug Interactions: Levothyroxine (home med)   INR 2.66 today, therapeutic    Will continue home warfarin dose of 1mg/2mg alternating   Pharmacy will continue to monitor and adjust warfarin therapy as indicated    Thank you for the consult.   Kevin Murcia [place]  10/2/2021 1:23 PM

## 2021-10-02 NOTE — PROGRESS NOTES
Hospitalist Progress Note      Name:  Padmini [facility] /Age/Sex: 1940  (78 y.o. female)   MRN & CSN:  [id number] & [id number] Admission Date/Time: 2021 11:26 AM   Location:  [facility]/4111- PCP: Ronan Paul MD         Hospital Day: 4    Assessment and Plan:   Mardy Angelucci is a 78 y.o.  female with past medical history of Parkinson's disease who was admitted for A. fib with rapid ventricular response, confusion and found to have urinary tract infection. Urine culture came back positive for Klebsiella. Patient remains confused. Patient daughter was concerned about abnormal extension of a tissue from her femalegenitalia and wanted OB/GYN to see patient. OB/GYN consult pending. 1.         Paroxysmal atrial fibrillation with rapid ventricular response: Rate controlled  2. Urinary tract infection with urine culture being positive for Klebsiella  3. Metabolic encephalopathy & hospital delerium  4. History of Parkinson's disease  5. Other medical problem include diabetes, hypothyroidism  6. Abnormal skin extension from genitalia  7. Hypomagnesemia Mg 1.6    Plan:  Patient mental status with some improvement. Responding to simple questions appropriately. Patient having difficulty going on bed pan. Miralax ordered. Will trial laxative to aid patient. Patient is taking in oral fluids and diet. LE with some swelling. Given PO intake will decrease IVF to half maintenance. PAF in rate control on Digoxin 125mcg & Diltiazem 60mg q8hrs. Convert to long acting one time daily dosing if rate remains stable. INR Therapeutic on Coumadin. Culture came back positive for Klebsiella: Continue Cefepime cx sensitive  Patient's Parkinson disease regimen is being adjusted by neurology.     Per Neuro  · Continue Sinemet 25/100 mg 2 tablets three times daily to 2 tablets in the morning, 1 tablet mid day and 2 tablets at night  OB/GYN consult pending for an abnormal skin growth from patient's genitalia. PT/OT eval and treat  Continue patients home medications as indicated for her chronic medical conditions    Diet ADULT DIET; Regular; 5 carb choices (75 gm/meal)   DVT Prophylaxis [x] Coumadin   GI Prophylaxis [] PPI,  [x] H2 Blocker,  [] Carafate,  [] Diet/Tube Feeds   Code Status Full Code   Disposition Patient requires continued admission due to Acute UTI   MDM [] Low, [] Moderate,[x]  High     History of Present Illness:     Chief Complaint: Acute encephalopathy  Julien Mercado is a 78 y.o.  female  who presents with UTI & metabolic encephalopathy. Patient appears in mild distress. States she has to have a bowel movement. However, has difficulty going on bed pan. Agreeable to laxative. Miralax ordered. Nursing to administer as tolerated. Remains disoriented but cooperative. Ten point ROS reviewed negative, unless as noted above    Objective:   No intake or output data in the 24 hours ending 10/02/21 1453   Vitals:   Vitals:    10/02/21 0757   BP: (!) 140/69   Pulse: 72   Resp: 14   Temp: 98 °F (36.7 °C)   SpO2: 93%     Physical Exam:   GEN Awake female, sitting upright in chair. Appears given age. EYES Pupils are equally round. No scleral erythema, discharge, or conjunctivitis. HENT Mucous membranes are moist. Oral pharynx without exudates, no evidence of thrush. RESP Clear to auscultation, no wheezes, rales or rhonchi. Symmetric chest movement while on room air. CARDIO/VASC S1/S2 auscultated. Regular rate without appreciable murmurs, rubs, or gallops. No JVD or carotid bruits. Peripheral pulses equal bilaterally and palpable. B/l 1+ piting peripheral edema. GI Abdomen is soft without significant tenderness, masses, or guarding. Bowel sounds are normoactive. Rectal exam deferred.  No costovertebral angle tenderness. Normal appearing external genitalia. Woods catheter is not present. MSK No gross joint deformities.   SKIN Normal coloration, warm, dry. NEURO Cranial nerves appear grossly intact, normal speech, no lateralizing weakness. PSYCH Awake, alert, oriented x 1. Affect appropriate.     Medications:   Medications:    carbidopa-levodopa  1 tablet Oral Daily    digoxin  125 mcg Oral Daily    carbidopa-levodopa  2 tablet Oral BID    amantadine  100 mg Oral Nightly    vitamin D  1 capsule Oral Daily    folic acid  1 mg Oral Daily    levothyroxine  50 mcg Oral Daily    metFORMIN  500 mg Oral Dinner    vitamin B-12  1,000 mcg Oral Every Other Day    sodium chloride flush  10 mL IntraVENous 2 times per day    insulin lispro  0-6 Units SubCUTAneous TID WC    insulin lispro  0-3 Units SubCUTAneous Nightly    famotidine  20 mg Oral BID    cefepime  2,000 mg IntraVENous Q12H    dilTIAZem  60 mg Oral 3 times per day    warfarin  2 mg Oral Every Other Day    warfarin  1 mg Oral Every Other Day      Infusions:    sodium chloride 40 mL/hr at 10/02/21 1445    sodium chloride      dextrose       PRN Meds: simethicone, 80 mg, 4x Daily PRN  albuterol sulfate HFA, 2 puff, Q6H PRN  sodium chloride flush, 10 mL, PRN  sodium chloride, 25 mL, PRN  potassium chloride, 10 mEq, PRN  magnesium sulfate, 1,000 mg, PRN  ondansetron, 4 mg, Q8H PRN   Or  ondansetron, 4 mg, Q6H PRN  polyethylene glycol, 17 g, Daily PRN  acetaminophen, 650 mg, Q6H PRN   Or  acetaminophen, 650 mg, Q6H PRN  traMADol, 50 mg, Q6H PRN  glucose, 15 g, PRN  dextrose, 12.5 g, PRN  glucagon (rDNA), 1 mg, PRN  dextrose, 100 mL/hr, PRN  baclofen, 5 mg, Q4H PRN          Electronically signed by Any Garibay DO on 10/2/2021 at 2:53 PM

## 2021-10-02 NOTE — PROGRESS NOTES
Neurology Service Consult Note  [facility]   Patient Name: Aris Brown [facility]  : 1940        Subjective:   Reason for consult: altered mental status     Patient was seen and examined this morning. Chart reviewed. Daughter was at bedside. States patient is more confused and was unable to recognize her last night. She states patient has not slept well since being here. She is upset patient sat in a wet bed for over an hour before being changed. Discussed at length plan of care. She states patient is supposed to have a follow up appointment on Tuesday in our office. Past Medical History:   Diagnosis Date    Atrial fibrillation ([facility] [address] 2016    per holter monitor    Back pain     severe degeneration    Gallstone     H/O cardiovascular stress test 2014    cardiolite-normal, no ischemia, EF70%    H/O Doppler ultrasound 10/1/14    Carotid doppler. No hemodynamically significant stenosis bilaterally.  H/O echocardiogram 2014    EF >57%, mild mitral and tricuspid regurg, normal LV systolic but abnormal diastolic function, no pericardial effusion.  History of echocardiogram 2020    EF 50%, Mild septal asymmetric hypertrophy, Grade II DD, Mildly dilated left atrium, Normal pulmonary artery pressure, no pericardial effusion    History of Holter monitoring 2016    new onset afib    Hx of Carotid Doppler ultrasound 2020    Mild 0-49% disease of the bilateral ICA, Normal vertebral flow    Leg cramping     Recurrent urinary tract infection     Spinal stenosis of lumbar region     Vitamin B12 deficiency     Vitamin D deficiency     :   History reviewed. No pertinent surgical history.   Medications:  Scheduled Meds:   carbidopa-levodopa  1 tablet Oral Daily    digoxin  125 mcg Oral Daily    carbidopa-levodopa  2 tablet Oral BID    amantadine  100 mg Oral Nightly    vitamin D  1 capsule Oral Daily    folic acid  1 mg Oral Daily    levothyroxine  50 mcg Oral Daily    metFORMIN  500 mg Oral Dinner    vitamin B-12  1,000 mcg Oral Every Other Day    sodium chloride flush  10 mL IntraVENous 2 times per day    insulin lispro  0-6 Units SubCUTAneous TID WC    insulin lispro  0-3 Units SubCUTAneous Nightly    famotidine  20 mg Oral BID    cefepime  2,000 mg IntraVENous Q12H    dilTIAZem  60 mg Oral 3 times per day    warfarin  2 mg Oral Every Other Day    warfarin  1 mg Oral Every Other Day     Continuous Infusions:   sodium chloride 75 mL/hr at 10/01/21 0622    sodium chloride      dextrose       PRN Meds:.albuterol sulfate HFA, sodium chloride flush, sodium chloride, potassium chloride, magnesium sulfate, ondansetron **OR** ondansetron, polyethylene glycol, acetaminophen **OR** acetaminophen, traMADol, glucose, dextrose, glucagon (rDNA), dextrose, baclofen    Allergies   Allergen Reactions    Ciprofibrate     Ciprofloxacin Other (See Comments)     Anorexia      Pregabalin      Social History     Socioeconomic History    Marital status:      Spouse name: Not on file    Number of children: Not on file    Years of education: Not on file    Highest education level: Not on file   Occupational History    Not on file   Tobacco Use    Smoking status: Never Smoker    Smokeless tobacco: Never Used   Vaping Use    Vaping Use: Never used   Substance and Sexual Activity    Alcohol use: No     Comment: caffeine 1 pop a day    Drug use: No    Sexual activity: Not on file   Other Topics Concern    Not on file   Social History Narrative    Not on file     Social Determinants of Health     Financial Resource Strain: Low Risk     Difficulty of Paying Living Expenses: Not hard at all   Food Insecurity: No Food Insecurity    Worried About Running Out of Food in the Last Year: Never true    Melanie of Food in the Last Year: Never true   Transportation Needs:     Lack of Transportation (Medical):      Lack of Transportation (Non-Medical):    Physical Activity:     Days of Exercise per Week:     Minutes of Exercise per Session:    Stress:     Feeling of Stress :    Social Connections:     Frequency of Communication with Friends and Family:     Frequency of Social Gatherings with Friends and Family:     Attends Yazdanism Services:     Active Member of Clubs or Organizations:     Attends Club or Organization Meetings:     Marital Status:    Intimate Partner Violence:     Fear of Current or Ex-Partner:     Emotionally Abused:     Physically Abused:     Sexually Abused:       Family History   Problem Relation Age of Onset    High Blood Pressure Sister     Heart Disease Brother        Review of Symptoms:    10-point system review completed. All of which are negative except as mentioned above. Physical Exam:       Gen: Awake, oriented to self, only this morning  HEENT: NC/AT, EOMI, PERRL, mmm, neck supple, no meningeal signs  Heart: tachycardic per monitor  Lungs: No respiratory distress  Ext: no edema, no calf tenderness b/l  Psych: normal mood and affect  Skin: no rashes or lesions    NEUROLOGIC EXAM:    Mental Status: Awake oriented to to self only this morning NAD, speech clear, language slowed, repetition and naming intact, follows commands appropriately    Cranial Nerve Exam:   CN II-XII: PERRL, VFF, no nystagmus, no gaze paresis, sensation V1-V3 intact b/l, muscles of facial expression symmetric; hearing intact to conversational tone, palate elevates symmetrically, shoulder elevation symmetric and tongue protrudes midline with movement side to side.     Motor Exam:       Movement antigravity in all extremities, I do appreciate some rigidity in all extremities    Deep Tendon Reflexes: 2/4 biceps, triceps, brachioradialis, patellar, and achilles b/l; flexor plantar responses b/l    Sensation: Intact light touch/vibration UE's/LE's b/l    Coordination/Cerebellum:       Tremors--none    Gait and stance:      Gait: deferred      LABS:     Recent Labs     09/29/21  1136 09/29/21  1136 09/30/21  0622 10/01/21  0718 10/02/21  0439 10/02/21  0935   WBC 9.0  --  6.4  --   --  10.1     --  143 139  --   --    K 4.1  --  3.9 3.7  --   --      --  110 110  --   --    CO2 21  --  21 18*  --   --    BUN 30*  --  26* 17  --   --    CREATININE 0.8  --  1.0 0.8  --   --    GLUCOSE 146*  --  112* 135*  --   --    INR 1.98   < > 2.31 2.59 2.66  --     < > = values in this interval not displayed. IMAGING:      CT Head  1. No acute intracranial abnormality.           ASSESSMENT/PLAN:   79 yo female with history of Atrial fibrillation, vitamin D deficiency, Vitamin B12 deficiency, questionable parkinson's disease vs. Parkinsonism presents with irregular heart rate secondary to atrial fibrillation. Neurology consulted for increased confusion and lethargy secondary to encephalopathy superimposed on UTI and possible medication side effect    1. Increased confusion and lethargy secondary to encephalopathy and delirium superimposed on UTI and possible medication side effect  · CT head: see above  · Continue Sinemet 25/100 mg 2 tablets three times daily to 2 tablets in the morning, 1 tablet mid day and 2 tablets at night  · Management of antibiotics for UTI per medicine team  · PT/OT/ST per their recommendations  · Patient established with our office, She has a scheduled appointment with us on Tuesday. Will email our office to see if we can reschedule as a later date  · Discussed at length with daughter it is to be expected for patients with underlying neurodegenerative disease to become more altered in hospital and expect fluctuations. Discussed we hope she will return to her baseline after acute infection has cleared and she is out of the hospital.      Nothing further from our standpoint. We will sign off. Patient care discussed with attending physician, Dr. Cole August.      Thank you for allowing us to participate in the care of your patient. If there are any questions regarding evaluation please feel free to contact us.      Jarocho Daly, [facility] - CNS, 10/2/2021

## 2021-10-03 LAB
ANION GAP SERPL CALCULATED.3IONS-SCNC: 11 MMOL/L (ref 4–16)
BASOPHILS ABSOLUTE: 0.1 K/CU MM
BASOPHILS RELATIVE PERCENT: 0.9 % (ref 0–1)
BUN BLDV-MCNC: 16 MG/DL (ref 6–23)
CALCIUM SERPL-MCNC: 8.5 MG/DL (ref 8.3–10.6)
CHLORIDE BLD-SCNC: 111 MMOL/L (ref 99–110)
CO2: 21 MMOL/L (ref 21–32)
CREAT SERPL-MCNC: 0.9 MG/DL (ref 0.6–1.1)
DIFFERENTIAL TYPE: ABNORMAL
EOSINOPHILS ABSOLUTE: 0.2 K/CU MM
EOSINOPHILS RELATIVE PERCENT: 3.1 % (ref 0–3)
GFR AFRICAN AMERICAN: >60 ML/MIN/1.73M2
GFR NON-AFRICAN AMERICAN: 60 ML/MIN/1.73M2
GLUCOSE BLD-MCNC: 103 MG/DL (ref 70–99)
GLUCOSE BLD-MCNC: 112 MG/DL (ref 70–99)
GLUCOSE BLD-MCNC: 118 MG/DL (ref 70–99)
GLUCOSE BLD-MCNC: 122 MG/DL (ref 70–99)
GLUCOSE BLD-MCNC: 146 MG/DL (ref 70–99)
HCT VFR BLD CALC: 37.7 % (ref 37–47)
HEMOGLOBIN: 12 GM/DL (ref 12.5–16)
HIGH SENSITIVE C-REACTIVE PROTEIN: 127.8 MG/L
IMMATURE NEUTROPHIL %: 0.4 % (ref 0–0.43)
INR BLD: 2.89 INDEX
LYMPHOCYTES ABSOLUTE: 1.4 K/CU MM
LYMPHOCYTES RELATIVE PERCENT: 20.6 % (ref 24–44)
MAGNESIUM: 2.2 MG/DL (ref 1.8–2.4)
MCH RBC QN AUTO: 33.9 PG (ref 27–31)
MCHC RBC AUTO-ENTMCNC: 31.8 % (ref 32–36)
MCV RBC AUTO: 106.5 FL (ref 78–100)
MONOCYTES ABSOLUTE: 0.8 K/CU MM
MONOCYTES RELATIVE PERCENT: 12.2 % (ref 0–4)
NUCLEATED RBC %: 0 %
PDW BLD-RTO: 12.8 % (ref 11.7–14.9)
PLATELET # BLD: 203 K/CU MM (ref 140–440)
PMV BLD AUTO: 10.3 FL (ref 7.5–11.1)
POTASSIUM SERPL-SCNC: 3.7 MMOL/L (ref 3.5–5.1)
PROCALCITONIN: 0.11
PROTHROMBIN TIME: 37.7 SECONDS (ref 11.7–14.5)
RBC # BLD: 3.54 M/CU MM (ref 4.2–5.4)
SEGMENTED NEUTROPHILS ABSOLUTE COUNT: 4.3 K/CU MM
SEGMENTED NEUTROPHILS RELATIVE PERCENT: 62.8 % (ref 36–66)
SODIUM BLD-SCNC: 143 MMOL/L (ref 135–145)
TOTAL IMMATURE NEUTOROPHIL: 0.03 K/CU MM
TOTAL NUCLEATED RBC: 0 K/CU MM
WBC # BLD: 6.8 K/CU MM (ref 4–10.5)

## 2021-10-03 PROCEDURE — 6370000000 HC RX 637 (ALT 250 FOR IP): Performed by: NURSE PRACTITIONER

## 2021-10-03 PROCEDURE — 6370000000 HC RX 637 (ALT 250 FOR IP): Performed by: PHYSICIAN ASSISTANT

## 2021-10-03 PROCEDURE — 85025 COMPLETE CBC W/AUTO DIFF WBC: CPT

## 2021-10-03 PROCEDURE — 6370000000 HC RX 637 (ALT 250 FOR IP): Performed by: FAMILY MEDICINE

## 2021-10-03 PROCEDURE — 36415 COLL VENOUS BLD VENIPUNCTURE: CPT

## 2021-10-03 PROCEDURE — 82962 GLUCOSE BLOOD TEST: CPT

## 2021-10-03 PROCEDURE — 94761 N-INVAS EAR/PLS OXIMETRY MLT: CPT

## 2021-10-03 PROCEDURE — 83735 ASSAY OF MAGNESIUM: CPT

## 2021-10-03 PROCEDURE — 80048 BASIC METABOLIC PNL TOTAL CA: CPT

## 2021-10-03 PROCEDURE — 6370000000 HC RX 637 (ALT 250 FOR IP): Performed by: INTERNAL MEDICINE

## 2021-10-03 PROCEDURE — 84145 PROCALCITONIN (PCT): CPT

## 2021-10-03 PROCEDURE — 6360000002 HC RX W HCPCS: Performed by: INTERNAL MEDICINE

## 2021-10-03 PROCEDURE — 2580000003 HC RX 258: Performed by: INTERNAL MEDICINE

## 2021-10-03 PROCEDURE — 86141 C-REACTIVE PROTEIN HS: CPT

## 2021-10-03 PROCEDURE — 85610 PROTHROMBIN TIME: CPT

## 2021-10-03 PROCEDURE — 1200000000 HC SEMI PRIVATE

## 2021-10-03 RX ORDER — POTASSIUM BICARBONATE 25 MEQ/1
25 TABLET, EFFERVESCENT ORAL 2 TIMES DAILY
Status: DISCONTINUED | OUTPATIENT
Start: 2021-10-03 | End: 2021-10-03

## 2021-10-03 RX ORDER — WARFARIN SODIUM 1 MG/1
1 TABLET ORAL DAILY
Status: DISCONTINUED | OUTPATIENT
Start: 2021-10-03 | End: 2021-10-04

## 2021-10-03 RX ADMIN — METFORMIN HYDROCHLORIDE 500 MG: 500 TABLET ORAL at 19:01

## 2021-10-03 RX ADMIN — POTASSIUM BICARBONATE 20 MEQ: 782 TABLET, EFFERVESCENT ORAL at 22:10

## 2021-10-03 RX ADMIN — DILTIAZEM HYDROCHLORIDE 60 MG: 60 TABLET, FILM COATED ORAL at 22:10

## 2021-10-03 RX ADMIN — DILTIAZEM HYDROCHLORIDE 60 MG: 60 TABLET, FILM COATED ORAL at 15:45

## 2021-10-03 RX ADMIN — WARFARIN SODIUM 1 MG: 1 TABLET ORAL at 19:01

## 2021-10-03 RX ADMIN — CARBIDOPA AND LEVODOPA 2 TABLET: 25; 100 TABLET ORAL at 22:10

## 2021-10-03 RX ADMIN — SODIUM CHLORIDE, PRESERVATIVE FREE 10 ML: 5 INJECTION INTRAVENOUS at 22:10

## 2021-10-03 RX ADMIN — CYANOCOBALAMIN TAB 1000 MCG 1000 MCG: 1000 TAB at 09:39

## 2021-10-03 RX ADMIN — FAMOTIDINE 20 MG: 20 TABLET ORAL at 09:39

## 2021-10-03 RX ADMIN — DILTIAZEM HYDROCHLORIDE 60 MG: 60 TABLET, FILM COATED ORAL at 09:39

## 2021-10-03 RX ADMIN — CEFEPIME 2000 MG: 2 INJECTION, POWDER, FOR SOLUTION INTRAVENOUS at 19:05

## 2021-10-03 RX ADMIN — CARBIDOPA AND LEVODOPA 1 TABLET: 25; 100 TABLET ORAL at 15:46

## 2021-10-03 RX ADMIN — FOLIC ACID 1 MG: 1 TABLET ORAL at 09:39

## 2021-10-03 RX ADMIN — CARBIDOPA AND LEVODOPA 2 TABLET: 25; 100 TABLET ORAL at 09:39

## 2021-10-03 RX ADMIN — LEVOTHYROXINE SODIUM 50 MCG: 0.05 TABLET ORAL at 09:39

## 2021-10-03 RX ADMIN — DIGOXIN 125 MCG: 125 TABLET ORAL at 09:39

## 2021-10-03 RX ADMIN — FAMOTIDINE 20 MG: 20 TABLET ORAL at 22:10

## 2021-10-03 RX ADMIN — AMANTADINE HYDROCHLORIDE 100 MG: 100 CAPSULE, LIQUID FILLED ORAL at 22:10

## 2021-10-03 RX ADMIN — CEFEPIME 2000 MG: 2 INJECTION, POWDER, FOR SOLUTION INTRAVENOUS at 06:23

## 2021-10-03 NOTE — PROGRESS NOTES
[address] is a 78 y.o. female on warfarin therapy for Afib. Pharmacy consulted by Dr. Priti Santos for monitoring and adjustment of treatment. Indication for anticoagulation: Afib  INR goal: 2-3  Warfarin dose prior to admission: 2mg,1mg alternating days     Pertinent Laboratory Values   Recent Labs     10/02/21  0935 10/02/21  0935 10/03/21  0637   INR  --   --  2.89   HGB 13.3   < > 12.0*   HCT 41.6   < > 37.7     --  203    < > = values in this interval not displayed. Assessment/Plan:   Drug Interactions: Levothyroxine (home med)   INR 2.89 today, continuing upward trend on home dose   Will reduce to Warfarin dose of 1mg daily. [address] [place] [address] will continue to monitor and adjust warfarin therapy as indicated    Thank you for the consult.   Melanie Perales, [facility]  10/3/2021 3:41 PM

## 2021-10-03 NOTE — PROGRESS NOTES
Hospitalist Progress Note      Name:  [facility] /Age/Sex: 1940  (78 y.o. female)   MRN & CSN:  [id number] & [id number] Admission Date/Time: 2021 11:26 AM   Location:  [facility]/411- PCP: Celeste Mckeon MD         Hospital Day: 5    Assessment and Plan:   Raisa Helm is a 78 y.o.  female with past medical history of Parkinson's disease who was admitted for A. fib with rapid ventricular response, confusion and found to have urinary tract infection. Urine culture came back positive for Klebsiella. Patient remains confused. Patient daughter was concerned about abnormal extension of a tissue from her femalegenitalia and wanted OB/GYN to see patient. OB/GYN consult pending. 1.         Paroxysmal atrial fibrillation with rapid ventricular response: Rate controlled  2. Urinary tract infection with urine culture being positive for Klebsiella  3. Metabolic encephalopathy & hospital delerium  4. History of Parkinson's disease  5. Other medical problem include diabetes, hypothyroidism  6. Abnormal skin extension from genitalia  7. Hypomagnesemia resolved    Plan:  Patient mental status trending to baseline. Patient is taking in oral fluids and diet. Given PO intake will continue IVF to half maintenance. LE edema resolved. Tolerating well. PAF in rate control on Digoxin 125mcg & Diltiazem 60mg q8hrs. Convert to long acting one time daily dosing if rate remains stable. INR Therapeutic on Coumadin. Culture came back positive for Klebsiella: Continue Cefepime cx sensitive  Procal 0.112, .8  Patient's Parkinson disease regimen is adjusted by neurology who has signed off. Per Neuro  · Continue Sinemet 25/100 mg 2 tablets three times daily to 2 tablets in the morning, 1 tablet mid day and 2 tablets at night  OB/GYN consult pending for an abnormal skin growth from patient's genitalia.   PT/OT eval and treat  Continue patients home medications as indicated for her chronic medical conditions    Diet ADULT DIET; Regular; 5 carb choices (75 gm/meal)   DVT Prophylaxis [x] Coumadin   GI Prophylaxis [] PPI,  [x] H2 Blocker,  [] Carafate,  [] Diet/Tube Feeds   Code Status Full Code   Disposition Patient requires continued admission due to Acute UTI   MDM [] Low, [] Moderate,[x]  High     History of Present Illness:     Chief Complaint: Acute encephalopathy  Arnav Yeboah is a 78 y.o.  female  who presents with UTI & metabolic encephalopathy. Patient seen and examined bedside with son and  present. She is in no acute distress, pleasant and cooperative. She is more alert and quicker to respond today. Appears to be non toxic and in no acute distress. She voices no complaints today. Inflammatory markers down trending. No BM today. Patient denies urge to go. No abdominal pain, nausea, vomiting. Remains debilitated. Will require PT/OT. Ten point ROS reviewed negative, unless as noted above    Objective:   No intake or output data in the 24 hours ending 10/03/21 0950   Vitals:   Vitals:    10/03/21 0624   BP: 127/72   Pulse: 73   Resp: 16   Temp: 98.5 °F (36.9 °C)   SpO2: 96%     Physical Exam:   GEN Awake female, sitting upright in chair. Appears given age. EYES Pupils are equally round. No scleral erythema, discharge, or conjunctivitis. HENT Mucous membranes are moist. Oral pharynx without exudates, no evidence of thrush. RESP Clear to auscultation, no wheezes, rales or rhonchi. Symmetric chest movement while on room air. CARDIO/VASC S1/S2 auscultated. Regular rate without appreciable murmurs, rubs, or gallops. No JVD or carotid bruits. Peripheral pulses equal bilaterally and palpable. Resolved piting peripheral edema. GI Abdomen is soft without significant tenderness, masses, or guarding. Bowel sounds are normoactive. Rectal exam deferred.  No costovertebral angle tenderness. Normal appearing external genitalia.  Woods catheter is not present. MSK No gross joint deformities. SKIN Normal coloration, warm, dry. NEURO Cranial nerves appear grossly intact, normal speech, no lateralizing weakness. PSYCH Awake, alert, oriented x 1. Affect appropriate.     Medications:   Medications:    potassium bicarbonate  25 mEq Oral BID    carbidopa-levodopa  1 tablet Oral Daily    digoxin  125 mcg Oral Daily    carbidopa-levodopa  2 tablet Oral BID    amantadine  100 mg Oral Nightly    vitamin D  1 capsule Oral Daily    folic acid  1 mg Oral Daily    levothyroxine  50 mcg Oral Daily    metFORMIN  500 mg Oral Dinner    vitamin B-12  1,000 mcg Oral Every Other Day    sodium chloride flush  10 mL IntraVENous 2 times per day    insulin lispro  0-6 Units SubCUTAneous TID WC    insulin lispro  0-3 Units SubCUTAneous Nightly    famotidine  20 mg Oral BID    cefepime  2,000 mg IntraVENous Q12H    dilTIAZem  60 mg Oral 3 times per day    warfarin  2 mg Oral Every Other Day    warfarin  1 mg Oral Every Other Day      Infusions:    sodium chloride 40 mL/hr at 10/02/21 1445    sodium chloride      dextrose       PRN Meds: simethicone, 80 mg, 4x Daily PRN  albuterol sulfate HFA, 2 puff, Q6H PRN  sodium chloride flush, 10 mL, PRN  sodium chloride, 25 mL, PRN  potassium chloride, 10 mEq, PRN  magnesium sulfate, 1,000 mg, PRN  ondansetron, 4 mg, Q8H PRN   Or  ondansetron, 4 mg, Q6H PRN  polyethylene glycol, 17 g, Daily PRN  acetaminophen, 650 mg, Q6H PRN   Or  acetaminophen, 650 mg, Q6H PRN  traMADol, 50 mg, Q6H PRN  glucose, 15 g, PRN  dextrose, 12.5 g, PRN  glucagon (rDNA), 1 mg, PRN  dextrose, 100 mL/hr, PRN  baclofen, 5 mg, Q4H PRN          Electronically signed by Kely Handley DO on 10/3/2021 at 9:50 AM

## 2021-10-04 LAB
ANION GAP SERPL CALCULATED.3IONS-SCNC: 13 MMOL/L (ref 4–16)
BASOPHILS ABSOLUTE: 0.1 K/CU MM
BASOPHILS RELATIVE PERCENT: 0.7 % (ref 0–1)
BUN BLDV-MCNC: 19 MG/DL (ref 6–23)
CALCIUM SERPL-MCNC: 8.3 MG/DL (ref 8.3–10.6)
CHLORIDE BLD-SCNC: 111 MMOL/L (ref 99–110)
CO2: 18 MMOL/L (ref 21–32)
CREAT SERPL-MCNC: 0.7 MG/DL (ref 0.6–1.1)
DIFFERENTIAL TYPE: ABNORMAL
EOSINOPHILS ABSOLUTE: 0.2 K/CU MM
EOSINOPHILS RELATIVE PERCENT: 2 % (ref 0–3)
GFR AFRICAN AMERICAN: >60 ML/MIN/1.73M2
GFR NON-AFRICAN AMERICAN: >60 ML/MIN/1.73M2
GLUCOSE BLD-MCNC: 113 MG/DL (ref 70–99)
GLUCOSE BLD-MCNC: 121 MG/DL (ref 70–99)
GLUCOSE BLD-MCNC: 132 MG/DL (ref 70–99)
HCT VFR BLD CALC: 35.5 % (ref 37–47)
HEMOGLOBIN: 11.3 GM/DL (ref 12.5–16)
HIGH SENSITIVE C-REACTIVE PROTEIN: 87 MG/L
IMMATURE NEUTROPHIL %: 0.6 % (ref 0–0.43)
INR BLD: 2.5 INDEX
LYMPHOCYTES ABSOLUTE: 0.9 K/CU MM
LYMPHOCYTES RELATIVE PERCENT: 10.6 % (ref 24–44)
MCH RBC QN AUTO: 33.1 PG (ref 27–31)
MCHC RBC AUTO-ENTMCNC: 31.8 % (ref 32–36)
MCV RBC AUTO: 104.1 FL (ref 78–100)
MONOCYTES ABSOLUTE: 0.9 K/CU MM
MONOCYTES RELATIVE PERCENT: 10.6 % (ref 0–4)
NUCLEATED RBC %: 0 %
PDW BLD-RTO: 12.8 % (ref 11.7–14.9)
PLATELET # BLD: 202 K/CU MM (ref 140–440)
PMV BLD AUTO: 10.5 FL (ref 7.5–11.1)
POTASSIUM SERPL-SCNC: 3.9 MMOL/L (ref 3.5–5.1)
PROCALCITONIN: 0.07
PROTHROMBIN TIME: 32.6 SECONDS (ref 11.7–14.5)
RBC # BLD: 3.41 M/CU MM (ref 4.2–5.4)
SEGMENTED NEUTROPHILS ABSOLUTE COUNT: 6.4 K/CU MM
SEGMENTED NEUTROPHILS RELATIVE PERCENT: 75.5 % (ref 36–66)
SODIUM BLD-SCNC: 142 MMOL/L (ref 135–145)
TOTAL IMMATURE NEUTOROPHIL: 0.05 K/CU MM
TOTAL NUCLEATED RBC: 0 K/CU MM
WBC # BLD: 8.5 K/CU MM (ref 4–10.5)

## 2021-10-04 PROCEDURE — 2700000000 HC OXYGEN THERAPY PER DAY

## 2021-10-04 PROCEDURE — 94761 N-INVAS EAR/PLS OXIMETRY MLT: CPT

## 2021-10-04 PROCEDURE — 94640 AIRWAY INHALATION TREATMENT: CPT

## 2021-10-04 PROCEDURE — 86141 C-REACTIVE PROTEIN HS: CPT

## 2021-10-04 PROCEDURE — 6370000000 HC RX 637 (ALT 250 FOR IP): Performed by: PHYSICIAN ASSISTANT

## 2021-10-04 PROCEDURE — 2580000003 HC RX 258: Performed by: FAMILY MEDICINE

## 2021-10-04 PROCEDURE — 1200000000 HC SEMI PRIVATE

## 2021-10-04 PROCEDURE — 85025 COMPLETE CBC W/AUTO DIFF WBC: CPT

## 2021-10-04 PROCEDURE — 6370000000 HC RX 637 (ALT 250 FOR IP): Performed by: INTERNAL MEDICINE

## 2021-10-04 PROCEDURE — 82962 GLUCOSE BLOOD TEST: CPT

## 2021-10-04 PROCEDURE — 80048 BASIC METABOLIC PNL TOTAL CA: CPT

## 2021-10-04 PROCEDURE — 36415 COLL VENOUS BLD VENIPUNCTURE: CPT

## 2021-10-04 PROCEDURE — 6370000000 HC RX 637 (ALT 250 FOR IP): Performed by: NURSE PRACTITIONER

## 2021-10-04 PROCEDURE — 85610 PROTHROMBIN TIME: CPT

## 2021-10-04 PROCEDURE — 6360000002 HC RX W HCPCS: Performed by: INTERNAL MEDICINE

## 2021-10-04 PROCEDURE — 2580000003 HC RX 258: Performed by: INTERNAL MEDICINE

## 2021-10-04 PROCEDURE — 6370000000 HC RX 637 (ALT 250 FOR IP): Performed by: FAMILY MEDICINE

## 2021-10-04 PROCEDURE — 84145 PROCALCITONIN (PCT): CPT

## 2021-10-04 RX ORDER — WARFARIN SODIUM 3 MG/1
1.5 TABLET ORAL DAILY
Status: DISCONTINUED | OUTPATIENT
Start: 2021-10-04 | End: 2021-10-08

## 2021-10-04 RX ORDER — LANOLIN ALCOHOL/MO/W.PET/CERES
3 CREAM (GRAM) TOPICAL NIGHTLY PRN
Status: DISCONTINUED | OUTPATIENT
Start: 2021-10-04 | End: 2021-10-08

## 2021-10-04 RX ADMIN — CYANOCOBALAMIN TAB 1000 MCG 1000 MCG: 1000 TAB at 08:36

## 2021-10-04 RX ADMIN — METFORMIN HYDROCHLORIDE 500 MG: 500 TABLET ORAL at 17:59

## 2021-10-04 RX ADMIN — DILTIAZEM HYDROCHLORIDE 60 MG: 60 TABLET, FILM COATED ORAL at 20:48

## 2021-10-04 RX ADMIN — FOLIC ACID 1 MG: 1 TABLET ORAL at 08:36

## 2021-10-04 RX ADMIN — CEFEPIME 2000 MG: 2 INJECTION, POWDER, FOR SOLUTION INTRAVENOUS at 06:03

## 2021-10-04 RX ADMIN — ALBUTEROL SULFATE 2 PUFF: 90 AEROSOL, METERED RESPIRATORY (INHALATION) at 11:42

## 2021-10-04 RX ADMIN — ACETAMINOPHEN 650 MG: 325 TABLET ORAL at 20:47

## 2021-10-04 RX ADMIN — FAMOTIDINE 20 MG: 20 TABLET ORAL at 20:48

## 2021-10-04 RX ADMIN — FAMOTIDINE 20 MG: 20 TABLET ORAL at 08:36

## 2021-10-04 RX ADMIN — WARFARIN SODIUM 1.5 MG: 3 TABLET ORAL at 17:59

## 2021-10-04 RX ADMIN — DILTIAZEM HYDROCHLORIDE 60 MG: 60 TABLET, FILM COATED ORAL at 06:04

## 2021-10-04 RX ADMIN — CEFEPIME 2000 MG: 2 INJECTION, POWDER, FOR SOLUTION INTRAVENOUS at 17:59

## 2021-10-04 RX ADMIN — CARBIDOPA AND LEVODOPA 2 TABLET: 25; 100 TABLET ORAL at 08:36

## 2021-10-04 RX ADMIN — ALBUTEROL SULFATE 2 PUFF: 90 AEROSOL, METERED RESPIRATORY (INHALATION) at 02:16

## 2021-10-04 RX ADMIN — LEVOTHYROXINE SODIUM 50 MCG: 0.05 TABLET ORAL at 06:04

## 2021-10-04 RX ADMIN — CARBIDOPA AND LEVODOPA 1 TABLET: 25; 100 TABLET ORAL at 15:23

## 2021-10-04 RX ADMIN — DIGOXIN 125 MCG: 125 TABLET ORAL at 08:35

## 2021-10-04 RX ADMIN — CARBIDOPA AND LEVODOPA 2 TABLET: 25; 100 TABLET ORAL at 20:47

## 2021-10-04 RX ADMIN — Medication 3 MG: at 20:48

## 2021-10-04 RX ADMIN — AMANTADINE HYDROCHLORIDE 100 MG: 100 CAPSULE, LIQUID FILLED ORAL at 20:48

## 2021-10-04 RX ADMIN — MAGNESIUM HYDROXIDE 30 ML: 400 SUSPENSION ORAL at 15:23

## 2021-10-04 RX ADMIN — DILTIAZEM HYDROCHLORIDE 60 MG: 60 TABLET, FILM COATED ORAL at 15:23

## 2021-10-04 RX ADMIN — SODIUM CHLORIDE: 9 INJECTION, SOLUTION INTRAVENOUS at 01:56

## 2021-10-04 RX ADMIN — ACETAMINOPHEN 650 MG: 325 TABLET ORAL at 08:35

## 2021-10-04 RX ADMIN — BACLOFEN 5 MG: 10 TABLET ORAL at 08:35

## 2021-10-04 ASSESSMENT — PAIN SCALES - GENERAL
PAINLEVEL_OUTOF10: 4
PAINLEVEL_OUTOF10: 0
PAINLEVEL_OUTOF10: 0
PAINLEVEL_OUTOF10: 4

## 2021-10-04 NOTE — PROGRESS NOTES
Hospitalist Progress Note      Name:  Wai Gregorio [facility] /Age/Sex: 1940  (78 y.o. female)   MRN & CSN:  [id number] & [id number] Admission Date/Time: 2021 11:26 AM   Location:  [facility] PCP: Lafayette Boeck, MD         Hospital Day: 6    Assessment and Plan:   Nash Mathias is a 78 y.o.  female with past medical history of Parkinson's disease who was admitted for A. fib with rapid ventricular response, confusion and found to have urinary tract infection. Urine culture came back positive for Klebsiella. Patient remains confused. Patient daughter was concerned about abnormal extension of a tissue from her femalegenitalia and wanted OB/GYN to see patient. OB/GYN consult pending. 1.         Paroxysmal atrial fibrillation with rapid ventricular response: Rate controlled  2. Urinary tract infection with urine culture being positive for Klebsiella  3. Metabolic encephalopathy & hospital delerium  4. History of Parkinson's disease  5. Other medical problem include diabetes, hypothyroidism  6. Abnormal skin extension from genitalia  7. Hypomagnesemia resolved    Plan:  Patient mental status trending to baseline. continue IVF to half maintenance. LE edema remains resolved. Encourage PO intake as toleated  Continue Digoxin 125mcg & Diltiazem 60mg q8hrs. INR 2.5 Therapeutic on Coumadin. Culture came back positive for Klebsiella: Continue Cefepime cx sensitive  Procal 0.073, CRP 87 down trending  Patient's Parkinson disease regimen is adjusted by neurology who has signed off. Per Neuro  · Continue Sinemet 25/100 mg 2 tablets three times daily to 2 tablets in the morning, 1 tablet mid day and 2 tablets at night  OB/GYN consult pending for an abnormal skin growth from patient's genitalia. PT/OT eval and treat  Continue patients home medications as indicated for her chronic medical conditions    Diet ADULT DIET;  Regular; 5 carb choices (75 gm/meal)   DVT Prophylaxis [x] Coumadin   GI Prophylaxis [] PPI,  [x] H2 Blocker,  [] Carafate,  [] Diet/Tube Feeds   Code Status Full Code   Disposition Patient requires continued admission due to Acute UTI   MDM [] Low, [] Moderate,[x]  High     History of Present Illness:     Chief Complaint: Acute encephalopathy  Crystal Disla is a 78 y.o.  female  who presents with UTI & metabolic encephalopathy. Case management arranging home health and DME  Crystal Disla requires a patient lift with a Toileting cling for the transfer between bed and a chair, wheelchair and commode.   Without the use of the lift, and Toileting Sling ,  the patient would be bed confined. Patient seen and examined bedside with Daughter present. Patient requests stool softener and sleep aid. After Home health arranged & DME arranged and delivered plan for discharge in 1-2 days. Responding well to IV abx. Procal & CRP down trending Procal 0.073, CRP 87.  Patient remains debilitated. Daughter declines inpatient rehab. Encourage IS wean from 1L O2 as tolerated. Ten point ROS reviewed negative, unless as noted above    Objective:   No intake or output data in the 24 hours ending 10/04/21 1104   Vitals:   Vitals:    10/04/21 0345   BP: 139/74   Pulse: 74   Resp: 16   Temp: 97.7 °F (36.5 °C)   SpO2: 93%     Physical Exam:   GEN Awake female, sitting upright in chair. Appears given age. EYES Pupils are equally round. No scleral erythema, discharge, or conjunctivitis. HENT Mucous membranes are moist. Oral pharynx without exudates, no evidence of thrush. RESP Clear to auscultation, no wheezes, rales or rhonchi. Symmetric chest movement while on room air. CARDIO/VASC S1/S2 auscultated. Regular rate without appreciable murmurs, rubs, or gallops. No JVD or carotid bruits. Peripheral pulses equal bilaterally and palpable. Resolved piting peripheral edema. GI Abdomen is soft without significant tenderness, masses, or guarding. Bowel sounds are normoactive. Rectal exam deferred.  No costovertebral angle tenderness. Normal appearing external genitalia. Woods catheter is not present. MSK No gross joint deformities. SKIN Normal coloration, warm, dry. NEURO Cranial nerves appear grossly intact, normal speech, no lateralizing weakness. PSYCH Awake, alert, oriented x 1. Affect appropriate.     Medications:   Medications:    warfarin  1 mg Oral Daily    carbidopa-levodopa  1 tablet Oral Daily    digoxin  125 mcg Oral Daily    carbidopa-levodopa  2 tablet Oral BID    amantadine  100 mg Oral Nightly    vitamin D  1 capsule Oral Daily    folic acid  1 mg Oral Daily    levothyroxine  50 mcg Oral Daily    metFORMIN  500 mg Oral Dinner    vitamin B-12  1,000 mcg Oral Every Other Day    sodium chloride flush  10 mL IntraVENous 2 times per day    insulin lispro  0-6 Units SubCUTAneous TID WC    insulin lispro  0-3 Units SubCUTAneous Nightly    famotidine  20 mg Oral BID    cefepime  2,000 mg IntraVENous Q12H    dilTIAZem  60 mg Oral 3 times per day      Infusions:    sodium chloride 40 mL/hr at 10/04/21 0156    sodium chloride      dextrose       PRN Meds: simethicone, 80 mg, 4x Daily PRN  albuterol sulfate HFA, 2 puff, Q6H PRN  sodium chloride flush, 10 mL, PRN  sodium chloride, 25 mL, PRN  potassium chloride, 10 mEq, PRN  magnesium sulfate, 1,000 mg, PRN  ondansetron, 4 mg, Q8H PRN   Or  ondansetron, 4 mg, Q6H PRN  polyethylene glycol, 17 g, Daily PRN  acetaminophen, 650 mg, Q6H PRN   Or  acetaminophen, 650 mg, Q6H PRN  traMADol, 50 mg, Q6H PRN  glucose, 15 g, PRN  dextrose, 12.5 g, PRN  glucagon (rDNA), 1 mg, PRN  dextrose, 100 mL/hr, PRN  baclofen, 5 mg, Q4H PRN          Electronically signed by Ty Mclaughlin DO on 10/4/2021 at 11:04 AM

## 2021-10-04 NOTE — PROGRESS NOTES
[address] is a 78 y.o. female on warfarin therapy for Afib. Pharmacy consulted by Dr. June Martin for monitoring and adjustment of treatment. Indication for anticoagulation: Afib  INR goal: 2-3  Warfarin dose prior to admission: 2mg,1mg alternating days     Pertinent Laboratory Values   Recent Labs     10/02/21  0935 10/02/21  0935 10/03/21  0637 10/03/21  0637 10/04/21  0729   INR  --   --  2.89   < > 2.50   HGB 13.3   < > 12.0*   < > 11.3*   HCT 41.6   < > 37.7   < > 35.5*     --  203  --  202    < > = values in this interval not displayed. INR MONITORING  Recent Labs     10/02/21  0439 10/03/21  0637 10/04/21  0729   INR 2.66 2.89 2.50     Assessment/Plan:   Drug Interactions:   Home meds:  Levothyroxine, metformin   Pt continues on cefepime which may increase the effects of warfarin.  INR down to 2.50 today after 1mg doses the past 2 days. Remains therapeutic.  Will increase to warfarin 1.5mg daily tonight and follow INR trends tomorrow. [address] [place] [address] will continue to monitor and adjust warfarin therapy as indicated    Thank you for the consult. Ramandeep Hansen, [facility]  10/4/2021 12:53 PM

## 2021-10-04 NOTE — CARE COORDINATION
Called [phone] and faxed  [phone] Sherial Mortimer with toilet alexandering to Andrew Angeles at [employer].   She will give Tom Lowers info to rep who will process request.

## 2021-10-04 NOTE — CARE COORDINATION
Reviewed chart and spoke with daughter at bedside. Plan remains to take pt home with family and [address]. Discussed possible DME needs and she is requesting a Demetrius List with toileting Sling.   PS to Dr Prachi Mehta requesting orders/documentation for Brigitte.

## 2021-10-05 ENCOUNTER — APPOINTMENT (OUTPATIENT)
Dept: GENERAL RADIOLOGY | Age: 81
DRG: 689 | End: 2021-10-05
Payer: MEDICARE

## 2021-10-05 LAB
ANION GAP SERPL CALCULATED.3IONS-SCNC: 11 MMOL/L (ref 4–16)
BASOPHILS ABSOLUTE: 0.1 K/CU MM
BASOPHILS RELATIVE PERCENT: 0.8 % (ref 0–1)
BUN BLDV-MCNC: 18 MG/DL (ref 6–23)
CALCIUM SERPL-MCNC: 8.3 MG/DL (ref 8.3–10.6)
CHLORIDE BLD-SCNC: 110 MMOL/L (ref 99–110)
CO2: 18 MMOL/L (ref 21–32)
CREAT SERPL-MCNC: 0.5 MG/DL (ref 0.6–1.1)
DIFFERENTIAL TYPE: ABNORMAL
EOSINOPHILS ABSOLUTE: 0.2 K/CU MM
EOSINOPHILS RELATIVE PERCENT: 2.2 % (ref 0–3)
GFR AFRICAN AMERICAN: >60 ML/MIN/1.73M2
GFR NON-AFRICAN AMERICAN: >60 ML/MIN/1.73M2
GLUCOSE BLD-MCNC: 116 MG/DL (ref 70–99)
GLUCOSE BLD-MCNC: 128 MG/DL (ref 70–99)
GLUCOSE BLD-MCNC: 134 MG/DL (ref 70–99)
HCT VFR BLD CALC: 36.5 % (ref 37–47)
HEMOGLOBIN: 11.7 GM/DL (ref 12.5–16)
IMMATURE NEUTROPHIL %: 0.7 % (ref 0–0.43)
INR BLD: 2.53 INDEX
LYMPHOCYTES ABSOLUTE: 0.8 K/CU MM
LYMPHOCYTES RELATIVE PERCENT: 8.2 % (ref 24–44)
MCH RBC QN AUTO: 33 PG (ref 27–31)
MCHC RBC AUTO-ENTMCNC: 32.1 % (ref 32–36)
MCV RBC AUTO: 102.8 FL (ref 78–100)
MONOCYTES ABSOLUTE: 0.9 K/CU MM
MONOCYTES RELATIVE PERCENT: 9.7 % (ref 0–4)
NUCLEATED RBC %: 0 %
PDW BLD-RTO: 12.6 % (ref 11.7–14.9)
PLATELET # BLD: 220 K/CU MM (ref 140–440)
PMV BLD AUTO: 10.2 FL (ref 7.5–11.1)
POTASSIUM SERPL-SCNC: 3.7 MMOL/L (ref 3.5–5.1)
PROTHROMBIN TIME: 33 SECONDS (ref 11.7–14.5)
RBC # BLD: 3.55 M/CU MM (ref 4.2–5.4)
SEGMENTED NEUTROPHILS ABSOLUTE COUNT: 7.2 K/CU MM
SEGMENTED NEUTROPHILS RELATIVE PERCENT: 78.4 % (ref 36–66)
SODIUM BLD-SCNC: 139 MMOL/L (ref 135–145)
TOTAL IMMATURE NEUTOROPHIL: 0.06 K/CU MM
TOTAL NUCLEATED RBC: 0 K/CU MM
WBC # BLD: 9.2 K/CU MM (ref 4–10.5)

## 2021-10-05 PROCEDURE — 6370000000 HC RX 637 (ALT 250 FOR IP): Performed by: HOSPITALIST

## 2021-10-05 PROCEDURE — 2580000003 HC RX 258: Performed by: FAMILY MEDICINE

## 2021-10-05 PROCEDURE — 6370000000 HC RX 637 (ALT 250 FOR IP): Performed by: FAMILY MEDICINE

## 2021-10-05 PROCEDURE — 71045 X-RAY EXAM CHEST 1 VIEW: CPT

## 2021-10-05 PROCEDURE — 2580000003 HC RX 258: Performed by: INTERNAL MEDICINE

## 2021-10-05 PROCEDURE — 36415 COLL VENOUS BLD VENIPUNCTURE: CPT

## 2021-10-05 PROCEDURE — 94664 DEMO&/EVAL PT USE INHALER: CPT

## 2021-10-05 PROCEDURE — 85025 COMPLETE CBC W/AUTO DIFF WBC: CPT

## 2021-10-05 PROCEDURE — 94761 N-INVAS EAR/PLS OXIMETRY MLT: CPT

## 2021-10-05 PROCEDURE — 94150 VITAL CAPACITY TEST: CPT

## 2021-10-05 PROCEDURE — 82962 GLUCOSE BLOOD TEST: CPT

## 2021-10-05 PROCEDURE — 6370000000 HC RX 637 (ALT 250 FOR IP): Performed by: NURSE PRACTITIONER

## 2021-10-05 PROCEDURE — 6360000002 HC RX W HCPCS: Performed by: INTERNAL MEDICINE

## 2021-10-05 PROCEDURE — 92610 EVALUATE SWALLOWING FUNCTION: CPT

## 2021-10-05 PROCEDURE — 74018 RADEX ABDOMEN 1 VIEW: CPT

## 2021-10-05 PROCEDURE — 6370000000 HC RX 637 (ALT 250 FOR IP): Performed by: INTERNAL MEDICINE

## 2021-10-05 PROCEDURE — 2700000000 HC OXYGEN THERAPY PER DAY

## 2021-10-05 PROCEDURE — 1200000000 HC SEMI PRIVATE

## 2021-10-05 PROCEDURE — 85610 PROTHROMBIN TIME: CPT

## 2021-10-05 PROCEDURE — 80048 BASIC METABOLIC PNL TOTAL CA: CPT

## 2021-10-05 RX ORDER — LACTULOSE 10 G/15ML
20 SOLUTION ORAL ONCE
Status: COMPLETED | OUTPATIENT
Start: 2021-10-05 | End: 2021-10-05

## 2021-10-05 RX ORDER — SULFAMETHOXAZOLE AND TRIMETHOPRIM 800; 160 MG/1; MG/1
1 TABLET ORAL EVERY 12 HOURS SCHEDULED
Status: DISCONTINUED | OUTPATIENT
Start: 2021-10-05 | End: 2021-10-07

## 2021-10-05 RX ORDER — BISACODYL 10 MG
10 SUPPOSITORY, RECTAL RECTAL ONCE
Status: COMPLETED | OUTPATIENT
Start: 2021-10-05 | End: 2021-10-06

## 2021-10-05 RX ADMIN — CEFEPIME 2000 MG: 2 INJECTION, POWDER, FOR SOLUTION INTRAVENOUS at 04:11

## 2021-10-05 RX ADMIN — DILTIAZEM HYDROCHLORIDE 60 MG: 60 TABLET, FILM COATED ORAL at 06:38

## 2021-10-05 RX ADMIN — LEVOTHYROXINE SODIUM 50 MCG: 0.05 TABLET ORAL at 06:38

## 2021-10-05 RX ADMIN — DILTIAZEM HYDROCHLORIDE 60 MG: 60 TABLET, FILM COATED ORAL at 23:08

## 2021-10-05 RX ADMIN — SODIUM CHLORIDE: 9 INJECTION, SOLUTION INTRAVENOUS at 04:12

## 2021-10-05 RX ADMIN — BACLOFEN 5 MG: 10 TABLET ORAL at 06:38

## 2021-10-05 RX ADMIN — ACETAMINOPHEN 650 MG: 325 TABLET ORAL at 06:38

## 2021-10-05 RX ADMIN — WARFARIN SODIUM 1.5 MG: 3 TABLET ORAL at 18:05

## 2021-10-05 RX ADMIN — SULFAMETHOXAZOLE AND TRIMETHOPRIM 1 TABLET: 800; 160 TABLET ORAL at 23:08

## 2021-10-05 RX ADMIN — Medication 3 MG: at 23:09

## 2021-10-05 RX ADMIN — METFORMIN HYDROCHLORIDE 500 MG: 500 TABLET ORAL at 18:05

## 2021-10-05 RX ADMIN — ACETAMINOPHEN 650 MG: 325 TABLET ORAL at 23:08

## 2021-10-05 RX ADMIN — AMANTADINE HYDROCHLORIDE 100 MG: 100 CAPSULE, LIQUID FILLED ORAL at 23:09

## 2021-10-05 RX ADMIN — LACTULOSE 20 G: 10 SOLUTION ORAL at 23:09

## 2021-10-05 ASSESSMENT — PAIN SCALES - GENERAL
PAINLEVEL_OUTOF10: 6
PAINLEVEL_OUTOF10: 3
PAINLEVEL_OUTOF10: 0

## 2021-10-05 NOTE — PROGRESS NOTES
Instructed and educated patient and family on use of IS. Patient was very tired and not cooperative. Explained deep breathing and had patient take a few breaths and asked family to encourage patient to breath deep when awake.

## 2021-10-05 NOTE — PROGRESS NOTES
[address] is a 78 y.o. female on warfarin therapy for Afib. Pharmacy consulted by Dr. Keith Bejarano for monitoring and adjustment of treatment. Indication for anticoagulation: Afib  INR goal: 2-3  Warfarin dose prior to admission: 2mg,1mg alternating days     Pertinent Laboratory Values   Recent Labs     10/03/21  0637 10/03/21  0637 10/04/21  0729 10/04/21  0729 10/05/21  0729   INR 2.89   < > 2.50   < > 2.53   HGB 12.0*   < > 11.3*   < > 11.7*   HCT 37.7   < > 35.5*   < > 36.5*     --  202  --  220    < > = values in this interval not displayed. INR MONITORING  Recent Labs     10/03/21  0637 10/04/21  0729 10/05/21  0729   INR 2.89 2.50 2.53     Assessment/Plan:   Drug Interactions:   Home meds:  Levothyroxine, metformin   Pt continues on cefepime which may increase the effects of warfarin.  INR remains therapeutic, steady around 2.53   Continue warfarin 1.5mg daily tonight and follow INR trends tomorrow. [address] [place] [address] will continue to monitor and adjust warfarin therapy as indicated    Thank you for the consult. Katerina Mcgraw, [facility]  10/5/2021 3:20 PM

## 2021-10-05 NOTE — PROGRESS NOTES
Speech Language Pathology  Facility/Department: [facility] 4N   CLINICAL BEDSIDE SWALLOW EVALUATION    NAME: Padmini Dykes  : 1940  MRN: [id number]    IMPRESSIONS: Mardy Angelucci was referred for a bedside swallow evaluation after being admitted to [facility] with UTI, afib with RVR. Medical hx includes Parkinson's disease, dementia, DM, hypothyroidism. Pt has a history of oral dysphagia identified during previous admission in ; no pharyngeal dysphagia identified per MBS and pt was recommended a regular diet/thin liquids at that time. No more recent SLP notes found on chart review. Pt seen for evaluation seated upright in bed, awake, lethargic, cooperative given cues. Daughter, son, and  present throughout. Pt did not follow directions to complete oral mechanism examination. She was presented with PO trials of ice chips, thin liquids via cup/straw, nectar thick liquids via cup/straw, puree, and soft solids. Oral stage moderately impaired, characterized by adequate labial seal, inconsistent oral holding, prolonged mastication, adequate AP transit and oral clearance. Suspect premature pharyngeal entry with thin liquids 2/2 reduced lingual coordination resulting in immediate cough with thin liquids x1. Pharyngeal stage appears grossly intact with adequate swallow initiation/laryngeal elevation. Discussed diet recommendations, POC, and safe feeding protocol with pt's family. Recommend minced and moist diet/nectar thick liquids, total feed, with aspiration precautions. Hold PO when pt is lethargic and not immediately responsive to utensil/straw. Crush pills as able and give in pureed/pudding consistency. SLP will follow. Recommendations/plan d/w RN following evaluation. ADMISSION DATE: 2021  ADMITTING DIAGNOSIS: has Syncope and collapse; Weakness; H/O echocardiogram; Type 2 diabetes mellitus without complication, without long-term current use of insulin ([address]  Acquired hypothyroidism; Pulmonary embolus, right ([address] Right leg DVT ([address] Parkinson's disease ([address] Atrial fibrillation ([address] Spinal stenosis of lumbar region; Vitamin D deficiency; Vitamin B12 deficiency; Gallstone; Recurrent urinary tract infection; Left hemiplegia ([address] UTI (urinary tract infection); and Acute encephalopathy on their problem list.  ONSET DATE: this admission    Recent Chest Xray/CT of Chest: see chart    Date of Eval: 10/5/2021  Evaluating Therapist: LISA Brady    Current Diet level:  Current Diet : Regular  Current Liquid Diet : Thin      Primary Complaint  Patient Complaint: does not state; daughter reports concerns for aspiration    Pain:  Pain Assessment  Pain Level: 6    Reason for Referral  Radha Peres was referred for a bedside swallow evaluation to assess the efficiency of her swallow function, identify signs and symptoms of aspiration and make recommendations regarding safe dietary consistencies, effective compensatory strategies, and safe eating environment. Impression  Dysphagia Diagnosis: Moderate oral stage dysphagia  Dysphagia Outcome Severity Scale: Level 3: Moderate dysphagia- Total assisstance, supervision or strategies. Two or more diet consistencies restricted     Treatment Plan  Requires SLP Intervention: Yes  Duration/Frequency of Treatment: 2-3x/week for LOS  D/C Recommendations: To be determined       Recommended Diet and Intervention  Diet Solids Recommendation: Dysphagia Minced and Moist (Dysphagia II)  Liquid Consistency Recommendation: Mildly Thick (Nectar)  Recommended Form of Meds: Crushed in puree as able     Therapeutic Interventions: Diet tolerance monitoring;Patient/Family education; Therapeutic PO trials with SLP    Compensatory Swallowing Strategies  Compensatory Swallowing Strategies: Upright as possible for all oral intake;Eat/Feed slowly; Small bites/sips; Total feed    Treatment/Goals  Short-term Goals  Timeframe for Short-term Goals: length of admission  Goal 1: Pt will tolerate minced and moist diet/nectar thick liquids with adequate oral manipulation/clearance and no s/s aspiration. Goal 2: Pt will tolerate PO trials of advanced textures with adequate oral manipulation/clearance and no s/s aspiration for safe diet upgrade. Goal 3: Pt will participate in MBSS as indicated. Goal 4: Pt/caregivers will indicate understanding of all recommendations. General  Chart Reviewed: Yes  Behavior/Cognition: Lethargic;Cooperative; Requires cueing  Respiratory Status: O2 via nasual cannula  O2 Device: Nasal cannula  Communication Observation:  (cognitive communication deficits)  Follows Directions: Simple (with cues/encouragement)  Dentition: Adequate; Some missing teeth  Patient Positioning: Upright in bed  Baseline Vocal Quality: Normal  Prior Dysphagia History: yes; history of oral dysphagia, pharyngeal swallow WFL per last MBS 2015  Consistencies Administered: Dysphagia Soft and Bite-Sized (Dysphagia III); Dysphagia Pureed (Dysphagia I); Thin - straw; Ice Chips; Thin - cup;Nectar - teaspoon;Nectar - cup;Nectar - straw           Vision/Hearing  Hearing  Hearing: Within functional limits    Oral Motor Deficits  Oral/Motor  Oral Motor: Exceptions to [facility]    Oral Phase Dysfunction  Oral Phase  Oral Phase: Exceptions     Indicators of Pharyngeal Phase Dysfunction   Pharyngeal Phase  Pharyngeal Phase: WFL    Prognosis  Prognosis  Prognosis for safe diet advancement: good  Barriers/Prognosis Comment: anticipate diet advancement with improvements in mentation  Individuals consulted  Consulted and agree with results and recommendations: RN;Family member    Education  Patient Education: recommendations/plan  Patient Education Response: No evidence of learning  Safety Devices in place: Yes  Type of devices:  All fall risk precautions in place       Therapy Time  SLP Individual Minutes  Time In: 1200  Time Out: [address]  Minutes: 1200 [facility]-SL[facility]  10/5/2021 1:04 PM

## 2021-10-05 NOTE — PROGRESS NOTES
Hospitalist Progress Note      Name:  Dante Reis [facility] /Age/Sex: 1940  (78 y.o. female)   MRN & CSN:  [id number] & [id number] Admission Date/Time: 2021 11:26 AM   Location:  [address] [place] [address] PCP: Katie Gagnon MD         Hospital Day: 7    Assessment and Plan:   Crystal Disla is a 78 y.o.  female with past medical history of Parkinson's disease who was admitted for A. fib with rapid ventricular response, confusion and found to have urinary tract infection. Urine culture came back positive for Klebsiella. Patient remains confused. Patient daughter was concerned about abnormal extension of a tissue from her femalegenitalia and wanted OB/GYN to see patient. OB/GYN consult pending. 1.         Paroxysmal atrial fibrillation with rapid ventricular response: Rate controlled  2. Urinary tract infection with urine culture being positive for Klebsiella  3. Metabolic encephalopathy & hospital delerium  4. History of Parkinson's disease  5. Other medical problem include diabetes, hypothyroidism  6. Abnormal skin extension from genitalia  7. Hypomagnesemia resolved    Plan:  Delirium--patient oscillating btwn hyperactive and hypoactive delirium   Will take mental status time to recover after hospitalization   Consider palliative care for patient, maybe even on outpatient basis   Likely will need increased services on discharge if family refusing SNF placement  Patient titrated off cefepime--may be worsening confusion  continue IVF to half maintenance. LE edema remains resolved. Encourage PO intake as toleated  Continue Digoxin 125mcg & Diltiazem 60mg q8hrs. INR 2.5 Therapeutic on Coumadin. Culture came back positive for Klebsiella: Continue Cefepime cx sensitive  Procal 0.073, CRP 87 down trending  Patient's Parkinson disease regimen is adjusted by neurology who has signed off.   Per Neuro  · Continue Sinemet 25/100 mg 2 tablets three times daily to 2 tablets in the morning, 1 tablet mid day and 2 tablets at night  OB/GYN consult pending for an abnormal skin growth from patient's genitalia. PT/OT eval and treat  Continue patients home medications as indicated for her chronic medical conditions    Diet ADULT DIET; Dysphagia - Minced and Moist; 5 carb choices (75 gm/meal); Mildly Thick (Nectar)   DVT Prophylaxis [x] Coumadin   GI Prophylaxis [] PPI,  [x] H2 Blocker,  [] Carafate,  [] Diet/Tube Feeds   Code Status Full Code   Disposition Patient requires continued admission due to Acute UTI   MDM [] Low, [] Moderate,[x]  High     History of Present Illness:     Chief Complaint: Acute encephalopathy  Cierra Polanco is a 78 y.o.  female  who presents with UTI & metabolic encephalopathy. Patient seen and examined with family and nursing staff at bedside. She is somnolent today, awakens with stimuli and answers questions. Daughter reports patient did not sleep last night. Ten point ROS reviewed negative, unless as noted above    Objective: Intake/Output Summary (Last 24 hours) at 10/5/2021 1849  Last data filed at 10/4/2021 1858  Gross per 24 hour   Intake 240 ml   Output --   Net 240 ml      Vitals:   Vitals:    10/05/21 1521   BP: (!) 151/72   Pulse: 92   Resp: 19   Temp: 98.7 °F (37.1 °C)   SpO2: 94%     Physical Exam:   GEN  female, somnolent lying in bed, Appears given age. EYES Pupils are equally round. No scleral erythema, discharge, or conjunctivitis. HENT Mucous membranes are moist. Oral pharynx without exudates, no evidence of thrush. RESP Clear to auscultation, no wheezes, rales or rhonchi. Symmetric chest movement while on room air. CARDIO/VASC S1/S2 auscultated. Regular rate without appreciable murmurs, rubs, or gallops. GI Abdomen is soft without significant tenderness, masses, or guarding. MSK No gross joint deformities. SKIN Normal coloration, warm, dry.   NEURO Moves all four extremities, weaker on left side baseline per family  PSYCH Awake, alert, oriented x 1. Affect appropriate.     Medications:   Medications:    warfarin  1.5 mg Oral Daily    carbidopa-levodopa  1 tablet Oral Daily    digoxin  125 mcg Oral Daily    carbidopa-levodopa  2 tablet Oral BID    amantadine  100 mg Oral Nightly    vitamin D  1 capsule Oral Daily    folic acid  1 mg Oral Daily    levothyroxine  50 mcg Oral Daily    metFORMIN  500 mg Oral Dinner    vitamin B-12  1,000 mcg Oral Every Other Day    sodium chloride flush  10 mL IntraVENous 2 times per day    insulin lispro  0-6 Units SubCUTAneous TID WC    insulin lispro  0-3 Units SubCUTAneous Nightly    famotidine  20 mg Oral BID    cefepime  2,000 mg IntraVENous Q12H    dilTIAZem  60 mg Oral 3 times per day      Infusions:    sodium chloride 40 mL/hr at 10/05/21 0412    sodium chloride      dextrose       PRN Meds: melatonin, 3 mg, Nightly PRN  simethicone, 80 mg, 4x Daily PRN  albuterol sulfate HFA, 2 puff, Q6H PRN  sodium chloride flush, 10 mL, PRN  sodium chloride, 25 mL, PRN  potassium chloride, 10 mEq, PRN  magnesium sulfate, 1,000 mg, PRN  ondansetron, 4 mg, Q8H PRN   Or  ondansetron, 4 mg, Q6H PRN  polyethylene glycol, 17 g, Daily PRN  acetaminophen, 650 mg, Q6H PRN   Or  acetaminophen, 650 mg, Q6H PRN  traMADol, 50 mg, Q6H PRN  glucose, 15 g, PRN  dextrose, 12.5 g, PRN  glucagon (rDNA), 1 mg, PRN  dextrose, 100 mL/hr, PRN  baclofen, 5 mg, Q4H PRN          Electronically signed by Alok Herrera MD on 10/5/2021 at 6:49 PM

## 2021-10-06 ENCOUNTER — TELEPHONE (OUTPATIENT)
Dept: FAMILY MEDICINE CLINIC | Age: 81
End: 2021-10-06

## 2021-10-06 LAB
ANION GAP SERPL CALCULATED.3IONS-SCNC: 10 MMOL/L (ref 4–16)
BASOPHILS ABSOLUTE: 0.1 K/CU MM
BASOPHILS RELATIVE PERCENT: 1.3 % (ref 0–1)
BUN BLDV-MCNC: 16 MG/DL (ref 6–23)
CALCIUM SERPL-MCNC: 8.4 MG/DL (ref 8.3–10.6)
CHLORIDE BLD-SCNC: 110 MMOL/L (ref 99–110)
CO2: 20 MMOL/L (ref 21–32)
CREAT SERPL-MCNC: 0.5 MG/DL (ref 0.6–1.1)
DIFFERENTIAL TYPE: ABNORMAL
EOSINOPHILS ABSOLUTE: 0.3 K/CU MM
EOSINOPHILS RELATIVE PERCENT: 3.7 % (ref 0–3)
GFR AFRICAN AMERICAN: >60 ML/MIN/1.73M2
GFR NON-AFRICAN AMERICAN: >60 ML/MIN/1.73M2
GLUCOSE BLD-MCNC: 108 MG/DL (ref 70–99)
HCT VFR BLD CALC: 33.8 % (ref 37–47)
HEMOGLOBIN: 10.8 GM/DL (ref 12.5–16)
HIGH SENSITIVE C-REACTIVE PROTEIN: 92.6 MG/L
IMMATURE NEUTROPHIL %: 0.6 % (ref 0–0.43)
INR BLD: 2.84 INDEX
LYMPHOCYTES ABSOLUTE: 1.1 K/CU MM
LYMPHOCYTES RELATIVE PERCENT: 16.6 % (ref 24–44)
MCH RBC QN AUTO: 32.9 PG (ref 27–31)
MCHC RBC AUTO-ENTMCNC: 32 % (ref 32–36)
MCV RBC AUTO: 103 FL (ref 78–100)
MONOCYTES ABSOLUTE: 0.9 K/CU MM
MONOCYTES RELATIVE PERCENT: 12.5 % (ref 0–4)
NUCLEATED RBC %: 0 %
PDW BLD-RTO: 12.7 % (ref 11.7–14.9)
PLATELET # BLD: 220 K/CU MM (ref 140–440)
PMV BLD AUTO: 10 FL (ref 7.5–11.1)
POTASSIUM SERPL-SCNC: 3.7 MMOL/L (ref 3.5–5.1)
PROCALCITONIN: 0.09
PROTHROMBIN TIME: 37 SECONDS (ref 11.7–14.5)
RBC # BLD: 3.28 M/CU MM (ref 4.2–5.4)
SEGMENTED NEUTROPHILS ABSOLUTE COUNT: 4.4 K/CU MM
SEGMENTED NEUTROPHILS RELATIVE PERCENT: 65.3 % (ref 36–66)
SODIUM BLD-SCNC: 140 MMOL/L (ref 135–145)
TOTAL IMMATURE NEUTOROPHIL: 0.04 K/CU MM
TOTAL NUCLEATED RBC: 0 K/CU MM
WBC # BLD: 6.8 K/CU MM (ref 4–10.5)

## 2021-10-06 PROCEDURE — 36415 COLL VENOUS BLD VENIPUNCTURE: CPT

## 2021-10-06 PROCEDURE — 6370000000 HC RX 637 (ALT 250 FOR IP): Performed by: INTERNAL MEDICINE

## 2021-10-06 PROCEDURE — 85025 COMPLETE CBC W/AUTO DIFF WBC: CPT

## 2021-10-06 PROCEDURE — 6370000000 HC RX 637 (ALT 250 FOR IP): Performed by: FAMILY MEDICINE

## 2021-10-06 PROCEDURE — 6370000000 HC RX 637 (ALT 250 FOR IP): Performed by: PHYSICIAN ASSISTANT

## 2021-10-06 PROCEDURE — 86141 C-REACTIVE PROTEIN HS: CPT

## 2021-10-06 PROCEDURE — 6370000000 HC RX 637 (ALT 250 FOR IP): Performed by: HOSPITALIST

## 2021-10-06 PROCEDURE — 6370000000 HC RX 637 (ALT 250 FOR IP): Performed by: NURSE PRACTITIONER

## 2021-10-06 PROCEDURE — 2700000000 HC OXYGEN THERAPY PER DAY

## 2021-10-06 PROCEDURE — 85610 PROTHROMBIN TIME: CPT

## 2021-10-06 PROCEDURE — 94761 N-INVAS EAR/PLS OXIMETRY MLT: CPT

## 2021-10-06 PROCEDURE — 80048 BASIC METABOLIC PNL TOTAL CA: CPT

## 2021-10-06 PROCEDURE — 84145 PROCALCITONIN (PCT): CPT

## 2021-10-06 PROCEDURE — 1200000000 HC SEMI PRIVATE

## 2021-10-06 RX ORDER — LACTULOSE 10 G/15ML
20 SOLUTION ORAL ONCE
Status: COMPLETED | OUTPATIENT
Start: 2021-10-06 | End: 2021-10-07

## 2021-10-06 RX ORDER — CALCIUM CARBONATE 200(500)MG
500 TABLET,CHEWABLE ORAL 3 TIMES DAILY PRN
Status: DISCONTINUED | OUTPATIENT
Start: 2021-10-06 | End: 2021-10-13 | Stop reason: HOSPADM

## 2021-10-06 RX ADMIN — LEVOTHYROXINE SODIUM 50 MCG: 0.05 TABLET ORAL at 09:15

## 2021-10-06 RX ADMIN — MAGESIUM CITRATE 296 ML: 1.75 LIQUID ORAL at 14:48

## 2021-10-06 RX ADMIN — DILTIAZEM HYDROCHLORIDE 60 MG: 60 TABLET, FILM COATED ORAL at 20:48

## 2021-10-06 RX ADMIN — DIGOXIN 125 MCG: 125 TABLET ORAL at 09:15

## 2021-10-06 RX ADMIN — Medication 3 MG: at 20:52

## 2021-10-06 RX ADMIN — DILTIAZEM HYDROCHLORIDE 60 MG: 60 TABLET, FILM COATED ORAL at 09:16

## 2021-10-06 RX ADMIN — BISACODYL 10 MG: 10 SUPPOSITORY RECTAL at 09:16

## 2021-10-06 RX ADMIN — SULFAMETHOXAZOLE AND TRIMETHOPRIM 1 TABLET: 800; 160 TABLET ORAL at 09:15

## 2021-10-06 RX ADMIN — METFORMIN HYDROCHLORIDE 500 MG: 500 TABLET ORAL at 17:09

## 2021-10-06 RX ADMIN — WARFARIN SODIUM 1.5 MG: 3 TABLET ORAL at 17:09

## 2021-10-06 RX ADMIN — CYANOCOBALAMIN TAB 1000 MCG 1000 MCG: 1000 TAB at 09:15

## 2021-10-06 RX ADMIN — CARBIDOPA AND LEVODOPA 1 TABLET: 25; 100 TABLET ORAL at 14:48

## 2021-10-06 RX ADMIN — CARBIDOPA AND LEVODOPA 2 TABLET: 25; 100 TABLET ORAL at 09:16

## 2021-10-06 RX ADMIN — FAMOTIDINE 20 MG: 20 TABLET ORAL at 20:49

## 2021-10-06 RX ADMIN — SULFAMETHOXAZOLE AND TRIMETHOPRIM 1 TABLET: 800; 160 TABLET ORAL at 20:48

## 2021-10-06 RX ADMIN — FOLIC ACID 1 MG: 1 TABLET ORAL at 09:15

## 2021-10-06 RX ADMIN — FAMOTIDINE 20 MG: 20 TABLET ORAL at 09:16

## 2021-10-06 RX ADMIN — ACETAMINOPHEN 650 MG: 325 TABLET ORAL at 20:49

## 2021-10-06 RX ADMIN — DILTIAZEM HYDROCHLORIDE 60 MG: 60 TABLET, FILM COATED ORAL at 14:48

## 2021-10-06 ASSESSMENT — PAIN SCALES - GENERAL: PAINLEVEL_OUTOF10: 3

## 2021-10-06 NOTE — PLAN OF CARE
Nutrition Problem #1: Inadequate oral intake  Intervention: Food and/or Nutrient Delivery: Modify Current Diet, Start Oral Nutrition Supplement  Nutritional Goals: Pt will consume greater than half of her meals and supplements

## 2021-10-06 NOTE — CARE COORDINATION
CM met with dtr. Dtr had specific questions for home care. TOMMY placed a PS to [facility] and she will call dtr Jones Arevalo to answer any questions. TOMMY called [facility] regarding the ordering of a abram and sling. TOMMY spoke with [employer]. TOMMY informed that they will call [employer] once insurance is verified and they will then set up transport. TOMMY confirmed Maral's contact information. [facility] updated [employer] and gave her Contact information for [employer].  [address]

## 2021-10-06 NOTE — PROGRESS NOTES
Spoke with Debi Rahman from  Dr. Jacki Childs for c order. She will send a note back and have someone call\ [facility] with verbal order.

## 2021-10-06 NOTE — PROGRESS NOTES
[address] is a 78 y.o. female on warfarin therapy for Afib. Pharmacy consulted by Dr. Jam Sanford for monitoring and adjustment of treatment. Indication for anticoagulation: Afib  INR goal: 2-3  Warfarin dose prior to admission: 2mg,1mg alternating days     Pertinent Laboratory Values   Recent Labs     10/04/21  0729 10/04/21  0729 10/05/21  0729 10/05/21  0729 10/06/21  0744   INR 2.50   < > 2.53   < > 2.84   HGB 11.3*   < > 11.7*   < > 10.8*   HCT 35.5*   < > 36.5*   < > 33.8*     --  220  --  220    < > = values in this interval not displayed. INR MONITORING  Recent Labs     10/04/21  0729 10/05/21  0729 10/06/21  0744   INR 2.50 2.53 2.84     Assessment/Plan:   Drug Interactions:   Home meds:  Levothyroxine, metformin   Pt continues on cefepime which may increase the effects of warfarin.  INR remains therapeutic   Continue warfarin 1.5mg daily tonight and follow INR trends tomorrow. [address] [place] [address] will continue to monitor and adjust warfarin therapy as indicated    Thank you for the consult.   IRMA [facility]  10/6/2021 11:13 AM

## 2021-10-06 NOTE — PROGRESS NOTES
[address]iano Liaison spoke with pt and pts daughter Marylene Sizer and is agreeable to [facility] at discharge.  Please place inpatient consult to home health needs order in [facility] [place].

## 2021-10-07 LAB
ANION GAP SERPL CALCULATED.3IONS-SCNC: 11 MMOL/L (ref 4–16)
BASOPHILS ABSOLUTE: 0.1 K/CU MM
BASOPHILS RELATIVE PERCENT: 0.6 % (ref 0–1)
BUN BLDV-MCNC: 18 MG/DL (ref 6–23)
CALCIUM SERPL-MCNC: 8.6 MG/DL (ref 8.3–10.6)
CHLORIDE BLD-SCNC: 108 MMOL/L (ref 99–110)
CO2: 21 MMOL/L (ref 21–32)
CREAT SERPL-MCNC: 0.5 MG/DL (ref 0.6–1.1)
DIFFERENTIAL TYPE: ABNORMAL
EOSINOPHILS ABSOLUTE: 0.3 K/CU MM
EOSINOPHILS RELATIVE PERCENT: 3.2 % (ref 0–3)
GFR AFRICAN AMERICAN: >60 ML/MIN/1.73M2
GFR NON-AFRICAN AMERICAN: >60 ML/MIN/1.73M2
GLUCOSE BLD-MCNC: 122 MG/DL (ref 70–99)
GLUCOSE BLD-MCNC: 127 MG/DL (ref 70–99)
GLUCOSE BLD-MCNC: 127 MG/DL (ref 70–99)
GLUCOSE BLD-MCNC: 146 MG/DL (ref 70–99)
GLUCOSE BLD-MCNC: 88 MG/DL (ref 70–99)
HCT VFR BLD CALC: 35.3 % (ref 37–47)
HEMOGLOBIN: 11.3 GM/DL (ref 12.5–16)
IMMATURE NEUTROPHIL %: 0.8 % (ref 0–0.43)
INR BLD: 2.82 INDEX
LYMPHOCYTES ABSOLUTE: 1.1 K/CU MM
LYMPHOCYTES RELATIVE PERCENT: 13.9 % (ref 24–44)
MCH RBC QN AUTO: 32.8 PG (ref 27–31)
MCHC RBC AUTO-ENTMCNC: 32 % (ref 32–36)
MCV RBC AUTO: 102.3 FL (ref 78–100)
MONOCYTES ABSOLUTE: 0.9 K/CU MM
MONOCYTES RELATIVE PERCENT: 10.7 % (ref 0–4)
NUCLEATED RBC %: 0 %
PDW BLD-RTO: 12.8 % (ref 11.7–14.9)
PLATELET # BLD: 260 K/CU MM (ref 140–440)
PMV BLD AUTO: 10.2 FL (ref 7.5–11.1)
POTASSIUM SERPL-SCNC: 3.9 MMOL/L (ref 3.5–5.1)
PROTHROMBIN TIME: 36.7 SECONDS (ref 11.7–14.5)
RBC # BLD: 3.45 M/CU MM (ref 4.2–5.4)
SEGMENTED NEUTROPHILS ABSOLUTE COUNT: 5.6 K/CU MM
SEGMENTED NEUTROPHILS RELATIVE PERCENT: 70.8 % (ref 36–66)
SODIUM BLD-SCNC: 140 MMOL/L (ref 135–145)
TOTAL IMMATURE NEUTOROPHIL: 0.06 K/CU MM
TOTAL NUCLEATED RBC: 0 K/CU MM
WBC # BLD: 7.9 K/CU MM (ref 4–10.5)

## 2021-10-07 PROCEDURE — 97535 SELF CARE MNGMENT TRAINING: CPT

## 2021-10-07 PROCEDURE — 6360000002 HC RX W HCPCS: Performed by: HOSPITALIST

## 2021-10-07 PROCEDURE — 92526 ORAL FUNCTION THERAPY: CPT

## 2021-10-07 PROCEDURE — 94761 N-INVAS EAR/PLS OXIMETRY MLT: CPT

## 2021-10-07 PROCEDURE — 1200000000 HC SEMI PRIVATE

## 2021-10-07 PROCEDURE — 2700000000 HC OXYGEN THERAPY PER DAY

## 2021-10-07 PROCEDURE — 97530 THERAPEUTIC ACTIVITIES: CPT

## 2021-10-07 PROCEDURE — 80048 BASIC METABOLIC PNL TOTAL CA: CPT

## 2021-10-07 PROCEDURE — 2580000003 HC RX 258: Performed by: INTERNAL MEDICINE

## 2021-10-07 PROCEDURE — 97110 THERAPEUTIC EXERCISES: CPT

## 2021-10-07 PROCEDURE — 6370000000 HC RX 637 (ALT 250 FOR IP): Performed by: INTERNAL MEDICINE

## 2021-10-07 PROCEDURE — 6370000000 HC RX 637 (ALT 250 FOR IP): Performed by: FAMILY MEDICINE

## 2021-10-07 PROCEDURE — 82962 GLUCOSE BLOOD TEST: CPT

## 2021-10-07 PROCEDURE — 6370000000 HC RX 637 (ALT 250 FOR IP): Performed by: NURSE PRACTITIONER

## 2021-10-07 PROCEDURE — 6370000000 HC RX 637 (ALT 250 FOR IP): Performed by: PHYSICIAN ASSISTANT

## 2021-10-07 PROCEDURE — 6370000000 HC RX 637 (ALT 250 FOR IP): Performed by: HOSPITALIST

## 2021-10-07 PROCEDURE — 36415 COLL VENOUS BLD VENIPUNCTURE: CPT

## 2021-10-07 PROCEDURE — 85610 PROTHROMBIN TIME: CPT

## 2021-10-07 PROCEDURE — 85025 COMPLETE CBC W/AUTO DIFF WBC: CPT

## 2021-10-07 RX ORDER — DIPHENHYDRAMINE HYDROCHLORIDE 50 MG/ML
25 INJECTION INTRAMUSCULAR; INTRAVENOUS EVERY 6 HOURS PRN
Status: DISCONTINUED | OUTPATIENT
Start: 2021-10-07 | End: 2021-10-08

## 2021-10-07 RX ADMIN — ACETAMINOPHEN 650 MG: 325 TABLET ORAL at 17:49

## 2021-10-07 RX ADMIN — CARBIDOPA AND LEVODOPA 2 TABLET: 25; 100 TABLET ORAL at 08:41

## 2021-10-07 RX ADMIN — DILTIAZEM HYDROCHLORIDE 60 MG: 60 TABLET, FILM COATED ORAL at 14:15

## 2021-10-07 RX ADMIN — SODIUM CHLORIDE, PRESERVATIVE FREE 10 ML: 5 INJECTION INTRAVENOUS at 20:43

## 2021-10-07 RX ADMIN — PHENOL 1 SPRAY: 1.4 LIQUID ORAL at 08:42

## 2021-10-07 RX ADMIN — CARBIDOPA AND LEVODOPA 1 TABLET: 25; 100 TABLET ORAL at 10:53

## 2021-10-07 RX ADMIN — DIPHENHYDRAMINE HYDROCHLORIDE 25 MG: 50 INJECTION INTRAMUSCULAR; INTRAVENOUS at 10:53

## 2021-10-07 RX ADMIN — ACETAMINOPHEN 650 MG: 325 TABLET ORAL at 09:35

## 2021-10-07 RX ADMIN — BACLOFEN 5 MG: 10 TABLET ORAL at 02:06

## 2021-10-07 RX ADMIN — BACLOFEN 5 MG: 10 TABLET ORAL at 20:42

## 2021-10-07 RX ADMIN — Medication 3 MG: at 20:43

## 2021-10-07 RX ADMIN — SULFAMETHOXAZOLE AND TRIMETHOPRIM 1 TABLET: 800; 160 TABLET ORAL at 08:41

## 2021-10-07 RX ADMIN — Medication 1000 UNITS: at 08:43

## 2021-10-07 RX ADMIN — WARFARIN SODIUM 1.5 MG: 3 TABLET ORAL at 17:49

## 2021-10-07 RX ADMIN — DILTIAZEM HYDROCHLORIDE 60 MG: 60 TABLET, FILM COATED ORAL at 20:43

## 2021-10-07 RX ADMIN — BACLOFEN 5 MG: 10 TABLET ORAL at 16:54

## 2021-10-07 RX ADMIN — AMANTADINE HYDROCHLORIDE 100 MG: 100 CAPSULE, LIQUID FILLED ORAL at 20:43

## 2021-10-07 RX ADMIN — DILTIAZEM HYDROCHLORIDE 60 MG: 60 TABLET, FILM COATED ORAL at 08:41

## 2021-10-07 RX ADMIN — DIGOXIN 125 MCG: 125 TABLET ORAL at 08:42

## 2021-10-07 RX ADMIN — LACTULOSE 20 G: 10 SOLUTION ORAL at 08:41

## 2021-10-07 RX ADMIN — CYANOCOBALAMIN TAB 1000 MCG 1000 MCG: 1000 TAB at 08:41

## 2021-10-07 RX ADMIN — FAMOTIDINE 20 MG: 20 TABLET ORAL at 08:42

## 2021-10-07 RX ADMIN — LEVOTHYROXINE SODIUM 50 MCG: 0.05 TABLET ORAL at 08:41

## 2021-10-07 RX ADMIN — METFORMIN HYDROCHLORIDE 500 MG: 500 TABLET ORAL at 16:54

## 2021-10-07 RX ADMIN — FAMOTIDINE 20 MG: 20 TABLET ORAL at 20:43

## 2021-10-07 RX ADMIN — CARBIDOPA AND LEVODOPA 2 TABLET: 25; 100 TABLET ORAL at 20:43

## 2021-10-07 RX ADMIN — FOLIC ACID 1 MG: 1 TABLET ORAL at 08:41

## 2021-10-07 ASSESSMENT — PAIN SCALES - GENERAL
PAINLEVEL_OUTOF10: 4
PAINLEVEL_OUTOF10: 4

## 2021-10-07 NOTE — PROGRESS NOTES
Physical Therapy  Discussed plan with pt's daughter and , gave education and will return at 54 when pt is at optimal function, time spent was 25 minutes unbillable  Physical Therapy Treatment Note  Name: Qiana Ramos MRN: [id number] :   1940   Date:  10/7/2021   Admission Date: 2021 Room:  [address] [place] [address]   Restrictions/Precautions:  Restrictions/Precautions  Restrictions/Precautions: Fall Risk, General Precautions     parkinson, weakness on L side  Communication with other providers:  Campos Segovia RN states pt is ok to see for therapy  Subjective:  Patient states:  She feels like she has to void  Pain:   Location, Type, Intensity (0/10 to 10/10):  0/10  Objective:    Observation:  Pt was sitting up in the bed talking to her daughter and   Treatment, including education/measures:  Transfers with line management of O2 and tele  Rolling: max A of 2  Supine to sit :max A of 2 , pt sat EOB for 15 min  With max A of 1, while equipment was gathered and positioned  Scooting :max a of 2 to EOB with retro lean  Sit to stand :max A of 1 and SPT to high BSC with max A of 2, pt's daughter steadied the [facility]  Stand to sit :max a of 2  Pt sat on PVC BSC for 20 min trying to have a bm and voiding. discussed results with Campos Segovia RN  Positioned abram pad around pt and used the abram to safely trans pt to the chair. Pt 's daughter assisted with Cues that assist with her mom at home. Safety  Patient left safely in the chair, with call light/phone in reach. Gait belt and mask were used for transfers.   Assessment / Impression:     Patient's tolerance of treatment:  Good, attempted to use her LE's to step over to [facility]   Adverse Reaction: none  Significant change in status and impact:  none  Barriers to improvement:  immobility  Plan for Next Session:    Will cont to work towards pt's goals per her tolerance  Time in:  1308  Time out:  1408  Timed treatment minutes:  60  Total treatment time:  60  Previously filed items:  Social/Functional History  Lives With: Spouse  Home Layout: One level  Home Access: Ramped entrance  Bathroom Toilet: Bedside commode  Home Equipment: Hospital bed, Rolling walker, Wheelchair-manual (+ staning lift)  ADL Assistance: Needs assistance (spouse and dtr assist with all bathing , dressing, toileting ; but pt can often feed an groom self while seated)  Ambulation Assistance: Needs assistance  Transfer Assistance: Needs assistance (spouse and dtr assist p with all transfers , later in the day often has to use sit-latanya lit)  Additional Comments: dtr is pimary caregiver and can be available up to 24/7  Short term goals  Time Frame for Short term goals: 1 week  Short term goal 1: pt will complete bed mobility w/ min A  Short term goal 2: pt will complete transfers w/ mod A  Short term goal 3: pt will ambulate 5 ft using FWW w/ mod A   Electronically signed by:     Johann Wood PTA  10/7/2021, 10:18 AM

## 2021-10-07 NOTE — PROGRESS NOTES
[facility]  DEPARTMENT OF SPEECH/LANGUAGE PATHOLOGY  DAILY PROGRESS NOTE  [facility]  10/7/2021  [id number]  UTI (urinary tract infection) [N39.0]  Generalized weakness [R53.1]  Atrial fibrillation with RVR ([address] [I48.91]  Subtherapeutic international normalized ratio (INR) [R79.1]  Urinary tract infection without hematuria, site unspecified [N39.0]  Allergies   Allergen Reactions    Ciprofibrate     Ciprofloxacin Other (See Comments)     Anorexia      Pregabalin          Pt was seen this date for dysphagia treatment. IMPRESSION AND RECOMMENDATIONS: Daphne Vega was seen for dysphagia follow-up. Daughter and  present throughout. Pt was awake, seated upright in chair, cooperative given cues and increased processing time; she became progressively fatigued as session progressed. Direct meal intervention provided with lunch tray including pureed and minced/moist textures, trials of ice chips and thin liquids via tsp/cup/straw. Oral stage moderately impaired, characterized by reduced labial seal and anterior loss, decreased buccal strength resulting in reduced ability to draw liquid through straw, prolonged/repetitive vertical \"munching\" mastication without bolus formation, and diffuse residue with minced/moist consistencies. Intermittent oral holding also noted with all consistencies prior to initiation of AP transit, improved with lingual placement cues. Pharyngeal stage appears grossly intact without s/s aspiration across all trials. Extensive discussion/education completed with pt/family re: diet recommendations, safe feeding protocol, POC; family indicates understanding/agreement. Recommend continued current diet minced/moist solids, nectar thick liquids. Allow ice chips and sips of thin (unthickened) water for comfort when pt is alert and seated fully upright. SLP will continue to follow.        GOALS (current status in bold):  Short-term Goals  Timeframe for

## 2021-10-07 NOTE — PROGRESS NOTES
Occupational Therapy  . Occupational Therapy Treatment Note      Name: Bryan Donis MRN: [id number] :   1940   Date:  10/7/2021   Admission Date: 2021 Room:  [address] [place] [address]-A     Primary Problem:      Restrictions/Precautions:  Restrictions/Precautions  Restrictions/Precautions: Fall Risk, General Precautions L side weakness    Communication with other providers:  cotx with PTA Terra    Subjective:  Patient states:  Patient speaks very softly  Pain: none stated (location, type, intensity)    Objective:    Observation:  Patient supine with faily present. Family wanting to get patient to [facility] and recliner. Objective Measures:  Pocket tele    Treatment, including education:    1st- educated patient and family on benefits of OT, sitting EOB and exercise. Discussed with family patients history and help at home. Made a plan for a later time this date  (d/t parkinson's diagnosis patient is more appropriate at certain times of the day per daughter) which includes getting to EOB, possibly transferring to [facility] and [facility]. 2nd-    ADL activity training was instructed today. Cues were given for safety, sequence, UE/LE placement, visual cues, and balance. Activities performed today included dressing, toileting,     toileting- Max A on [facility] via abram. Diaper doffed during transfer, roman care done while patient in abram. Very small BM. LB dressing- DEP    Therapeutic activity training was instructed today. Cues were given for safety, sequence, UE/LE placement, awareness, and balance. Activities performed today included bed mobility training, sup-sit, sit-stand, SPT. Rolling- L-R DEP x2  Supine to EOB via log roll- DEP x2  EOB- Max x1- Min A intermittently. vcs for safety. Patient SPT with hug therapy to [facility]  x1  Patient sat on [facility] x15-20 min very little success with fair sitting balance. Patient transfer to recliner via abram.  Educated family on use of

## 2021-10-07 NOTE — PROGRESS NOTES
[address] is a 78 y.o. female on warfarin therapy for Afib. Pharmacy consulted by Dr. Solomon Talbot for monitoring and adjustment of treatment. Indication for anticoagulation: Afib  INR goal: 2-3  Warfarin dose prior to admission: 2mg,1mg alternating days     Pertinent Laboratory Values   Recent Labs     10/05/21  0729 10/05/21  0729 10/06/21  0744 10/06/21  0744 10/07/21  0802   INR 2.53   < > 2.84   < > 2.82   HGB 11.7*   < > 10.8*   < > 11.3*   HCT 36.5*   < > 33.8*   < > 35.3*     --  220  --  260    < > = values in this interval not displayed. INR MONITORING  Recent Labs     10/05/21  0729 10/06/21  0744 10/07/21  0802   INR 2.53 2.84 2.82     Assessment/Plan:   Drug Interactions:   Home meds:  Levothyroxine, metformin   Cefepime discontinued.  INR remains therapeutic   Continue warfarin 1.5mg daily tonight and follow INR trends tomorrow. [address] [place] [address] will continue to monitor and adjust warfarin therapy as indicated    Thank you for the consult. Adamaris Srivastava, [facility]  10/7/2021 3:35 PM

## 2021-10-07 NOTE — PROGRESS NOTES
10/07/21 1535   Oxygen Therapy/Pulse Ox   O2 Therapy Room air   O2 Device None (Room air)   SpO2 92 %   Pt on room air - sats were 92%

## 2021-10-07 NOTE — CARE COORDINATION
Reviewed chart and spoke with pt's  , d/t pt sleeping, plan remains home with family and  HC. He states daughter is in charge. I then spoke with daughter outside room. She states pt will d/c home on Friday or early next week. She is worried about getting Demetrius lift prior to pt discharging. She has called [employer] and left  but very concerned. I then call Arnav Gore at [facility] and she transferred me to Yady Griffin, left  for her about [employer]. [address] Quin[address] Rios with Yady Griffin from [facility] , they need an order form filled out, she will fax it to me at [phone]. She states faxed it to Dr Anisha Correia Fax ???.  Spoke with Dr Paolo Buckley and she will complete form this today.        1510 Faxed order form and documentation to Yady Griffin at [facility] [phone]

## 2021-10-07 NOTE — PROGRESS NOTES
Hospitalist Progress Note      Name:  [facility] /Age/Sex: 1940  (78 y.o. female)   MRN & CSN:  [id number] & [id number] Admission Date/Time: 2021 11:26 AM   [facility]:  [facility] PCP: Paul Rivera MD         Hospital Day: 8    Assessment and Plan:   Peyton Finn is a 78 y.o.  female with past medical history of Parkinson's disease who was admitted for A. fib with rapid ventricular response, confusion and found to have urinary tract infection. Urine culture came back positive for Klebsiella. Patient remains confused. Patient daughter was concerned about abnormal extension of a tissue from her femalegenitalia and wanted OB/GYN to see patient. OB/GYN consult pending. 1.         Paroxysmal atrial fibrillation with rapid ventricular response: Rate controlled  2. Urinary tract infection with urine culture being positive for Klebsiella  3. Metabolic encephalopathy & hospital delerium  4. History of Parkinson's disease  5. Other medical problem include diabetes, hypothyroidism  6. Abnormal skin extension from genitalia  7. Hypomagnesemia resolved    Plan:  AMS improving today, patient more awake and alert   Family states patient reporting throat burning--->add spray, acid reflux meds   --Speech and PT to follow with patient and family tomorrow   Delirium--patient oscillating btwn hyperactive and hypoactive delirium   Will take mental status time to recover after hospitalization  Consider palliative care for patient, maybe even on outpatient basis   Likely will need increased services on discharge if family refusing SNF placement  Patient titrated off cefepime--may be worsening confusion    Continue IVF to half maintenance. LE edema remains resolved. Encourage PO intake as toleated  Continue Digoxin 125mcg & Diltiazem 60mg q8hrs. INR 2.5 Therapeutic on Coumadin.   Culture came back positive for Klebsiella: Continue Cefepime cx sensitive  Procal 0.073, CRP 87 down trending  Patient's Parkinson disease regimen is adjusted by neurology who has signed off. Per Neuro  · Continue Sinemet 25/100 mg 2 tablets three times daily to 2 tablets in the morning, 1 tablet mid day and 2 tablets at night  OB/GYN consult pending for an abnormal skin growth from patient's genitalia. PT/OT eval and treat  Continue patients home medications as indicated for her chronic medical conditions    Diet ADULT DIET; Dysphagia - Minced and Moist; Mildly Thick (Nectar)  Adult Oral Nutrition Supplement; Fortified Pudding Oral Supplement  Adult Oral Nutrition Supplement; Frozen Oral Supplement   DVT Prophylaxis [x] Coumadin   GI Prophylaxis [] PPI,  [x] H2 Blocker,  [] Carafate,  [] Diet/Tube Feeds   Code Status Full Code   Disposition Patient requires continued admission due to Acute UTI   MDM [] Low, [] Moderate,[x]  High     History of Present Illness:     Chief Complaint: Acute encephalopathy  Imtiaz Santoyo is a 78 y.o.  female  who presents with UTI & metabolic encephalopathy. Seen at bedside with patient's family, patient appears more awake and alert today, closer to baseline, however still is significantly weaker than baseline     Ten point ROS reviewed negative, unless as noted above    Objective:     No intake or output data in the 24 hours ending 10/06/21 2029   Vitals:   Vitals:    10/06/21 1450   BP: 137/85   Pulse: 65   Resp: 14   Temp: 97.7 °F (36.5 °C)   SpO2: 100%     Physical Exam:   GEN  female, somnolent lying in bed, Appears given age. EYES Pupils are equally round. No scleral erythema, discharge, or conjunctivitis. HENT Mucous membranes are moist. Oral pharynx without exudates, no evidence of thrush. RESP Clear to auscultation, no wheezes, rales or rhonchi. Symmetric chest movement while on room air. CARDIO/VASC S1/S2 auscultated. Regular rate without appreciable murmurs, rubs, or gallops.    GI Abdomen is soft without significant tenderness, masses, or guarding. MSK No gross joint deformities. SKIN Normal coloration, warm, dry. NEURO Moves all four extremities, weaker on left side baseline per family  PSYCH Awake, alert, oriented x 1. Affect appropriate.     Medications:   Medications:    lactulose  20 g Oral Once    sulfamethoxazole-trimethoprim  1 tablet Oral 2 times per day    warfarin  1.5 mg Oral Daily    carbidopa-levodopa  1 tablet Oral Daily    digoxin  125 mcg Oral Daily    carbidopa-levodopa  2 tablet Oral BID    amantadine  100 mg Oral Nightly    vitamin D  1 capsule Oral Daily    folic acid  1 mg Oral Daily    levothyroxine  50 mcg Oral Daily    metFORMIN  500 mg Oral Dinner    vitamin B-12  1,000 mcg Oral Every Other Day    sodium chloride flush  10 mL IntraVENous 2 times per day    insulin lispro  0-6 Units SubCUTAneous TID WC    insulin lispro  0-3 Units SubCUTAneous Nightly    famotidine  20 mg Oral BID    dilTIAZem  60 mg Oral 3 times per day      Infusions:    sodium chloride 40 mL/hr at 10/05/21 0412    sodium chloride      dextrose       PRN Meds: phenol, 1 spray, Q2H PRN  calcium carbonate, 500 mg, TID PRN  GI cocktail, , Once PRN  melatonin, 3 mg, Nightly PRN  simethicone, 80 mg, 4x Daily PRN  albuterol sulfate HFA, 2 puff, Q6H PRN  sodium chloride flush, 10 mL, PRN  sodium chloride, 25 mL, PRN  potassium chloride, 10 mEq, PRN  magnesium sulfate, 1,000 mg, PRN  ondansetron, 4 mg, Q8H PRN   Or  ondansetron, 4 mg, Q6H PRN  polyethylene glycol, 17 g, Daily PRN  acetaminophen, 650 mg, Q6H PRN   Or  acetaminophen, 650 mg, Q6H PRN  traMADol, 50 mg, Q6H PRN  glucose, 15 g, PRN  dextrose, 12.5 g, PRN  glucagon (rDNA), 1 mg, PRN  dextrose, 100 mL/hr, PRN  baclofen, 5 mg, Q4H PRN          Electronically signed by Dalton Titus MD on 10/6/2021 at 8:29 PM

## 2021-10-07 NOTE — PROGRESS NOTES
Hospitalist Progress Note      Name:  Derrick [facility] /Age/Sex: 1940  (78 y.o. female)   MRN & CSN:  [id number] & [id number] Admission Date/Time: 2021 11:26 AM   Location:  [address] [place] [address] PCP: Dana Rao MD         Hospital Day: 9    Assessment and Plan:   Rochelle Carrillo is a 78 y.o.  female with past medical history of Parkinson's disease who was admitted for A. fib with rapid ventricular response, confusion and found to have urinary tract infection. Urine culture came back positive for Klebsiella. Patient remains confused. Patient daughter was concerned about abnormal extension of a tissue from her femalegenitalia and wanted OB/GYN to see patient. OB/GYN consult pending. 1.         Paroxysmal atrial fibrillation with rapid ventricular response: Rate controlled  2. Urinary tract infection with urine culture being positive for Klebsiella  3. Metabolic encephalopathy & hospital delirium  4. History of Parkinson's disease  5. Other medical problem include diabetes, hypothyroidism  6. Abnormal skin extension from genitalia  7. Hypomagnesemia resolved  8. Maculopapular Rash--->allergic reaction    Plan:  Rash likely secondary to ?bactrim, will dc, add benadryl   AMS improving today, patient more awake and alert   Family states patient reporting throat burning--->add spray, acid reflux meds-->resolved  Delirium--patient oscillating btwn hyperactive and hypoactive delirium   Will take mental status time to recover after hospitalization  Consider palliative care for patient, maybe even on outpatient basis   Likely will need increased services on discharge if family refusing SNF placement  Patient titrated off cefepime--may be worsening confusion    Continue IVF to half maintenance. LE edema remains resolved. Encourage PO intake as toleated  Continue Digoxin 125mcg & Diltiazem 60mg q8hrs. INR 2.5 Therapeutic on Coumadin.   Culture came back positive for Klebsiella: Continue Cefepime cx sensitive  Procal 0.073, CRP 87 down trending  Patient's Parkinson disease regimen is adjusted by neurology who has signed off. Per Neuro  · Continue Sinemet 25/100 mg 2 tablets three times daily to 2 tablets in the morning, 1 tablet mid day and 2 tablets at night  OB/GYN consult pending for an abnormal skin growth from patient's genitalia. PT/OT eval and treat  Continue patients home medications as indicated for her chronic medical conditions    Diet Adult Oral Nutrition Supplement; Fortified Pudding Oral Supplement  Adult Oral Nutrition Supplement; Frozen Oral Supplement  ADULT DIET; Dysphagia - Minced and Moist; Mildly Thick (Nectar)   DVT Prophylaxis [x] Coumadin   GI Prophylaxis [] PPI,  [x] H2 Blocker,  [] Carafate,  [] Diet/Tube Feeds   Code Status Full Code   Disposition Patient requires continued admission due to Acute UTI   MDM [] Low, [] Moderate,[x]  High     History of Present Illness:     Chief Complaint: Acute encephalopathy  Nash Mathias is a 78 y.o.  female  who presents with UTI & metabolic encephalopathy. Seen at bedside with patient's family, more awake and alert today sitting up in chair. Rash developed overnight, used new soap took shower, ?bactrim allergy, daughter reports patient has used bactrim without any issues in the past     Ten point ROS reviewed negative, unless as noted above    Objective:     No intake or output data in the 24 hours ending 10/07/21 1927   Vitals:   Vitals:    10/07/21 1535   BP:    Pulse:    Resp:    Temp:    SpO2: 92%     Physical Exam:   GEN  female, somnolent lying in bed, Appears given age. EYES Pupils are equally round. No scleral erythema, discharge, or conjunctivitis. HENT Mucous membranes are moist. Oral pharynx without exudates, no evidence of thrush. RESP Clear to auscultation, no wheezes, rales or rhonchi. Symmetric chest movement while on room air. CARDIO/VASC S1/S2 auscultated. Regular rate without appreciable murmurs, rubs, or gallops. GI Abdomen is soft without significant tenderness, masses, or guarding. MSK No gross joint deformities. SKIN Normal coloration, warm, dry. NEURO Moves all four extremities, weaker on left side baseline per family  PSYCH Awake, alert, oriented x 1. Affect appropriate.     Medications:   Medications:    warfarin  1.5 mg Oral Daily    carbidopa-levodopa  1 tablet Oral Daily    digoxin  125 mcg Oral Daily    carbidopa-levodopa  2 tablet Oral BID    amantadine  100 mg Oral Nightly    vitamin D  1 capsule Oral Daily    folic acid  1 mg Oral Daily    levothyroxine  50 mcg Oral Daily    metFORMIN  500 mg Oral Dinner    vitamin B-12  1,000 mcg Oral Every Other Day    sodium chloride flush  10 mL IntraVENous 2 times per day    insulin lispro  0-6 Units SubCUTAneous TID WC    insulin lispro  0-3 Units SubCUTAneous Nightly    famotidine  20 mg Oral BID    dilTIAZem  60 mg Oral 3 times per day      Infusions:    sodium chloride 40 mL/hr at 10/05/21 0412    sodium chloride      dextrose       PRN Meds: diphenhydrAMINE, 25 mg, Q6H PRN  phenol, 1 spray, Q2H PRN  calcium carbonate, 500 mg, TID PRN  melatonin, 3 mg, Nightly PRN  simethicone, 80 mg, 4x Daily PRN  albuterol sulfate HFA, 2 puff, Q6H PRN  sodium chloride flush, 10 mL, PRN  sodium chloride, 25 mL, PRN  potassium chloride, 10 mEq, PRN  magnesium sulfate, 1,000 mg, PRN  ondansetron, 4 mg, Q8H PRN   Or  ondansetron, 4 mg, Q6H PRN  polyethylene glycol, 17 g, Daily PRN  acetaminophen, 650 mg, Q6H PRN   Or  acetaminophen, 650 mg, Q6H PRN  traMADol, 50 mg, Q6H PRN  glucose, 15 g, PRN  dextrose, 12.5 g, PRN  glucagon (rDNA), 1 mg, PRN  dextrose, 100 mL/hr, PRN  baclofen, 5 mg, Q4H PRN          Electronically signed by Humera Marie MD on 10/7/2021 at 7:27 PM

## 2021-10-08 LAB
ANION GAP SERPL CALCULATED.3IONS-SCNC: 10 MMOL/L (ref 4–16)
BASOPHILS ABSOLUTE: 0.1 K/CU MM
BASOPHILS RELATIVE PERCENT: 0.8 % (ref 0–1)
BUN BLDV-MCNC: 18 MG/DL (ref 6–23)
CALCIUM SERPL-MCNC: 8.8 MG/DL (ref 8.3–10.6)
CHLORIDE BLD-SCNC: 106 MMOL/L (ref 99–110)
CO2: 23 MMOL/L (ref 21–32)
CREAT SERPL-MCNC: 0.8 MG/DL (ref 0.6–1.1)
DIFFERENTIAL TYPE: ABNORMAL
EOSINOPHILS ABSOLUTE: 0.3 K/CU MM
EOSINOPHILS RELATIVE PERCENT: 2.9 % (ref 0–3)
GFR AFRICAN AMERICAN: >60 ML/MIN/1.73M2
GFR NON-AFRICAN AMERICAN: >60 ML/MIN/1.73M2
GLUCOSE BLD-MCNC: 114 MG/DL (ref 70–99)
GLUCOSE BLD-MCNC: 129 MG/DL (ref 70–99)
GLUCOSE BLD-MCNC: 133 MG/DL (ref 70–99)
GLUCOSE BLD-MCNC: 154 MG/DL (ref 70–99)
GLUCOSE BLD-MCNC: 163 MG/DL (ref 70–99)
HCT VFR BLD CALC: 38.3 % (ref 37–47)
HEMOGLOBIN: 12.1 GM/DL (ref 12.5–16)
HIGH SENSITIVE C-REACTIVE PROTEIN: 50.2 MG/L
IMMATURE NEUTROPHIL %: 0.8 % (ref 0–0.43)
INR BLD: 3.1 INDEX
LYMPHOCYTES ABSOLUTE: 1.1 K/CU MM
LYMPHOCYTES RELATIVE PERCENT: 11.4 % (ref 24–44)
MCH RBC QN AUTO: 33.2 PG (ref 27–31)
MCHC RBC AUTO-ENTMCNC: 31.6 % (ref 32–36)
MCV RBC AUTO: 104.9 FL (ref 78–100)
MONOCYTES ABSOLUTE: 0.9 K/CU MM
MONOCYTES RELATIVE PERCENT: 9.1 % (ref 0–4)
NUCLEATED RBC %: 0 %
PDW BLD-RTO: 12.7 % (ref 11.7–14.9)
PLATELET # BLD: 278 K/CU MM (ref 140–440)
PMV BLD AUTO: 9.8 FL (ref 7.5–11.1)
POTASSIUM SERPL-SCNC: 3.8 MMOL/L (ref 3.5–5.1)
PROTHROMBIN TIME: 40.4 SECONDS (ref 11.7–14.5)
RBC # BLD: 3.65 M/CU MM (ref 4.2–5.4)
SEGMENTED NEUTROPHILS ABSOLUTE COUNT: 7.5 K/CU MM
SEGMENTED NEUTROPHILS RELATIVE PERCENT: 75 % (ref 36–66)
SODIUM BLD-SCNC: 139 MMOL/L (ref 135–145)
TOTAL IMMATURE NEUTOROPHIL: 0.08 K/CU MM
TOTAL NUCLEATED RBC: 0 K/CU MM
WBC # BLD: 10 K/CU MM (ref 4–10.5)

## 2021-10-08 PROCEDURE — 36415 COLL VENOUS BLD VENIPUNCTURE: CPT

## 2021-10-08 PROCEDURE — 6370000000 HC RX 637 (ALT 250 FOR IP): Performed by: HOSPITALIST

## 2021-10-08 PROCEDURE — 84145 PROCALCITONIN (PCT): CPT

## 2021-10-08 PROCEDURE — 85610 PROTHROMBIN TIME: CPT

## 2021-10-08 PROCEDURE — 6370000000 HC RX 637 (ALT 250 FOR IP): Performed by: INTERNAL MEDICINE

## 2021-10-08 PROCEDURE — 92526 ORAL FUNCTION THERAPY: CPT

## 2021-10-08 PROCEDURE — 1200000000 HC SEMI PRIVATE

## 2021-10-08 PROCEDURE — 94761 N-INVAS EAR/PLS OXIMETRY MLT: CPT

## 2021-10-08 PROCEDURE — 85025 COMPLETE CBC W/AUTO DIFF WBC: CPT

## 2021-10-08 PROCEDURE — 76937 US GUIDE VASCULAR ACCESS: CPT

## 2021-10-08 PROCEDURE — 6370000000 HC RX 637 (ALT 250 FOR IP): Performed by: NURSE PRACTITIONER

## 2021-10-08 PROCEDURE — 6370000000 HC RX 637 (ALT 250 FOR IP): Performed by: PHYSICIAN ASSISTANT

## 2021-10-08 PROCEDURE — 86141 C-REACTIVE PROTEIN HS: CPT

## 2021-10-08 PROCEDURE — 6360000002 HC RX W HCPCS: Performed by: HOSPITALIST

## 2021-10-08 PROCEDURE — 2580000003 HC RX 258: Performed by: INTERNAL MEDICINE

## 2021-10-08 PROCEDURE — 82962 GLUCOSE BLOOD TEST: CPT

## 2021-10-08 PROCEDURE — 80048 BASIC METABOLIC PNL TOTAL CA: CPT

## 2021-10-08 RX ORDER — SENNA AND DOCUSATE SODIUM 50; 8.6 MG/1; MG/1
2 TABLET, FILM COATED ORAL NIGHTLY
Status: DISCONTINUED | OUTPATIENT
Start: 2021-10-08 | End: 2021-10-13 | Stop reason: HOSPADM

## 2021-10-08 RX ORDER — METHYLPREDNISOLONE SODIUM SUCCINATE 40 MG/ML
40 INJECTION, POWDER, LYOPHILIZED, FOR SOLUTION INTRAMUSCULAR; INTRAVENOUS EVERY 12 HOURS
Status: DISCONTINUED | OUTPATIENT
Start: 2021-10-08 | End: 2021-10-10

## 2021-10-08 RX ORDER — LACTULOSE 10 G/15ML
20 SOLUTION ORAL 2 TIMES DAILY
Status: DISCONTINUED | OUTPATIENT
Start: 2021-10-08 | End: 2021-10-13 | Stop reason: HOSPADM

## 2021-10-08 RX ORDER — WARFARIN SODIUM 1 MG/1
1 TABLET ORAL DAILY
Status: DISCONTINUED | OUTPATIENT
Start: 2021-10-09 | End: 2021-10-10

## 2021-10-08 RX ORDER — LANOLIN ALCOHOL/MO/W.PET/CERES
6 CREAM (GRAM) TOPICAL NIGHTLY PRN
Status: DISCONTINUED | OUTPATIENT
Start: 2021-10-08 | End: 2021-10-13 | Stop reason: HOSPADM

## 2021-10-08 RX ORDER — POLYETHYLENE GLYCOL 3350 17 G/17G
17 POWDER, FOR SOLUTION ORAL DAILY
Status: DISCONTINUED | OUTPATIENT
Start: 2021-10-09 | End: 2021-10-13 | Stop reason: HOSPADM

## 2021-10-08 RX ADMIN — CARBIDOPA AND LEVODOPA 1 TABLET: 25; 100 TABLET ORAL at 15:42

## 2021-10-08 RX ADMIN — DIPHENHYDRAMINE HYDROCHLORIDE 25 MG: 50 INJECTION INTRAMUSCULAR; INTRAVENOUS at 05:01

## 2021-10-08 RX ADMIN — SODIUM CHLORIDE, PRESERVATIVE FREE 10 ML: 5 INJECTION INTRAVENOUS at 10:29

## 2021-10-08 RX ADMIN — CARBIDOPA AND LEVODOPA 2 TABLET: 25; 100 TABLET ORAL at 21:44

## 2021-10-08 RX ADMIN — DILTIAZEM HYDROCHLORIDE 60 MG: 60 TABLET, FILM COATED ORAL at 15:42

## 2021-10-08 RX ADMIN — DILTIAZEM HYDROCHLORIDE 60 MG: 60 TABLET, FILM COATED ORAL at 21:44

## 2021-10-08 RX ADMIN — ACETAMINOPHEN 650 MG: 325 TABLET ORAL at 21:53

## 2021-10-08 RX ADMIN — LEVOTHYROXINE SODIUM 50 MCG: 0.05 TABLET ORAL at 07:37

## 2021-10-08 RX ADMIN — AMANTADINE HYDROCHLORIDE 100 MG: 100 CAPSULE, LIQUID FILLED ORAL at 21:52

## 2021-10-08 RX ADMIN — FOLIC ACID 1 MG: 1 TABLET ORAL at 10:27

## 2021-10-08 RX ADMIN — DILTIAZEM HYDROCHLORIDE 60 MG: 60 TABLET, FILM COATED ORAL at 05:01

## 2021-10-08 RX ADMIN — FAMOTIDINE 20 MG: 20 TABLET ORAL at 10:28

## 2021-10-08 RX ADMIN — ACETAMINOPHEN 650 MG: 325 TABLET ORAL at 10:28

## 2021-10-08 RX ADMIN — SENNOSIDES AND DOCUSATE SODIUM 2 TABLET: 50; 8.6 TABLET ORAL at 21:44

## 2021-10-08 RX ADMIN — Medication 6 MG: at 21:52

## 2021-10-08 RX ADMIN — DIGOXIN 125 MCG: 125 TABLET ORAL at 10:27

## 2021-10-08 RX ADMIN — FAMOTIDINE 20 MG: 20 TABLET ORAL at 21:45

## 2021-10-08 RX ADMIN — METHYLPREDNISOLONE SODIUM SUCCINATE 40 MG: 40 INJECTION, POWDER, FOR SOLUTION INTRAMUSCULAR; INTRAVENOUS at 17:58

## 2021-10-08 RX ADMIN — CARBIDOPA AND LEVODOPA 2 TABLET: 25; 100 TABLET ORAL at 10:27

## 2021-10-08 RX ADMIN — LACTULOSE 20 G: 10 SOLUTION ORAL at 21:44

## 2021-10-08 RX ADMIN — METFORMIN HYDROCHLORIDE 500 MG: 500 TABLET ORAL at 17:58

## 2021-10-08 RX ADMIN — Medication 1000 UNITS: at 10:28

## 2021-10-08 ASSESSMENT — PAIN SCALES - GENERAL
PAINLEVEL_OUTOF10: 2
PAINLEVEL_OUTOF10: 4
PAINLEVEL_OUTOF10: 2

## 2021-10-08 NOTE — PROGRESS NOTES
Hospitalist Progress Note      Name:  [facility] /Age/Sex: 1940  (78 y.o. female)   MRN & CSN:  [id number] & [id number] Admission Date/Time: 2021 11:26 AM   Location:  [address] [place] [address] PCP: Andrew Kern MD         Hospital Day: 10    Assessment and Plan:   Edda De Los Santos is a 78 y.o.  female with past medical history of Parkinson's disease who was admitted for A. fib with rapid ventricular response, confusion and found to have urinary tract infection. Urine culture came back positive for Klebsiella. Patient remains confused. Patient daughter was concerned about abnormal extension of a tissue from her femalegenitalia and wanted OB/GYN to see patient. OB/GYN consult pending. 1.         Paroxysmal atrial fibrillation with rapid ventricular response: Rate controlled  2. Urinary tract infection with urine culture being positive for Klebsiella  3. Metabolic encephalopathy & hospital delerium  4. History of Parkinson's disease  5. Other medical problem include diabetes, hypothyroidism  6. Abnormal skin extension from genitalia  7. Hypomagnesemia resolved    Plan:  AMS improving today, patient more awake and alert   Family states patient reporting throat burning--->add spray, acid reflux meds   --Speech and PT to follow with patient and family tomorrow   Delirium--patient oscillating btwn hyperactive and hypoactive delirium   Will take mental status time to recover after hospitalization  Consider palliative care for patient, maybe even on outpatient basis   Likely will need increased services on discharge if family refusing SNF placement  Patient titrated off cefepime--may be worsening confusion    Continue IVF to half maintenance. LE edema remains resolved. Encourage PO intake as toleated  Continue Digoxin 125mcg & Diltiazem 60mg q8hrs. INR 2.5 Therapeutic on Coumadin.   Culture came back positive for Klebsiella: Continue Cefepime cx sensitive  Procal 0.073, CRP 87 down trending  Patient's Parkinson disease regimen is adjusted by neurology who has signed off. Per Neuro  · Continue Sinemet 25/100 mg 2 tablets three times daily to 2 tablets in the morning, 1 tablet mid day and 2 tablets at night  OB/GYN consult pending for an abnormal skin growth from patient's genitalia. PT/OT eval and treat  Continue patients home medications as indicated for her chronic medical conditions    Diet Adult Oral Nutrition Supplement; Fortified Pudding Oral Supplement  Adult Oral Nutrition Supplement; Frozen Oral Supplement  ADULT DIET; Dysphagia - Soft and Bite Sized   DVT Prophylaxis [x] Coumadin   GI Prophylaxis [] PPI,  [x] H2 Blocker,  [] Carafate,  [] Diet/Tube Feeds   Code Status Full Code   Disposition Patient requires continued admission due to Acute UTI   MDM [] Low, [] Moderate,[x]  High     History of Present Illness:     Chief Complaint: Acute encephalopathy  Imtiaz Santoyo is a 78 y.o.  female  who presents with UTI & metabolic encephalopathy. Seen at bedside with patient's family, patient appears more awake and alert today, closer to baseline, however still is significantly weaker than baseline     Ten point ROS reviewed negative, unless as noted above    Objective:     No intake or output data in the 24 hours ending 10/08/21 1854   Vitals:   Vitals:    10/08/21 0757   BP: (!) 149/73   Pulse: 77   Resp: 16   Temp: 97.9 °F (36.6 °C)   SpO2: 90%     Physical Exam:   GEN  female, somnolent lying in bed, Appears given age. EYES Pupils are equally round. No scleral erythema, discharge, or conjunctivitis. HENT Mucous membranes are moist. Oral pharynx without exudates, no evidence of thrush. RESP Clear to auscultation, no wheezes, rales or rhonchi. Symmetric chest movement while on room air. CARDIO/VASC S1/S2 auscultated. Regular rate without appreciable murmurs, rubs, or gallops.    GI Abdomen is soft without significant tenderness, masses, or guarding. MSK No gross joint deformities. SKIN Normal coloration, warm, dry. NEURO Moves all four extremities, weaker on left side baseline per family  PSYCH Awake, alert, oriented x 1. Affect appropriate.     Medications:   Medications:    [START ON 10/9/2021] warfarin  1 mg Oral Daily    methylPREDNISolone  40 mg IntraVENous Q12H    sennosides-docusate sodium  2 tablet Oral Nightly    [START ON 10/9/2021] polyethylene glycol  17 g Oral Daily    lactulose  20 g Oral BID    carbidopa-levodopa  1 tablet Oral Daily    digoxin  125 mcg Oral Daily    carbidopa-levodopa  2 tablet Oral BID    amantadine  100 mg Oral Nightly    vitamin D  1 capsule Oral Daily    folic acid  1 mg Oral Daily    levothyroxine  50 mcg Oral Daily    metFORMIN  500 mg Oral Dinner    vitamin B-12  1,000 mcg Oral Every Other Day    sodium chloride flush  10 mL IntraVENous 2 times per day    insulin lispro  0-6 Units SubCUTAneous TID WC    insulin lispro  0-3 Units SubCUTAneous Nightly    famotidine  20 mg Oral BID    dilTIAZem  60 mg Oral 3 times per day      Infusions:    sodium chloride 40 mL/hr at 10/05/21 0412    sodium chloride      dextrose       PRN Meds: melatonin, 6 mg, Nightly PRN  phenol, 1 spray, Q2H PRN  calcium carbonate, 500 mg, TID PRN  simethicone, 80 mg, 4x Daily PRN  albuterol sulfate HFA, 2 puff, Q6H PRN  sodium chloride flush, 10 mL, PRN  sodium chloride, 25 mL, PRN  potassium chloride, 10 mEq, PRN  magnesium sulfate, 1,000 mg, PRN  ondansetron, 4 mg, Q8H PRN   Or  ondansetron, 4 mg, Q6H PRN  polyethylene glycol, 17 g, Daily PRN  acetaminophen, 650 mg, Q6H PRN   Or  acetaminophen, 650 mg, Q6H PRN  traMADol, 50 mg, Q6H PRN  glucose, 15 g, PRN  dextrose, 12.5 g, PRN  glucagon (rDNA), 1 mg, PRN  dextrose, 100 mL/hr, PRN  baclofen, 5 mg, Q4H PRN          Electronically signed by Shi Carey MD on 10/8/2021 at 6:54 PM

## 2021-10-08 NOTE — CONSULTS
Consult completed. Nexiva 22g 1.75 inch catheter inserted via ultrasound in patient's RFA. Brisk blood return noted and catheter flushes with ease. PIV in [address] DC's d/t redness and swelling @ site. Dressing applied. Patient tolerated well. Consult IV/PICC team if patient's needs change.

## 2021-10-08 NOTE — PROGRESS NOTES
[address] is a 78 y.o. female on warfarin therapy for Afib. Pharmacy consulted by Dr. Jenniffer Harvey for monitoring and adjustment of treatment. Indication for anticoagulation: Afib  INR goal: 2-3  Warfarin dose prior to admission: 2mg,1mg alternating days     Pertinent Laboratory Values   Recent Labs     10/06/21  0744 10/06/21  0744 10/07/21  0802 10/07/21  0802 10/08/21  0803   INR 2.84   < > 2.82   < > 3.10   HGB 10.8*   < > 11.3*   < > 12.1*   HCT 33.8*   < > 35.3*   < > 38.3     --  260  --  278    < > = values in this interval not displayed. INR MONITORING  Recent Labs     10/06/21  0744 10/07/21  0802 10/08/21  0803   INR 2.84 2.82 3.10     Assessment/Plan:   Drug Interactions:   Home meds:  Levothyroxine, metformin   Cefepime and Bactrim discontinued.  INR slowly trending up to 3.10 this am after 4 days of 1.5mg doses.  Will hold tonight's warfarin dose and restart at 1mg daily tomorrow night, then follow INR trends   Pharmacy will continue to monitor and adjust warfarin therapy as indicated    Thank you for the consult. Shiela Dale [facility]  10/8/2021 3:09 PM

## 2021-10-08 NOTE — CARE COORDINATION
Cm called [employer] and they have not received the orders for the abram lift. Cm re-faxed orders to Priya Nina phone [phone] fax [phone]. Cm following. 1200  CM confirmed that the order was received and they are working on it. 1450  Cm called and left a voice mail for Adal Machado to check on abram lift at family request.    0194  Received call from [facility] and they have set up delivery for Monday. Cm called daughter Rosanna Feng and left  with update.

## 2021-10-09 LAB
ANION GAP SERPL CALCULATED.3IONS-SCNC: 11 MMOL/L (ref 4–16)
BASOPHILS ABSOLUTE: 0 K/CU MM
BASOPHILS RELATIVE PERCENT: 0.4 % (ref 0–1)
BUN BLDV-MCNC: 17 MG/DL (ref 6–23)
CALCIUM SERPL-MCNC: 8.8 MG/DL (ref 8.3–10.6)
CHLORIDE BLD-SCNC: 106 MMOL/L (ref 99–110)
CO2: 21 MMOL/L (ref 21–32)
CREAT SERPL-MCNC: 0.7 MG/DL (ref 0.6–1.1)
DIFFERENTIAL TYPE: ABNORMAL
EKG ATRIAL RATE: 75 BPM
EKG DIAGNOSIS: NORMAL
EKG P AXIS: 74 DEGREES
EKG P-R INTERVAL: 212 MS
EKG Q-T INTERVAL: 380 MS
EKG QRS DURATION: 90 MS
EKG QTC CALCULATION (BAZETT): 424 MS
EKG R AXIS: -18 DEGREES
EKG T AXIS: 16 DEGREES
EKG VENTRICULAR RATE: 75 BPM
EOSINOPHILS ABSOLUTE: 0 K/CU MM
EOSINOPHILS RELATIVE PERCENT: 0 % (ref 0–3)
GFR AFRICAN AMERICAN: >60 ML/MIN/1.73M2
GFR NON-AFRICAN AMERICAN: >60 ML/MIN/1.73M2
GLUCOSE BLD-MCNC: 170 MG/DL (ref 70–99)
GLUCOSE BLD-MCNC: 183 MG/DL (ref 70–99)
GLUCOSE BLD-MCNC: 193 MG/DL (ref 70–99)
GLUCOSE BLD-MCNC: 203 MG/DL (ref 70–99)
GLUCOSE BLD-MCNC: 225 MG/DL (ref 70–99)
HCT VFR BLD CALC: 37 % (ref 37–47)
HEMOGLOBIN: 11.9 GM/DL (ref 12.5–16)
IMMATURE NEUTROPHIL %: 1 % (ref 0–0.43)
INR BLD: 2.94 INDEX
LYMPHOCYTES ABSOLUTE: 0.7 K/CU MM
LYMPHOCYTES RELATIVE PERCENT: 7 % (ref 24–44)
MCH RBC QN AUTO: 32.4 PG (ref 27–31)
MCHC RBC AUTO-ENTMCNC: 32.2 % (ref 32–36)
MCV RBC AUTO: 100.8 FL (ref 78–100)
MONOCYTES ABSOLUTE: 0.1 K/CU MM
MONOCYTES RELATIVE PERCENT: 0.9 % (ref 0–4)
NUCLEATED RBC %: 0 %
PDW BLD-RTO: 12.5 % (ref 11.7–14.9)
PLATELET # BLD: 287 K/CU MM (ref 140–440)
PMV BLD AUTO: 9.9 FL (ref 7.5–11.1)
POTASSIUM SERPL-SCNC: 4.5 MMOL/L (ref 3.5–5.1)
PROTHROMBIN TIME: 38.3 SECONDS (ref 11.7–14.5)
RBC # BLD: 3.67 M/CU MM (ref 4.2–5.4)
SEGMENTED NEUTROPHILS ABSOLUTE COUNT: 8.4 K/CU MM
SEGMENTED NEUTROPHILS RELATIVE PERCENT: 90.7 % (ref 36–66)
SODIUM BLD-SCNC: 138 MMOL/L (ref 135–145)
TOTAL IMMATURE NEUTOROPHIL: 0.09 K/CU MM
TOTAL NUCLEATED RBC: 0 K/CU MM
WBC # BLD: 9.3 K/CU MM (ref 4–10.5)

## 2021-10-09 PROCEDURE — 93005 ELECTROCARDIOGRAM TRACING: CPT | Performed by: INTERNAL MEDICINE

## 2021-10-09 PROCEDURE — 82962 GLUCOSE BLOOD TEST: CPT

## 2021-10-09 PROCEDURE — 2580000003 HC RX 258: Performed by: INTERNAL MEDICINE

## 2021-10-09 PROCEDURE — 6370000000 HC RX 637 (ALT 250 FOR IP): Performed by: PHYSICIAN ASSISTANT

## 2021-10-09 PROCEDURE — 6370000000 HC RX 637 (ALT 250 FOR IP): Performed by: NURSE PRACTITIONER

## 2021-10-09 PROCEDURE — 93010 ELECTROCARDIOGRAM REPORT: CPT | Performed by: INTERNAL MEDICINE

## 2021-10-09 PROCEDURE — 6370000000 HC RX 637 (ALT 250 FOR IP): Performed by: INTERNAL MEDICINE

## 2021-10-09 PROCEDURE — 94761 N-INVAS EAR/PLS OXIMETRY MLT: CPT

## 2021-10-09 PROCEDURE — 1200000000 HC SEMI PRIVATE

## 2021-10-09 PROCEDURE — 99223 1ST HOSP IP/OBS HIGH 75: CPT | Performed by: INTERNAL MEDICINE

## 2021-10-09 PROCEDURE — 85025 COMPLETE CBC W/AUTO DIFF WBC: CPT

## 2021-10-09 PROCEDURE — 6360000002 HC RX W HCPCS: Performed by: HOSPITALIST

## 2021-10-09 PROCEDURE — 80048 BASIC METABOLIC PNL TOTAL CA: CPT

## 2021-10-09 PROCEDURE — 6370000000 HC RX 637 (ALT 250 FOR IP): Performed by: HOSPITALIST

## 2021-10-09 PROCEDURE — 36415 COLL VENOUS BLD VENIPUNCTURE: CPT

## 2021-10-09 PROCEDURE — 85610 PROTHROMBIN TIME: CPT

## 2021-10-09 RX ORDER — METOPROLOL TARTRATE 50 MG/1
50 TABLET, FILM COATED ORAL 2 TIMES DAILY
Status: DISCONTINUED | OUTPATIENT
Start: 2021-10-09 | End: 2021-10-10

## 2021-10-09 RX ADMIN — DIGOXIN 125 MCG: 125 TABLET ORAL at 09:18

## 2021-10-09 RX ADMIN — METOPROLOL TARTRATE 50 MG: 50 TABLET, FILM COATED ORAL at 14:28

## 2021-10-09 RX ADMIN — Medication 1000 UNITS: at 09:19

## 2021-10-09 RX ADMIN — LACTULOSE 20 G: 10 SOLUTION ORAL at 09:18

## 2021-10-09 RX ADMIN — WARFARIN SODIUM 1 MG: 1 TABLET ORAL at 17:50

## 2021-10-09 RX ADMIN — ACETAMINOPHEN 650 MG: 325 TABLET ORAL at 09:19

## 2021-10-09 RX ADMIN — METFORMIN HYDROCHLORIDE 500 MG: 500 TABLET ORAL at 17:49

## 2021-10-09 RX ADMIN — CARBIDOPA AND LEVODOPA 1 TABLET: 25; 100 TABLET ORAL at 14:28

## 2021-10-09 RX ADMIN — SODIUM CHLORIDE, PRESERVATIVE FREE 10 ML: 5 INJECTION INTRAVENOUS at 21:42

## 2021-10-09 RX ADMIN — FAMOTIDINE 20 MG: 20 TABLET ORAL at 09:18

## 2021-10-09 RX ADMIN — SENNOSIDES AND DOCUSATE SODIUM 2 TABLET: 50; 8.6 TABLET ORAL at 21:41

## 2021-10-09 RX ADMIN — METOPROLOL TARTRATE 50 MG: 50 TABLET, FILM COATED ORAL at 21:41

## 2021-10-09 RX ADMIN — LACTULOSE 20 G: 10 SOLUTION ORAL at 21:42

## 2021-10-09 RX ADMIN — CYANOCOBALAMIN TAB 1000 MCG 1000 MCG: 1000 TAB at 09:19

## 2021-10-09 RX ADMIN — METHYLPREDNISOLONE SODIUM SUCCINATE 40 MG: 40 INJECTION, POWDER, FOR SOLUTION INTRAMUSCULAR; INTRAVENOUS at 17:50

## 2021-10-09 RX ADMIN — FOLIC ACID 1 MG: 1 TABLET ORAL at 09:19

## 2021-10-09 RX ADMIN — METHYLPREDNISOLONE SODIUM SUCCINATE 40 MG: 40 INJECTION, POWDER, FOR SOLUTION INTRAMUSCULAR; INTRAVENOUS at 07:14

## 2021-10-09 RX ADMIN — LEVOTHYROXINE SODIUM 50 MCG: 0.05 TABLET ORAL at 07:14

## 2021-10-09 RX ADMIN — ACETAMINOPHEN 650 MG: 325 TABLET ORAL at 21:41

## 2021-10-09 RX ADMIN — CARBIDOPA AND LEVODOPA 2 TABLET: 25; 100 TABLET ORAL at 09:18

## 2021-10-09 RX ADMIN — CARBIDOPA AND LEVODOPA 2 TABLET: 25; 100 TABLET ORAL at 21:41

## 2021-10-09 RX ADMIN — FAMOTIDINE 20 MG: 20 TABLET ORAL at 21:41

## 2021-10-09 RX ADMIN — DILTIAZEM HYDROCHLORIDE 60 MG: 60 TABLET, FILM COATED ORAL at 07:14

## 2021-10-09 RX ADMIN — AMANTADINE HYDROCHLORIDE 100 MG: 100 CAPSULE, LIQUID FILLED ORAL at 21:46

## 2021-10-09 RX ADMIN — SODIUM CHLORIDE, PRESERVATIVE FREE 10 ML: 5 INJECTION INTRAVENOUS at 09:26

## 2021-10-09 ASSESSMENT — PAIN SCALES - GENERAL
PAINLEVEL_OUTOF10: 2
PAINLEVEL_OUTOF10: 3

## 2021-10-09 NOTE — CONSULTS
CARDIOLOGY CONSULT NOTE   Reason for consultation:  afib    Referring physician:  No admitting provider for patient encounter. Primary care physician: Claudia Bacon MD      Dear  Dr. Jarret Rose admitting provider for patient encounter. Thanks for the consult. Chief Complaints :  Chief Complaint   Patient presents with    Irregular Heart Beat     A.fib RVR        History of present illness:Heavenly is a 78 y. o.year old who has been in hospital for few days now she was actually admitted initially for A. fib in the setting of urinary tract infection she has chronic parkinsonism she has been on Coumadin after discussion with EPS service decision was made to continue rate control strategy she was started on digoxin and Cardizem. In the last 48 hours she developed a generalized rash which is nonblanching all over her body after a dose of Bactrim. Cardiology was asked to reevaluate her due to concerns for possible Cardizem or digoxin being the etiology of her rash. She is on Coumadin  Echo shows preserved EF with LVH and moderate mitral regurgitation her blood pressure is elevated. Most of the information is provided by her daughter as patient is somewhat delirious    Past medical history:    has a past medical history of Atrial fibrillation ([address] Back pain, Gallstone, H/O cardiovascular stress test, H/O Doppler ultrasound, H/O echocardiogram, History of echocardiogram, History of Holter monitoring, Hx of Carotid Doppler ultrasound, Leg cramping, Recurrent urinary tract infection, Spinal stenosis of lumbar region, Vitamin B12 deficiency, and Vitamin D deficiency. Past surgical history:   has no past surgical history on file. Social History:   reports that she has never smoked. She has never used smokeless tobacco. She reports that she does not drink alcohol and does not use drugs.   Family history:   no family history of CAD, STROKE of DM at early age    Allergies   Allergen Reactions    Ciprofibrate     Ciprofloxacin Other (See Comments)     Anorexia      Pregabalin        warfarin (COUMADIN) tablet 1 mg, Daily  methylPREDNISolone sodium (SOLU-MEDROL) injection 40 mg, Q12H  melatonin tablet 6 mg, Nightly PRN  sennosides-docusate sodium (SENOKOT-S) 8.6-50 MG tablet 2 tablet, Nightly  polyethylene glycol (GLYCOLAX) packet 17 g, Daily  lactulose (CHRONULAC) 10 GM/15ML solution 20 g, BID  phenol 1.4 % mouth spray 1 spray, Q2H PRN  calcium carbonate (TUMS) chewable tablet 500 mg, TID PRN  simethicone (MYLICON) chewable tablet 80 mg, 4x Daily PRN  albuterol sulfate  (90 Base) MCG/ACT inhaler 2 puff, Q6H PRN  carbidopa-levodopa (SINEMET)  MG per tablet 1 tablet, Daily  digoxin (LANOXIN) tablet 125 mcg, Daily  carbidopa-levodopa (SINEMET)  MG per tablet 2 tablet, BID  amantadine (SYMMETREL) capsule 100 mg, Nightly  vitamin D CAPS 1,000 Units, Daily  folic acid (FOLVITE) tablet 1 mg, Daily  levothyroxine (SYNTHROID) tablet 50 mcg, Daily  metFORMIN (GLUCOPHAGE) tablet 500 mg, Dinner  vitamin B-12 (CYANOCOBALAMIN) tablet 1,000 mcg, Every Other Day  0.9 % sodium chloride infusion, Continuous  sodium chloride flush 0.9 % injection 10 mL, 2 times per day  sodium chloride flush 0.9 % injection 10 mL, PRN  0.9 % sodium chloride infusion, PRN  potassium chloride 10 mEq/100 mL IVPB (Peripheral Line), PRN  magnesium sulfate 1000 mg in dextrose 5% 100 mL IVPB, PRN  ondansetron (ZOFRAN-ODT) disintegrating tablet 4 mg, Q8H PRN   Or  ondansetron (ZOFRAN) injection 4 mg, Q6H PRN  polyethylene glycol (GLYCOLAX) packet 17 g, Daily PRN  acetaminophen (TYLENOL) tablet 650 mg, Q6H PRN   Or  acetaminophen (TYLENOL) suppository 650 mg, Q6H PRN  insulin lispro (HUMALOG) injection vial 0-6 Units, TID WC  insulin lispro (HUMALOG) injection vial 0-3 Units, Nightly  famotidine (PEPCID) tablet 20 mg, BID  dilTIAZem (CARDIZEM) tablet 60 mg, 3 times per day  traMADol (ULTRAM) tablet 50 mg, Q6H PRN  glucose (GLUTOSE) 40 % oral gel 15 g, PRN  dextrose 50 % IV solution, PRN  glucagon (rDNA) injection 1 mg, PRN  dextrose 5 % solution, PRN  baclofen (LIORESAL) tablet 5 mg, Q4H PRN      Current Facility-Administered Medications   Medication Dose Route Frequency Provider Last Rate Last Admin    warfarin (COUMADIN) tablet 1 mg  1 mg Oral Daily Caroline Townsend MD        methylPREDNISolone sodium (SOLU-MEDROL) injection 40 mg  40 mg IntraVENous Q12H Ruby Hinson MD   40 mg at 10/09/21 0714    melatonin tablet 6 mg  6 mg Oral Nightly PRN Ruby Hinsno MD   6 mg at 10/08/21 2152    sennosides-docusate sodium (SENOKOT-S) 8.6-50 MG tablet 2 tablet  2 tablet Oral Nightly Joseph Hernandez MD   2 tablet at 10/08/21 2144    polyethylene glycol (GLYCOLAX) packet 17 g  17 g Oral Daily Ruby Hinson MD        lactulose (CHRONULAC) 10 GM/15ML solution 20 g  20 g Oral BID Ruby Hinson MD   20 g at 10/09/21 0918    phenol 1.4 % mouth spray 1 spray  1 spray Mouth/Throat Q2H PRN Ruby Hinson MD   1 spray at 10/07/21 0842    calcium carbonate (TUMS) chewable tablet 500 mg  500 mg Oral TID PRN Ruby Hinson MD        simethicone (MYLICON) chewable tablet 80 mg  80 mg Oral 4x Daily PRN Nidhi Sport,         albuterol sulfate  (90 Base) MCG/ACT inhaler 2 puff  2 puff Inhalation Q6H PRN BRENNEN Cunningham - NP   2 puff at 10/04/21 1142    carbidopa-levodopa (SINEMET)  MG per tablet 1 tablet  1 tablet Oral Daily HUMPHREY Rosenthal   1 tablet at 10/08/21 1542    digoxin (LANOXIN) tablet 125 mcg  125 mcg Oral Daily BRENNEN Alves - CNP   125 mcg at 10/09/21 0918    carbidopa-levodopa (SINEMET)  MG per tablet 2 tablet  2 tablet Oral BID HUMPHREY Rosenthal   2 tablet at 10/09/21 0918    amantadine (SYMMETREL) capsule 100 mg  100 mg Oral Nightly Caroline Townsend MD   100 mg at 10/08/21 2152    vitamin D CAPS 1,000 Units  1 capsule Oral Daily Caroline Townsend MD   1,000 Units at 24/08/83 2884    folic acid (FOLVITE) tablet 1 mg  1 mg Oral Daily Gurdeep Rosado MD   1 mg at 10/09/21 0919    levothyroxine (SYNTHROID) tablet 50 mcg  50 mcg Oral Daily Gurdeep Rosado MD   50 mcg at 10/09/21 0714    metFORMIN (GLUCOPHAGE) tablet 500 mg  500 mg Oral Dinner Gurdeep Rosado MD   500 mg at 10/08/21 1758    vitamin B-12 (CYANOCOBALAMIN) tablet 1,000 mcg  1,000 mcg Oral Every Other Day Gurdeep Rosado MD   1,000 mcg at 10/09/21 0919    0.9 % sodium chloride infusion   IntraVENous Continuous Bryannachris Lara DO 40 mL/hr at 10/05/21 0412 New Bag at 10/05/21 0412    sodium chloride flush 0.9 % injection 10 mL  10 mL IntraVENous 2 times per day Gurdeep Rosado MD   10 mL at 10/09/21 0926    sodium chloride flush 0.9 % injection 10 mL  10 mL IntraVENous PRN Gurdeep Rosado MD        0.9 % sodium chloride infusion  25 mL IntraVENous PRN Gurdeep Rosado MD        potassium chloride 10 mEq/100 mL IVPB (Peripheral Line)  10 mEq IntraVENous PRN Gurdeep Rosado MD        magnesium sulfate 1000 mg in dextrose 5% 100 mL IVPB  1,000 mg IntraVENous PRN Gurdeep Rosado MD        ondansetron (ZOFRAN-ODT) disintegrating tablet 4 mg  4 mg Oral Q8H PRN Gurdeep Rosado MD        Or    ondansetron [facility]) injection 4 mg  4 mg IntraVENous Q6H PRN Gurdeep Rosado MD        polyethylene glycol [facility]) packet 17 g  17 g Oral Daily PRN Gurdeep Rosado MD   17 g at 10/02/21 1807    acetaminophen (TYLENOL) tablet 650 mg  650 mg Oral Q6H PRN Gurdeep Rosado MD   650 mg at 10/09/21 0919    Or    acetaminophen (TYLENOL) suppository 650 mg  650 mg Rectal Q6H PRN Gurdeep Rosado MD        insulin lispro (HUMALOG) injection vial 0-6 Units  0-6 Units SubCUTAneous TID WC Gurdeep Rosado MD   2 Units at 10/09/21 1231    insulin lispro (HUMALOG) injection vial 0-3 Units  0-3 Units SubCUTAneous Nightly Gurdeep Rosado MD   1 Units at 10/08/21 5782    famotidine (PEPCID) tablet 20 mg  20 mg Oral BID Gurdeep Rosado MD   20 mg at 10/09/21 0918    dilTIAZem (CARDIZEM) tablet 60 mg  60 mg Oral 3 times per day MD German   60 mg at 10/09/21 0714    traMADol (ULTRAM) tablet 50 mg  50 mg Oral Q6H PRN MD German        glucose (GLUTOSE) 40 % oral gel 15 g  15 g Oral PRN MD German        dextrose 50 % IV solution  12.5 g IntraVENous PRN MD German        glucagon (rDNA) injection 1 mg  1 mg IntraMUSCular PRN MD German        dextrose 5 % solution  100 mL/hr IntraVENous PRN MD German        baclofen (LIORESAL) tablet 5 mg  5 mg Oral Q4H PRN Basil Lot, APRN - NP   5 mg at 10/07/21 2042     Review of Systems:   · Constitutional: No Fever or Weight Loss   · Eyes: No Decreased Vision  · ENT: No Headaches, Hearing Loss or Vertigo  · Cardiovascular: As per HPI  · Respiratory: As per HPI  · Gastrointestinal: No abdominal pain, appetite loss, blood in stools, constipation, diarrhea or heartburn  · Genitourinary: No dysuria, trouble voiding, or hematuria  · Musculoskeletal:  No gait disturbance, weakness or joint complaints  · Integumentary: No rash or pruritis  · Neurological: No TIA or stroke symptoms  · Psychiatric: No anxiety or depression  · Endocrine: No malaise, fatigue or temperature intolerance  · Hematologic/Lymphatic: No bleeding problems, blood clots or swollen lymph nodes  · Allergic/Immunologic: No nasal congestion or hives  All systems negative except as marked. Physical Examination:    Vitals:    10/08/21 0757 10/08/21 2134 10/09/21 0641 10/09/21 0700   BP: (!) 149/73 (!) 151/70 (!) 164/66 (!) 168/76   Pulse: 77 77 85 80   Resp: 16 16 16 16   Temp: 97.9 °F (36.6 °C) 97.5 °F (36.4 °C) 98.4 °F (36.9 °C) 98 °F (36.7 °C)   TempSrc: Oral Oral Oral Oral   SpO2: 90% 92% 95% 92%   Weight:       Height:           General Appearance:  No distress, conversant confused very easily directed    Constitutional:  Well developed, Well nourished, No acute distress, Non-toxic appearance. HENT:  Normocephalic, Atraumatic, Bilateral external ears normal, Oropharynx moist, No oral exudates, Nose normal. Neck- Normal range of motion, No tenderness, Supple, No stridor,no apical-carotid delay  Lymphatics : no palpable lymph nodes  Eyes:  PERRL, EOMI, Conjunctiva normal, No discharge. Respiratory:  Normal breath sounds, No respiratory distress, No wheezing, No chest tenderness. ,no use of accessory muscles, crackles Absent   Cardiovascular: (PMI) apex non displaced,no lifts no thrills, ankle swelling Absent  , 1+, s1 and s2 audible,Murmur. Present, JVD not noted    Abdomen /GI:  Bowel sounds normal, Soft, No tenderness, No masses, No gross visceromegaly   :  No costovertebral angle tenderness   Musculoskeletal:  No edema, no tenderness, no deformities. Back- no tenderness  Integument:  Well hydrated, no rash   Lymphatic:  No lymphadenopathy noted   Neurologic:  Alert & oriented x 3, CN 2-12 normal, normal motor function, normal sensory function, no focal deficits noted           Medical decision making and Data review:    Lab Review   Recent Labs     10/09/21  0433   WBC 9.3   HGB 11.9*   HCT 37.0         Recent Labs     10/09/21  0433      K 4.5      CO2 21   BUN 17   CREATININE 0.7     No results for input(s): AST, ALT, ALB, BILIDIR, BILITOT, ALKPHOS in the last 72 hours. No results for input(s): TROPONINT in the last 72 hours. No results for input(s): PROBNP in the last 72 hours.   Lab Results   Component Value Date    INR 2.94 10/09/2021    PROTIME 38.3 (H) 10/09/2021       EKG: (reviewed by myself)    ECHO:(reviewed by myself)    Chest Xray:(reviewed by myself)  Echocardiogram complete 2D with doppler with color    Result Date: 9/30/2021  Transthoracic Echocardiography Report (TTE)  Demographics   Patient Name       Brady Keane  Date of Study       09/29/2021   Date of Birth      1940         Gender              Female   Age                78 year(s)         Race    Patient Number     [id number]         Room Number         0837   Visit Number       [id number]   Corporate ID       [id number]   Accession Number   [id number]         Jacky Cortez   Ordering Physician Samara Carmen MD        Physician           Jarocho TAYLOR  Procedure Type of Study   TTE procedure:ECHOCARDIOGRAM COMPLETE 2D W DOPPLER W COLOR. Procedure Date Date: 09/29/2021 Start: 02:42 PM Study Location: [facility] - Echo Lab Technical Quality: Fair visualization Indications:Atrial fibrillation. Patient Status: Routine Height: 63 inches Weight: 190 pounds BSA: 1.89 m2 BMI: 33.66 kg/m2 HR: 103 bpm BP: 132/79 mmHg  Conclusions   Summary  Left ventricular systolic function is normal.  Ejection fraction is visually estimated at 50-55%. Mild left ventricular hypertrophy. Mildly dilated left atrium. Moderate mitral regurgitation. No evidence of any pericardial effusion. Signature   ------------------------------------------------------------------  Electronically signed by Brittani Meléndez MD  (Interpreting physician) on 09/30/2021 at 08:52 AM  ------------------------------------------------------------------   Findings   Left Ventricle  Left ventricular systolic function is normal.  Ejection fraction is visually estimated at 50-55%. Left ventricle size is normal.  No regional wall motion abnormalites. Mild left ventricular hypertrophy. Unable to determine diastolic function due to arrhythmia. Left Atrium  Mildly dilated left atrium. Right Atrium  Essentially normal right atrium. Right Ventricle  Essentially normal right ventricle. Aortic Valve  Structurally normal aortic valve. Mitral Valve  Moderate mitral regurgitation. Tricuspid Valve  Structurally normal tricuspid valve. Pulmonic Valve  The pulmonic valve was not well visualized.    Pericardial Effusion  No evidence of any pericardial effusion. Pleural Effusion  No evidence of pleural effusion. Miscellaneous  The aorta is within normal limits. M-Mode/2D Measurements & Calculations   LV Diastolic Dimension:  LV Systolic Dimension:  LA Dimension: 2.8 cmAO Root  4.03 cm                  2.97 cm                 Dimension: 3.3 cmLA Area:  LV FS:26.3 %             LV Volume Diastolic: 40 02.0 cm2  LV PW Diastolic: 1.29 cm ml  LV PW Systolic: 5.66 cm  LV Volume Systolic: 19  Septum Diastolic: 3.79   ml  cm                       LV EDV/LV EDV Index: 40 RV Diastolic Dimension:  Septum Systolic: 7.60 cm VD/38 J9CQ ESV/LV ESV   2.17 cm  CO: 3.59 l/min           Index: 19 ml/10 m2  CI: 1.9 l/m*m2           EF Calculated (A4C):    LA/Aorta: 0.85                           52.5 %  LV Area Diastolic: 45.8  EF Calculated (2D): 52  LA volume/Index: 31 ml  cm2                      %                       /89L6  LV Area Systolic: 33.7  cm2                      LV Length: 6.27 cm                            LVOT: 2 cm  Doppler Measurements & Calculations   MV Peak E-Wave: 85.6    AV Peak Velocity: 90.3 cm/s  LVOT Peak Velocity:  cm/s                    AV Peak Gradient: 3.26 mmHg  73.1 cm/s  MV Peak A-Wave: 56.3    AV Mean Velocity: 65.4 cm/s  LVOT Mean Velocity: 47  cm/s                    AV Mean Gradient: 2 mmHg     cm/s  MV E/A Ratio: 1.52      AV VTI: 12.4 cm              LVOT Peak Gradient: 2  MV Peak Gradient: 2.93  AV Area (Continuity):2.81    mmHgLVOT Mean Gradient:  mmHg                    cm2                          1 mmHg   MV P1/2t: 78 msec       LVOT VTI: 11.1 cm  MVA by PHT:2.82 cm2   MV E' Septal Velocity:  8.01 cm/s  MV E' Lateral Velocity:  12 cm/s  MV E/E' septal: 10.69  MV E/E' lateral: 7.13      XR ABDOMEN (KUB) (SINGLE AP VIEW)    Result Date: 10/5/2021  EXAMINATION: ONE SUPINE XRAY VIEW(S) OF THE ABDOMEN 10/5/2021 9:47 am COMPARISON: None.  HISTORY: ORDERING SYSTEM PROVIDED HISTORY: constipation TECHNOLOGIST PROVIDED HISTORY: Reason for exam:->constipation Reason for Exam: constipation FINDINGS: Nonspecific bowel gas pattern without evidence of obstruction. No abnormal calcifications. No acute osseous abnormality. Nonspecific bowel gas pattern. No evidence of bowel obstruction. CT HEAD WO CONTRAST    Result Date: 9/29/2021  EXAMINATION: CT OF THE HEAD WITHOUT CONTRAST  9/29/2021 12:04 pm TECHNIQUE: CT of the head was performed without the administration of intravenous contrast. Dose modulation, iterative reconstruction, and/or weight based adjustment of the mA/kV was utilized to reduce the radiation dose to as low as reasonably achievable. COMPARISON: 09/11/2020. HISTORY: ORDERING SYSTEM PROVIDED HISTORY: increased generalized confusion and weakness x2 days TECHNOLOGIST PROVIDED HISTORY: Has a \"code stroke\" or \"stroke alert\" been called? ->No Reason for exam:->increased generalized confusion and weakness x2 days Decision Support Exception - unselect if not a suspected or confirmed emergency medical condition->Emergency Medical Condition (MA) Reason for Exam: increased generalized confusion and weakness x2 days FINDINGS: BRAIN/VENTRICLES: There is no acute intracranial hemorrhage, mass effect or midline shift. No abnormal extra-axial fluid collection. The gray-white differentiation is maintained without evidence of an acute infarct. There is prominence of the ventricles and sulci due to global parenchymal volume loss. There are nonspecific areas of hypoattenuation within the periventricular and subcortical white matter, which likely represent chronic microvascular ischemic change. ORBITS: The visualized portion of the orbits demonstrate no acute abnormality. SINUSES: The visualized paranasal sinuses and mastoid air cells demonstrate no acute abnormality. SOFT TISSUES/SKULL: No acute abnormality of the visualized skull or soft tissues. 1.No acute intracranial abnormality.      XR CHEST PORTABLE    Result Date: 10/5/2021  EXAMINATION: ONE XRAY VIEW OF THE CHEST 10/5/2021 9:47 am COMPARISON: Chest radiograph September 29, 2021 HISTORY: ORDERING SYSTEM PROVIDED HISTORY: O2 requirement TECHNOLOGIST PROVIDED HISTORY: Reason for exam:->O2 requirement Reason for Exam: O2 requirement FINDINGS: There are bilateral lower lung opacities. There is no effusion or pneumothorax. The cardiomediastinal silhouette is without acute process. The osseous structures are without acute process. Bilateral lower lung opacity may represent atelectasis or pneumonia in the proper clinical setting. XR CHEST PORTABLE    Result Date: 9/29/2021  EXAMINATION: ONE XRAY VIEW OF THE CHEST 9/29/2021 12:34 pm COMPARISON: Chest x-ray 08/01/2015 HISTORY: ORDERING SYSTEM PROVIDED HISTORY: chest pain TECHNOLOGIST PROVIDED HISTORY: Reason for exam:->chest pain Reason for Exam: chest pain FINDINGS: The lungs are clear. Costophrenic angles are clear. Cardiac and mediastinal structures are unchanged. The bones are unchanged. No evidence of acute cardiopulmonary disease. All labs, medications and tests reviewed by myself including data  from outside source , patient and available family . Continue all other medications of all above medical condition listed as is. Impression:  Principal Problem:    Acute encephalopathy  Active Problems:    Parkinson's disease ([address]    Atrial fibrillation ([address]  Resolved Problems:    * No resolved hospital problems. *      Assessment: 78 y. o.year old with PMH of  has a past medical history of Atrial fibrillation ([address] Back pain, Gallstone, H/O cardiovascular stress test, H/O Doppler ultrasound, H/O echocardiogram, History of echocardiogram, History of Holter monitoring, Hx of Carotid Doppler ultrasound, Leg cramping, Recurrent urinary tract infection, Spinal stenosis of lumbar region, Vitamin B12 deficiency, and Vitamin D deficiency.       Plan and Recommendations:    New onset rash due to concerns for allergic response to possible Cardizem or digoxin although both unlikely and most probably Bactrim is a culprit but due to active concern will stop both Cardizem and digoxin . Start metoprolol  Continue Coumadin  Moderate mitral regurgitation continue to monitor  Parkinsonism and delirium work-up/management as per primary team  DVT prophylaxis if no contraindication  6. Dyslipidemia: continue statins           Thank you  much for consult and giving us the opportunity in contributing in the care of this patient. Please feel free to call me for any questions.        Edison Hooper MD, 10/9/2021 12:57 PM

## 2021-10-09 NOTE — PROGRESS NOTES
[address] is a 78 y.o. female on warfarin therapy for Afib. Pharmacy consulted by Dr. Lorena Payton for monitoring and adjustment of treatment. Indication for anticoagulation: Afib  INR goal: 2-3  Warfarin dose prior to admission: 2mg,1mg alternating days     Pertinent Laboratory Values   Recent Labs     10/07/21  0802 10/07/21  0802 10/08/21  0803 10/08/21  0803 10/09/21  0433   INR 2.82   < > 3.10   < > 2.94   HGB 11.3*   < > 12.1*   < > 11.9*   HCT 35.3*   < > 38.3   < > 37.0     --  278  --  287    < > = values in this interval not displayed.      INR MONITORING  Recent Labs     10/07/21  0802 10/08/21  0803 10/09/21  0433   INR 2.82 3.10 2.94     Assessment/Plan:   Drug Interactions:   Home meds: levothyroxine   Methylprednisolone and tramadol   Cefepime and Bactrim discontinued   INR trended up and was >3 yesterday and dose was held   INR today now trending down and therapeutic   Continue warfarin 1mg daily this evening and make further adjustments per trends   Pharmacy will continue to monitor and adjust warfarin therapy as indicated    Thank you for the consult,  Zelalem SOLIS D [facility], PharmD  10/9/2021 5:17 PM

## 2021-10-10 ENCOUNTER — APPOINTMENT (OUTPATIENT)
Dept: CT IMAGING | Age: 81
DRG: 689 | End: 2021-10-10
Payer: MEDICARE

## 2021-10-10 LAB
ANION GAP SERPL CALCULATED.3IONS-SCNC: 14 MMOL/L (ref 4–16)
BASOPHILS ABSOLUTE: 0 K/CU MM
BASOPHILS RELATIVE PERCENT: 0.2 % (ref 0–1)
BUN BLDV-MCNC: 25 MG/DL (ref 6–23)
CALCIUM SERPL-MCNC: 9.3 MG/DL (ref 8.3–10.6)
CHLORIDE BLD-SCNC: 106 MMOL/L (ref 99–110)
CO2: 21 MMOL/L (ref 21–32)
CREAT SERPL-MCNC: 0.7 MG/DL (ref 0.6–1.1)
DIFFERENTIAL TYPE: ABNORMAL
EOSINOPHILS ABSOLUTE: 0 K/CU MM
EOSINOPHILS RELATIVE PERCENT: 0 % (ref 0–3)
GFR AFRICAN AMERICAN: >60 ML/MIN/1.73M2
GFR NON-AFRICAN AMERICAN: >60 ML/MIN/1.73M2
GLUCOSE BLD-MCNC: 172 MG/DL (ref 70–99)
GLUCOSE BLD-MCNC: 173 MG/DL (ref 70–99)
GLUCOSE BLD-MCNC: 190 MG/DL (ref 70–99)
GLUCOSE BLD-MCNC: 209 MG/DL (ref 70–99)
GLUCOSE BLD-MCNC: 210 MG/DL (ref 70–99)
HCT VFR BLD CALC: 35.2 % (ref 37–47)
HEMOGLOBIN: 11.4 GM/DL (ref 12.5–16)
HIGH SENSITIVE C-REACTIVE PROTEIN: 18 MG/L
IMMATURE NEUTROPHIL %: 1.2 % (ref 0–0.43)
INR BLD: 3.21 INDEX
LYMPHOCYTES ABSOLUTE: 0.8 K/CU MM
LYMPHOCYTES RELATIVE PERCENT: 7.3 % (ref 24–44)
MCH RBC QN AUTO: 32.9 PG (ref 27–31)
MCHC RBC AUTO-ENTMCNC: 32.4 % (ref 32–36)
MCV RBC AUTO: 101.4 FL (ref 78–100)
MONOCYTES ABSOLUTE: 0.6 K/CU MM
MONOCYTES RELATIVE PERCENT: 5.4 % (ref 0–4)
NUCLEATED RBC %: 0 %
PDW BLD-RTO: 12.5 % (ref 11.7–14.9)
PLATELET # BLD: 324 K/CU MM (ref 140–440)
PMV BLD AUTO: 9.7 FL (ref 7.5–11.1)
POTASSIUM SERPL-SCNC: 4.3 MMOL/L (ref 3.5–5.1)
PROTHROMBIN TIME: 41.9 SECONDS (ref 11.7–14.5)
RBC # BLD: 3.47 M/CU MM (ref 4.2–5.4)
SEGMENTED NEUTROPHILS ABSOLUTE COUNT: 9.1 K/CU MM
SEGMENTED NEUTROPHILS RELATIVE PERCENT: 85.9 % (ref 36–66)
SODIUM BLD-SCNC: 141 MMOL/L (ref 135–145)
TOTAL IMMATURE NEUTOROPHIL: 0.13 K/CU MM
TOTAL NUCLEATED RBC: 0 K/CU MM
WBC # BLD: 10.6 K/CU MM (ref 4–10.5)

## 2021-10-10 PROCEDURE — 36415 COLL VENOUS BLD VENIPUNCTURE: CPT

## 2021-10-10 PROCEDURE — 2580000003 HC RX 258: Performed by: INTERNAL MEDICINE

## 2021-10-10 PROCEDURE — 80048 BASIC METABOLIC PNL TOTAL CA: CPT

## 2021-10-10 PROCEDURE — 82962 GLUCOSE BLOOD TEST: CPT

## 2021-10-10 PROCEDURE — 1200000000 HC SEMI PRIVATE

## 2021-10-10 PROCEDURE — 6370000000 HC RX 637 (ALT 250 FOR IP): Performed by: INTERNAL MEDICINE

## 2021-10-10 PROCEDURE — 6370000000 HC RX 637 (ALT 250 FOR IP): Performed by: HOSPITALIST

## 2021-10-10 PROCEDURE — 85610 PROTHROMBIN TIME: CPT

## 2021-10-10 PROCEDURE — 6370000000 HC RX 637 (ALT 250 FOR IP): Performed by: PHYSICIAN ASSISTANT

## 2021-10-10 PROCEDURE — 99233 SBSQ HOSP IP/OBS HIGH 50: CPT | Performed by: INTERNAL MEDICINE

## 2021-10-10 PROCEDURE — 6360000002 HC RX W HCPCS: Performed by: HOSPITALIST

## 2021-10-10 PROCEDURE — 74176 CT ABD & PELVIS W/O CONTRAST: CPT

## 2021-10-10 PROCEDURE — 86141 C-REACTIVE PROTEIN HS: CPT

## 2021-10-10 PROCEDURE — 85025 COMPLETE CBC W/AUTO DIFF WBC: CPT

## 2021-10-10 PROCEDURE — 84145 PROCALCITONIN (PCT): CPT

## 2021-10-10 RX ORDER — PREDNISONE 20 MG/1
40 TABLET ORAL DAILY
Status: DISCONTINUED | OUTPATIENT
Start: 2021-10-11 | End: 2021-10-13 | Stop reason: HOSPADM

## 2021-10-10 RX ORDER — WARFARIN SODIUM 1 MG/1
0.5 TABLET ORAL DAILY
Status: DISCONTINUED | OUTPATIENT
Start: 2021-10-11 | End: 2021-10-13

## 2021-10-10 RX ORDER — LISINOPRIL 10 MG/1
10 TABLET ORAL DAILY
Status: DISCONTINUED | OUTPATIENT
Start: 2021-10-10 | End: 2021-10-13 | Stop reason: HOSPADM

## 2021-10-10 RX ADMIN — AMANTADINE HYDROCHLORIDE 100 MG: 100 CAPSULE, LIQUID FILLED ORAL at 21:20

## 2021-10-10 RX ADMIN — SENNOSIDES AND DOCUSATE SODIUM 2 TABLET: 50; 8.6 TABLET ORAL at 21:20

## 2021-10-10 RX ADMIN — FOLIC ACID 1 MG: 1 TABLET ORAL at 09:42

## 2021-10-10 RX ADMIN — Medication 1000 UNITS: at 09:42

## 2021-10-10 RX ADMIN — FAMOTIDINE 20 MG: 20 TABLET ORAL at 09:42

## 2021-10-10 RX ADMIN — LISINOPRIL 10 MG: 10 TABLET ORAL at 15:13

## 2021-10-10 RX ADMIN — CARBIDOPA AND LEVODOPA 2 TABLET: 25; 100 TABLET ORAL at 09:42

## 2021-10-10 RX ADMIN — CARBIDOPA AND LEVODOPA 1 TABLET: 25; 100 TABLET ORAL at 15:13

## 2021-10-10 RX ADMIN — METOPROLOL TARTRATE 25 MG: 25 TABLET, FILM COATED ORAL at 21:20

## 2021-10-10 RX ADMIN — LACTULOSE 20 G: 10 SOLUTION ORAL at 21:20

## 2021-10-10 RX ADMIN — SODIUM CHLORIDE, PRESERVATIVE FREE 10 ML: 5 INJECTION INTRAVENOUS at 09:43

## 2021-10-10 RX ADMIN — ACETAMINOPHEN 650 MG: 325 TABLET ORAL at 21:34

## 2021-10-10 RX ADMIN — CARBIDOPA AND LEVODOPA 2 TABLET: 25; 100 TABLET ORAL at 21:22

## 2021-10-10 RX ADMIN — WARFARIN SODIUM 0.5 MG: 1 TABLET ORAL at 17:31

## 2021-10-10 RX ADMIN — LACTULOSE 20 G: 10 SOLUTION ORAL at 09:42

## 2021-10-10 RX ADMIN — METOPROLOL TARTRATE 50 MG: 50 TABLET, FILM COATED ORAL at 09:42

## 2021-10-10 RX ADMIN — FAMOTIDINE 20 MG: 20 TABLET ORAL at 21:22

## 2021-10-10 RX ADMIN — LEVOTHYROXINE SODIUM 50 MCG: 0.05 TABLET ORAL at 07:33

## 2021-10-10 RX ADMIN — METHYLPREDNISOLONE SODIUM SUCCINATE 40 MG: 40 INJECTION, POWDER, FOR SOLUTION INTRAMUSCULAR; INTRAVENOUS at 17:33

## 2021-10-10 RX ADMIN — METFORMIN HYDROCHLORIDE 500 MG: 500 TABLET ORAL at 17:32

## 2021-10-10 RX ADMIN — Medication 6 MG: at 21:35

## 2021-10-10 RX ADMIN — SODIUM CHLORIDE, PRESERVATIVE FREE 10 ML: 5 INJECTION INTRAVENOUS at 21:23

## 2021-10-10 RX ADMIN — METHYLPREDNISOLONE SODIUM SUCCINATE 40 MG: 40 INJECTION, POWDER, FOR SOLUTION INTRAMUSCULAR; INTRAVENOUS at 07:33

## 2021-10-10 RX ADMIN — ACETAMINOPHEN 650 MG: 325 TABLET ORAL at 09:51

## 2021-10-10 RX ADMIN — Medication 6 MG: at 00:08

## 2021-10-10 ASSESSMENT — PAIN SCALES - GENERAL
PAINLEVEL_OUTOF10: 5
PAINLEVEL_OUTOF10: 2

## 2021-10-10 NOTE — PROGRESS NOTES
Hospitalist Progress Note      Name:  Anabella Pryor [facility] /Age/Sex: 1940  (78 y.o. female)   MRN & CSN:  [id number] & [id number] Admission Date/Time: 2021 11:26 AM   Location:  [facility] PCP: Errol Guaman MD         Hospital Day: 12    Assessment and Plan:   Jaimie Gustafson is a 78 y.o.  female with past medical history of Parkinson's disease who was admitted for A. fib with rapid ventricular response, confusion and found to have urinary tract infection. Urine culture came back positive for Klebsiella. Patient remains confused. Patient daughter was concerned about abnormal extension of a tissue from her femalegenitalia and wanted OB/GYN to see patient. OB/GYN consult pending. 1.         Paroxysmal atrial fibrillation with rapid ventricular response: Rate controlled  2. Urinary tract infection with urine culture being positive for Klebsiella  3. Metabolic encephalopathy & hospital delirium  4. History of Parkinson's disease  5. Other medical problem include diabetes, hypothyroidism  6. Abnormal skin extension from genitalia  7. Hypomagnesemia resolved  8. Maculopapular Rash--->allergic reaction, improving with steroids, switch to PO tomorrow   9.  Constipation-->refractory to several multiple attempts with different agents over the past few days, CT abd/pelvis w/o contrast to rule out possible fecal impaction    Plan:  Rash likely secondary to ?bactrim--->much improved with steroids, transition to PO steroids   Slight bradycardia noted-->metoprolol dosage adjusted  AMS improving today, patient more awake and alert    Family states patient reporting throat burning--->add spray, acid reflux meds-->resolved  Delirium--patient oscillating btwn hyperactive and hypoactive delirium   Will take mental status time to recover after hospitalization  Consider palliative care for patient, maybe even on outpatient basis   Likely will need increased services on discharge if family refusing SNF placement  Patient titrated off cefepime--may be worsening confusion    Continue IVF to half maintenance. LE edema remains resolved. Encourage PO intake as toleated  Continue Digoxin 125mcg & Diltiazem 60mg q8hrs. INR 2.5 Therapeutic on Coumadin. Culture came back positive for Klebsiella: Continue Cefepime cx sensitive  Procal 0.073, CRP 87 down trending  Patient's Parkinson disease regimen is adjusted by neurology who has signed off. Per Neuro  · Continue Sinemet 25/100 mg 2 tablets three times daily to 2 tablets in the morning, 1 tablet mid day and 2 tablets at night  OB/GYN consult pending for an abnormal skin growth from patient's genitalia. PT/OT eval and treat  Continue patients home medications as indicated for her chronic medical conditions    Diet Adult Oral Nutrition Supplement; Fortified Pudding Oral Supplement  Adult Oral Nutrition Supplement; Frozen Oral Supplement  ADULT DIET; Dysphagia - Soft and Bite Sized   DVT Prophylaxis [x] Coumadin   GI Prophylaxis [] PPI,  [x] H2 Blocker,  [] Carafate,  [] Diet/Tube Feeds   Code Status Full Code   Disposition Patient requires continued admission due to Acute UTI   MDM [] Low, [] Moderate,[x]  High     History of Present Illness:     Chief Complaint: Acute encephalopathy  Cierra Polanco is a 78 y.o.  female  who presents with UTI & metabolic encephalopathy. Daughter is very anxious at bedside in regards to any medication changes, lengthy conversation and questions answered. Reassurance provided. Ten point ROS reviewed negative, unless as noted above    Objective:     No intake or output data in the 24 hours ending 10/10/21 1846   Vitals:   Vitals:    10/10/21 1655   BP: (!) 156/85   Pulse: (!) 46   Resp:    Temp: 98.6 °F (37 °C)   SpO2: 94%     Physical Exam:   GEN  female, somnolent lying in bed, Appears given age. EYES Pupils are equally round.   No scleral erythema, discharge, or conjunctivitis. HENT Mucous membranes are moist. Oral pharynx without exudates, no evidence of thrush. RESP Clear to auscultation, no wheezes, rales or rhonchi. Symmetric chest movement while on room air. CARDIO/VASC S1/S2 auscultated. Regular rate without appreciable murmurs, rubs, or gallops. GI Abdomen is soft without significant tenderness, masses, or guarding. MSK No gross joint deformities. SKIN Normal coloration, warm, dry. NEURO Moves all four extremities, weaker on left side baseline per family  PSYCH Awake, alert, oriented x 1. Affect appropriate.     Medications:   Medications:    lisinopril  10 mg Oral Daily    metoprolol tartrate  25 mg Oral BID    [START ON 10/11/2021] warfarin  0.5 mg Oral Daily    [START ON 10/11/2021] predniSONE  40 mg Oral Daily    sennosides-docusate sodium  2 tablet Oral Nightly    polyethylene glycol  17 g Oral Daily    lactulose  20 g Oral BID    carbidopa-levodopa  1 tablet Oral Daily    carbidopa-levodopa  2 tablet Oral BID    amantadine  100 mg Oral Nightly    vitamin D  1 capsule Oral Daily    folic acid  1 mg Oral Daily    levothyroxine  50 mcg Oral Daily    metFORMIN  500 mg Oral Dinner    vitamin B-12  1,000 mcg Oral Every Other Day    sodium chloride flush  10 mL IntraVENous 2 times per day    insulin lispro  0-6 Units SubCUTAneous TID WC    insulin lispro  0-3 Units SubCUTAneous Nightly    famotidine  20 mg Oral BID      Infusions:    sodium chloride      dextrose       PRN Meds: melatonin, 6 mg, Nightly PRN  phenol, 1 spray, Q2H PRN  calcium carbonate, 500 mg, TID PRN  simethicone, 80 mg, 4x Daily PRN  albuterol sulfate HFA, 2 puff, Q6H PRN  sodium chloride flush, 10 mL, PRN  sodium chloride, 25 mL, PRN  potassium chloride, 10 mEq, PRN  magnesium sulfate, 1,000 mg, PRN  ondansetron, 4 mg, Q8H PRN   Or  ondansetron, 4 mg, Q6H PRN  polyethylene glycol, 17 g, Daily PRN  acetaminophen, 650 mg, Q6H PRN   Or  acetaminophen, 650 mg, Q6H PRN  traMADol, 50 mg, Q6H PRN  glucose, 15 g, PRN  dextrose, 12.5 g, PRN  glucagon (rDNA), 1 mg, PRN  dextrose, 100 mL/hr, PRN  baclofen, 5 mg, Q4H PRN          Electronically signed by Daniella Cintron MD on 10/10/2021 at 6:46 PM

## 2021-10-10 NOTE — PROGRESS NOTES
Hospitalist Progress Note      Name:  Calos Haro [facility] /Age/Sex: 1940  (78 y.o. female)   MRN & CSN:  [id number] & [id number] Admission Date/Time: 2021 11:26 AM   Location:  [address] [place] [address] PCP: Celeste Mckeon MD         Hospital Day: 11    Assessment and Plan:   Raisa Helm is a 78 y.o.  female with past medical history of Parkinson's disease who was admitted for A. fib with rapid ventricular response, confusion and found to have urinary tract infection. Urine culture came back positive for Klebsiella. Patient remains confused. Patient daughter was concerned about abnormal extension of a tissue from her femalegenitalia and wanted OB/GYN to see patient. OB/GYN consult pending. 1.         Paroxysmal atrial fibrillation with rapid ventricular response: Rate controlled  2. Urinary tract infection with urine culture being positive for Klebsiella  3. Metabolic encephalopathy & hospital delirium  4. History of Parkinson's disease  5. Other medical problem include diabetes, hypothyroidism  6. Abnormal skin extension from genitalia  7. Hypomagnesemia resolved  8.  Maculopapular Rash--->allergic reaction, improving with steroids, switch to PO tomorrow     Plan:  Rash likely secondary to ?bactrim   Cardiology consulted, appreciate recs-->cardizem and digoxin dc'ed, switched to metoprolol, unlikely to have caused allergic rxn, however family was concerned  AMS improving today, patient more awake and alert   Family states patient reporting throat burning--->add spray, acid reflux meds-->resolved  Delirium--patient oscillating btwn hyperactive and hypoactive delirium   Will take mental status time to recover after hospitalization  Consider palliative care for patient, maybe even on outpatient basis   Likely will need increased services on discharge if family refusing SNF placement  Patient titrated off cefepime--may be worsening membranes are moist. Oral pharynx without exudates, no evidence of thrush. RESP Clear to auscultation, no wheezes, rales or rhonchi. Symmetric chest movement while on room air. CARDIO/VASC S1/S2 auscultated. Regular rate without appreciable murmurs, rubs, or gallops. GI Abdomen is soft without significant tenderness, masses, or guarding. MSK No gross joint deformities. SKIN Normal coloration, warm, dry. NEURO Moves all four extremities, weaker on left side baseline per family  PSYCH Awake, alert, oriented x 1. Affect appropriate.     Medications:   Medications:    metoprolol tartrate  50 mg Oral BID    warfarin  1 mg Oral Daily    methylPREDNISolone  40 mg IntraVENous Q12H    sennosides-docusate sodium  2 tablet Oral Nightly    polyethylene glycol  17 g Oral Daily    lactulose  20 g Oral BID    carbidopa-levodopa  1 tablet Oral Daily    carbidopa-levodopa  2 tablet Oral BID    amantadine  100 mg Oral Nightly    vitamin D  1 capsule Oral Daily    folic acid  1 mg Oral Daily    levothyroxine  50 mcg Oral Daily    metFORMIN  500 mg Oral Dinner    vitamin B-12  1,000 mcg Oral Every Other Day    sodium chloride flush  10 mL IntraVENous 2 times per day    insulin lispro  0-6 Units SubCUTAneous TID WC    insulin lispro  0-3 Units SubCUTAneous Nightly    famotidine  20 mg Oral BID      Infusions:    sodium chloride 40 mL/hr at 10/05/21 0412    sodium chloride      dextrose       PRN Meds: melatonin, 6 mg, Nightly PRN  phenol, 1 spray, Q2H PRN  calcium carbonate, 500 mg, TID PRN  simethicone, 80 mg, 4x Daily PRN  albuterol sulfate HFA, 2 puff, Q6H PRN  sodium chloride flush, 10 mL, PRN  sodium chloride, 25 mL, PRN  potassium chloride, 10 mEq, PRN  magnesium sulfate, 1,000 mg, PRN  ondansetron, 4 mg, Q8H PRN   Or  ondansetron, 4 mg, Q6H PRN  polyethylene glycol, 17 g, Daily PRN  acetaminophen, 650 mg, Q6H PRN   Or  acetaminophen, 650 mg, Q6H PRN  traMADol, 50 mg, Q6H PRN  glucose, 15 g, PRN  dextrose, 12.5 g, PRN  glucagon (rDNA), 1 mg, PRN  dextrose, 100 mL/hr, PRN  baclofen, 5 mg, Q4H PRN          Electronically signed by Lacey Fong MD on 10/9/2021 at 8:14 PM

## 2021-10-10 NOTE — PROGRESS NOTES
[address] is a 78 y.o. female on warfarin therapy for Afib. Pharmacy consulted by Dr. Thong Casillas for monitoring and adjustment of treatment. Indication for anticoagulation: Afib  INR goal: 2-3  Warfarin dose prior to admission: 2mg,1mg alternating days     Pertinent Laboratory Values   Recent Labs     10/08/21  0803 10/08/21  0803 10/09/21  0433 10/09/21  0433 10/10/21  0457   INR 3.10   < > 2.94   < > 3.21   HGB 12.1*   < > 11.9*   < > 11.4*   HCT 38.3   < > 37.0   < > 35.2*     --  287  --  324    < > = values in this interval not displayed.      INR MONITORING  Recent Labs     10/08/21  0803 10/09/21  0433 10/10/21  0457   INR 3.10 2.94 3.21     Assessment/Plan:   Drug Interactions:   Home meds: levothyroxine   Methylprednisolone and tramadol   Cefepime and Bactrim discontinued   INR increased > 3 today   Plan to hold dose today for elevated INR   Reduce warfarin to 0.5 mg beginning tomorrow and make further adjustments per trends   Pharmacy will continue to monitor and adjust warfarin therapy as indicated    Thank you for the consult,  Araceli Hernandez, [facility], PharmD  10/10/2021 5:29 PM

## 2021-10-10 NOTE — PROGRESS NOTES
Hospitalist Progress Note      Name:  [facility] /Age/Sex: 1940  (78 y.o. female)   MRN & CSN:  [id number] & [id number] Admission Date/Time: 2021 11:26 AM   Location:  [facility] PCP: Bandar Randall MD         Hospital Day: 12    Assessment and Plan:   Patrice Jung is a 78 y.o.  female with past medical history of Parkinson's disease who was admitted for A. fib with rapid ventricular response, confusion and found to have urinary tract infection. Urine culture came back positive for Klebsiella. Patient remains confused. Patient daughter was concerned about abnormal extension of a tissue from her femalegenitalia and wanted OB/GYN to see patient. OB/GYN consult pending. 1.         Paroxysmal atrial fibrillation with rapid ventricular response: Rate controlled  2. Urinary tract infection with urine culture being positive for Klebsiella  3. Metabolic encephalopathy & hospital delirium  4. History of Parkinson's disease  5. Other medical problem include diabetes, hypothyroidism  6. Abnormal skin extension from genitalia  7. Hypomagnesemia resolved  8.  Maculopapular Rash--->allergic reaction, improving with steroids, switch to PO tomorrow     Plan:  Rash likely secondary to ?bactrim   Cardiology consulted, appreciate recs-->cardizem and digoxin dc'ed, switched to metoprolol, unlikely to have caused allergic rxn, however family was concerned  AMS improving today, patient more awake and alert   Family states patient reporting throat burning--->add spray, acid reflux meds-->resolved  Delirium--patient oscillating btwn hyperactive and hypoactive delirium   Will take mental status time to recover after hospitalization  Consider palliative care for patient, maybe even on outpatient basis   Likely will need increased services on discharge if family refusing SNF placement  Patient titrated off cefepime--may be worsening confusion    Continue IVF to half maintenance. LE edema remains resolved. Encourage PO intake as toleated  Continue Digoxin 125mcg & Diltiazem 60mg q8hrs. INR 2.5 Therapeutic on Coumadin. Culture came back positive for Klebsiella: Continue Cefepime cx sensitive  Procal 0.073, CRP 87 down trending  Patient's Parkinson disease regimen is adjusted by neurology who has signed off. Per Neuro  · Continue Sinemet 25/100 mg 2 tablets three times daily to 2 tablets in the morning, 1 tablet mid day and 2 tablets at night  OB/GYN consult pending for an abnormal skin growth from patient's genitalia. PT/OT eval and treat  Continue patients home medications as indicated for her chronic medical conditions    Diet Adult Oral Nutrition Supplement; Fortified Pudding Oral Supplement  Adult Oral Nutrition Supplement; Frozen Oral Supplement  ADULT DIET; Dysphagia - Soft and Bite Sized   DVT Prophylaxis [x] Coumadin   GI Prophylaxis [] PPI,  [x] H2 Blocker,  [] Carafate,  [] Diet/Tube Feeds   Code Status Full Code   Disposition Patient requires continued admission due to Acute UTI   MDM [] Low, [] Moderate,[x]  High     History of Present Illness:     Chief Complaint: Acute encephalopathy  Lauren Herrera is a 78 y.o.  female  who presents with UTI & metabolic encephalopathy. Patient more back to baseline mental status, up and awake in chair able to chat with me a little. Rash improved, not quite resolved. Ten point ROS reviewed negative, unless as noted above    Objective:     No intake or output data in the 24 hours ending 10/10/21 1813   Vitals:   Vitals:    10/10/21 1655   BP: (!) 156/85   Pulse: (!) 46   Resp:    Temp: 98.6 °F (37 °C)   SpO2: 94%     Physical Exam:   GEN  female, somnolent lying in bed, Appears given age. EYES Pupils are equally round. No scleral erythema, discharge, or conjunctivitis. HENT Mucous membranes are moist. Oral pharynx without exudates, no evidence of thrush.   RESP Clear to (rDNA), 1 mg, PRN  dextrose, 100 mL/hr, PRN  baclofen, 5 mg, Q4H PRN          Electronically signed by Aminta Blas MD on 10/10/2021 at 6:13 PM

## 2021-10-10 NOTE — PROGRESS NOTES
Cardiology Progress Note     Admit Date:  9/29/2021    Consult reason/ Seen today for :   afib    Subjective and  Overnight Events :  Rash is much better , explained to daughter that cardizem is less likely and most probably due to bacterium  Ekg and tele show she is  In sinus       Chief complain on admission : 78 y. o.year old who is admitted for  Chief Complaint   Patient presents with    Irregular Heart Beat     A.fib RVR      Assessment / Plan:  Continue metoprolol on coumadin   Paroxysmal afib:in sinus now   Moderate MR continue to monitor   HTN: elevated ,add lisinopril 10 mg  continue To titrate up medication as needed  DVT Prophylaxis if no contraindication  Will sign off call with questions    Past medical history:    has a past medical history of Atrial fibrillation ([address] Back pain, Gallstone, H/O cardiovascular stress test, H/O Doppler ultrasound, H/O echocardiogram, History of echocardiogram, History of Holter monitoring, Hx of Carotid Doppler ultrasound, Leg cramping, Recurrent urinary tract infection, Spinal stenosis of lumbar region, Vitamin B12 deficiency, and Vitamin D deficiency. Past surgical history:   has no past surgical history on file. Social History:   reports that she has never smoked. She has never used smokeless tobacco. She reports that she does not drink alcohol and does not use drugs. Family history:  family history includes Heart Disease in her brother; High Blood Pressure in her sister.     Allergies   Allergen Reactions    Ciprofibrate     Ciprofloxacin Other (See Comments)     Anorexia      Pregabalin        Review of Systems:    All 14 systems were reviewed and are negative  Except for the positive findings  which as documented     BP (!) 171/77   Pulse 80   Temp 97.5 °F (36.4 °C) (Axillary)   Resp 16   Ht 5' 3\" (1.6 m)   Wt 188 lb 1.6 oz (85.3 kg)   SpO2 93%   BMI 33.32 kg/m²   No intake or output data in the 24 hours ending 10/10/21 1200  Physical Exam:  Constitutional:  Well developed, Well nourished, No acute distress, Non-toxic appearance. HENT:  Normocephalic, Atraumatic, Bilateral external ears normal, Oropharynx moist, No oral exudates, Nose normal. Neck- Normal range of motion, No tenderness, Supple, No stridor. Eyes:  PERRL, EOMI, Conjunctiva normal, No discharge. Respiratory:  Normal breath sounds, No respiratory distress, No wheezing, No chest tenderness. Cardiovascular:  Normal heart rate, Normal rhythm, No murmurs, No rubs, No gallops, JVP not elevated  Abdomen/GI:  Bowel sounds normal, Soft, No tenderness, No masses, No pulsatile masses. Musculoskeletal:  Intact distal pulses, No edema, No tenderness, No cyanosis, No clubbing. Good range of motion in all major joints. No tenderness to palpation or major deformities noted. Back- No tenderness. Integument:  Warm, Dry, No erythema, No rash. Lymphatic:  No lymphadenopathy noted. Neurologic:  Alert & oriented x 3, Normal motor function, Normal sensory function, No focal deficits noted.    Psychiatric:  Affect  and  Mood :no change    Medications:    metoprolol tartrate  50 mg Oral BID    warfarin  1 mg Oral Daily    methylPREDNISolone  40 mg IntraVENous Q12H    sennosides-docusate sodium  2 tablet Oral Nightly    polyethylene glycol  17 g Oral Daily    lactulose  20 g Oral BID    carbidopa-levodopa  1 tablet Oral Daily    carbidopa-levodopa  2 tablet Oral BID    amantadine  100 mg Oral Nightly    vitamin D  1 capsule Oral Daily    folic acid  1 mg Oral Daily    levothyroxine  50 mcg Oral Daily    metFORMIN  500 mg Oral Dinner    vitamin B-12  1,000 mcg Oral Every Other Day    sodium chloride flush  10 mL IntraVENous 2 times per day    insulin lispro  0-6 Units SubCUTAneous TID     insulin lispro  0-3 Units SubCUTAneous Nightly    famotidine  20 mg Oral BID      sodium chloride 40 mL/hr at 10/05/21 8056  sodium chloride      dextrose       melatonin, phenol, calcium carbonate, simethicone, albuterol sulfate HFA, sodium chloride flush, sodium chloride, potassium chloride, magnesium sulfate, ondansetron **OR** ondansetron, polyethylene glycol, acetaminophen **OR** acetaminophen, traMADol, glucose, dextrose, glucagon (rDNA), dextrose, baclofen    Lab Data:  CBC:   Recent Labs     10/08/21  0803 10/09/21  0433 10/10/21  0457   WBC 10.0 9.3 10.6*   HGB 12.1* 11.9* 11.4*   HCT 38.3 37.0 35.2*   .9* 100.8* 101.4*    287 324     BMP:   Recent Labs     10/08/21  0803 10/09/21  0433 10/10/21  0457    138 141   K 3.8 4.5 4.3    106 106   CO2 23 21 21   BUN 18 17 25*   CREATININE 0.8 0.7 0.7     PT/INR:   Recent Labs     10/08/21  0803 10/09/21  0433 10/10/21  0457   PROTIME 40.4* 38.3* 41.9*   INR 3.10 2.94 3.21     BNP:  No results for input(s): PROBNP in the last 72 hours. TROPONIN: No results for input(s): TROPONINT in the last 72 hours. ECHO :   echocardiogram    Assessment:  78 y. o.year old who is admitted for  Chief Complaint   Patient presents with    Irregular Heart Beat     A.fib RVR    , active issues as noted below:  Impression:  Principal Problem:    Acute encephalopathy  Active Problems:    Parkinson's disease ([facility] [address]    Atrial fibrillation ([facility] [address]  Resolved Problems:    * No resolved hospital problems. *            All labs, medications and tests reviewed by myself , continue all other medications of all above medical condition listed as is except for changes mentioned above. Thank you very much for consult , please call with questions.     Melany Parra MD, MD 10/10/2021 12:00 PM

## 2021-10-11 ENCOUNTER — APPOINTMENT (OUTPATIENT)
Dept: ULTRASOUND IMAGING | Age: 81
DRG: 689 | End: 2021-10-11
Payer: MEDICARE

## 2021-10-11 LAB
ANION GAP SERPL CALCULATED.3IONS-SCNC: 14 MMOL/L (ref 4–16)
BASOPHILS ABSOLUTE: 0 K/CU MM
BASOPHILS RELATIVE PERCENT: 0.2 % (ref 0–1)
BUN BLDV-MCNC: 31 MG/DL (ref 6–23)
CALCIUM SERPL-MCNC: 9.2 MG/DL (ref 8.3–10.6)
CHLORIDE BLD-SCNC: 104 MMOL/L (ref 99–110)
CO2: 22 MMOL/L (ref 21–32)
CREAT SERPL-MCNC: 0.5 MG/DL (ref 0.6–1.1)
DIFFERENTIAL TYPE: ABNORMAL
EOSINOPHILS ABSOLUTE: 0 K/CU MM
EOSINOPHILS RELATIVE PERCENT: 0 % (ref 0–3)
GFR AFRICAN AMERICAN: >60 ML/MIN/1.73M2
GFR NON-AFRICAN AMERICAN: >60 ML/MIN/1.73M2
GLUCOSE BLD-MCNC: 144 MG/DL (ref 70–99)
GLUCOSE BLD-MCNC: 174 MG/DL (ref 70–99)
GLUCOSE BLD-MCNC: 179 MG/DL (ref 70–99)
GLUCOSE BLD-MCNC: 186 MG/DL (ref 70–99)
GLUCOSE BLD-MCNC: 193 MG/DL (ref 70–99)
HCT VFR BLD CALC: 39 % (ref 37–47)
HEMOGLOBIN: 12.3 GM/DL (ref 12.5–16)
IMMATURE NEUTROPHIL %: 1.5 % (ref 0–0.43)
INR BLD: 2.54 INDEX
LYMPHOCYTES ABSOLUTE: 1.2 K/CU MM
LYMPHOCYTES RELATIVE PERCENT: 10.9 % (ref 24–44)
MCH RBC QN AUTO: 32.8 PG (ref 27–31)
MCHC RBC AUTO-ENTMCNC: 31.5 % (ref 32–36)
MCV RBC AUTO: 104 FL (ref 78–100)
MONOCYTES ABSOLUTE: 1 K/CU MM
MONOCYTES RELATIVE PERCENT: 9.6 % (ref 0–4)
NUCLEATED RBC %: 0 %
PDW BLD-RTO: 12.6 % (ref 11.7–14.9)
PLATELET # BLD: 329 K/CU MM (ref 140–440)
PMV BLD AUTO: 9.9 FL (ref 7.5–11.1)
POTASSIUM SERPL-SCNC: 4.6 MMOL/L (ref 3.5–5.1)
PROCALCITONIN: 0.03
PROCALCITONIN: 0.06
PROTHROMBIN TIME: 33.1 SECONDS (ref 11.7–14.5)
RBC # BLD: 3.75 M/CU MM (ref 4.2–5.4)
SEGMENTED NEUTROPHILS ABSOLUTE COUNT: 8.3 K/CU MM
SEGMENTED NEUTROPHILS RELATIVE PERCENT: 77.8 % (ref 36–66)
SODIUM BLD-SCNC: 140 MMOL/L (ref 135–145)
TOTAL IMMATURE NEUTOROPHIL: 0.16 K/CU MM
TOTAL NUCLEATED RBC: 0 K/CU MM
WBC # BLD: 10.6 K/CU MM (ref 4–10.5)

## 2021-10-11 PROCEDURE — 76856 US EXAM PELVIC COMPLETE: CPT

## 2021-10-11 PROCEDURE — 1200000000 HC SEMI PRIVATE

## 2021-10-11 PROCEDURE — 36415 COLL VENOUS BLD VENIPUNCTURE: CPT

## 2021-10-11 PROCEDURE — 82962 GLUCOSE BLOOD TEST: CPT

## 2021-10-11 PROCEDURE — 6370000000 HC RX 637 (ALT 250 FOR IP): Performed by: FAMILY MEDICINE

## 2021-10-11 PROCEDURE — 6370000000 HC RX 637 (ALT 250 FOR IP): Performed by: SPECIALIST

## 2021-10-11 PROCEDURE — 85610 PROTHROMBIN TIME: CPT

## 2021-10-11 PROCEDURE — 2580000003 HC RX 258: Performed by: INTERNAL MEDICINE

## 2021-10-11 PROCEDURE — 6360000002 HC RX W HCPCS: Performed by: INTERNAL MEDICINE

## 2021-10-11 PROCEDURE — 94761 N-INVAS EAR/PLS OXIMETRY MLT: CPT

## 2021-10-11 PROCEDURE — 6370000000 HC RX 637 (ALT 250 FOR IP): Performed by: INTERNAL MEDICINE

## 2021-10-11 PROCEDURE — 6370000000 HC RX 637 (ALT 250 FOR IP): Performed by: PHYSICIAN ASSISTANT

## 2021-10-11 PROCEDURE — 85025 COMPLETE CBC W/AUTO DIFF WBC: CPT

## 2021-10-11 PROCEDURE — 80048 BASIC METABOLIC PNL TOTAL CA: CPT

## 2021-10-11 PROCEDURE — 6370000000 HC RX 637 (ALT 250 FOR IP): Performed by: HOSPITALIST

## 2021-10-11 PROCEDURE — 93975 VASCULAR STUDY: CPT

## 2021-10-11 RX ORDER — LUBIPROSTONE 8 UG/1
24 CAPSULE, GELATIN COATED ORAL 2 TIMES DAILY WITH MEALS
Status: DISCONTINUED | OUTPATIENT
Start: 2021-10-11 | End: 2021-10-13 | Stop reason: HOSPADM

## 2021-10-11 RX ADMIN — METOPROLOL TARTRATE 25 MG: 25 TABLET, FILM COATED ORAL at 22:32

## 2021-10-11 RX ADMIN — CARBIDOPA AND LEVODOPA 1 TABLET: 25; 100 TABLET ORAL at 13:57

## 2021-10-11 RX ADMIN — LEVOTHYROXINE SODIUM 50 MCG: 0.05 TABLET ORAL at 08:38

## 2021-10-11 RX ADMIN — Medication 6 MG: at 22:31

## 2021-10-11 RX ADMIN — WARFARIN SODIUM 0.5 MG: 1 TABLET ORAL at 17:39

## 2021-10-11 RX ADMIN — ACETAMINOPHEN 650 MG: 325 TABLET ORAL at 22:31

## 2021-10-11 RX ADMIN — PREDNISONE 40 MG: 20 TABLET ORAL at 08:39

## 2021-10-11 RX ADMIN — METOPROLOL TARTRATE 25 MG: 25 TABLET, FILM COATED ORAL at 08:37

## 2021-10-11 RX ADMIN — LUBIPROSTONE 24 MCG: 8 CAPSULE, GELATIN COATED ORAL at 13:57

## 2021-10-11 RX ADMIN — AMANTADINE HYDROCHLORIDE 100 MG: 100 CAPSULE, LIQUID FILLED ORAL at 22:32

## 2021-10-11 RX ADMIN — CYANOCOBALAMIN TAB 1000 MCG 1000 MCG: 1000 TAB at 08:39

## 2021-10-11 RX ADMIN — FAMOTIDINE 20 MG: 20 TABLET ORAL at 08:39

## 2021-10-11 RX ADMIN — FOLIC ACID 1 MG: 1 TABLET ORAL at 08:37

## 2021-10-11 RX ADMIN — Medication 1000 UNITS: at 08:38

## 2021-10-11 RX ADMIN — METFORMIN HYDROCHLORIDE 500 MG: 500 TABLET ORAL at 17:37

## 2021-10-11 RX ADMIN — LACTULOSE 20 G: 10 SOLUTION ORAL at 08:39

## 2021-10-11 RX ADMIN — SODIUM CHLORIDE, PRESERVATIVE FREE 10 ML: 5 INJECTION INTRAVENOUS at 22:50

## 2021-10-11 RX ADMIN — FAMOTIDINE 20 MG: 20 TABLET ORAL at 22:31

## 2021-10-11 RX ADMIN — SODIUM CHLORIDE, PRESERVATIVE FREE 10 ML: 5 INJECTION INTRAVENOUS at 08:39

## 2021-10-11 RX ADMIN — LISINOPRIL 10 MG: 10 TABLET ORAL at 08:38

## 2021-10-11 RX ADMIN — POLYETHYLENE GLYCOL (3350) 17 G: 17 POWDER, FOR SOLUTION ORAL at 08:38

## 2021-10-11 RX ADMIN — CARBIDOPA AND LEVODOPA 2 TABLET: 25; 100 TABLET ORAL at 08:37

## 2021-10-11 RX ADMIN — ONDANSETRON 4 MG: 2 INJECTION INTRAMUSCULAR; INTRAVENOUS at 03:02

## 2021-10-11 ASSESSMENT — ENCOUNTER SYMPTOMS
COUGH: 0
CONSTIPATION: 0
PHOTOPHOBIA: 0
COLOR CHANGE: 0
VOMITING: 0
ABDOMINAL PAIN: 0
BLOOD IN STOOL: 0
NAUSEA: 0
WHEEZING: 0
DIARRHEA: 0
BACK PAIN: 0
CHEST TIGHTNESS: 0
SHORTNESS OF BREATH: 0
EYE PAIN: 0

## 2021-10-11 ASSESSMENT — PAIN SCALES - GENERAL
PAINLEVEL_OUTOF10: 0

## 2021-10-11 NOTE — PROGRESS NOTES
Hospitalist Progress Note      Name:  [facility]netestephan[facility] /Age/Sex: 1940  (78 y.o. female)   MRN & CSN:  [id number] & [id number] Admission Date/Time: 2021 11:26 AM   Location:  [address] [place] [address] PCP: Paul Rivera MD         Hospital Day: 13    Assessment and Plan:   Peyton Finn is a 78 y.o.  female with past medical history of Parkinson's disease who was admitted for A. fib with rapid ventricular response, confusion and found to have urinary tract infection. Urine culture came back positive for Klebsiella. Patient remains confused. Patient daughter was concerned about abnormal extension of a tissue from her femalegenitalia and wanted OB/GYN to see patient. OB/GYN consult pending. 1.         Paroxysmal atrial fibrillation with rapid ventricular response: Rate controlled  2. Urinary tract infection with urine culture being positive for Klebsiella  3. Metabolic encephalopathy & hospital delirium  4. History of Parkinson's disease  5. Other medical problem include diabetes, hypothyroidism  6. Abnormal skin extension from genitalia  7. Hypomagnesemia resolved  8. Maculopapular Rash--->allergic reaction, improving with steroids, switch to PO tomorrow   9. Constipation-->refractory to several multiple attempts with different agents over the past few days, CT abd/pelvis w/o contrast to rule out possible fecal impaction.  GI consulted, appreciate recs     Plan:  Rash likely secondary to ?bactrim--->much improved with steroids, transition to PO steroids   Slight bradycardia noted-->metoprolol dosage adjusted  AMS improving today, patient more awake and alert    Family states patient reporting throat burning--->add spray, acid reflux meds-->resolved  Delirium--patient oscillating btwn hyperactive and hypoactive delirium   Will take mental status time to recover after hospitalization  Consider palliative care for patient, maybe even on outpatient basis   Likely will need increased services on discharge if family refusing SNF placement  Patient titrated off cefepime--may be worsening confusion    Continue IVF to half maintenance. LE edema remains resolved. Encourage PO intake as toleated  Continue Digoxin 125mcg & Diltiazem 60mg q8hrs. INR 2.5 Therapeutic on Coumadin. Culture came back positive for Klebsiella: Continue Cefepime cx sensitive  Procal 0.073, CRP 87 down trending  Patient's Parkinson disease regimen is adjusted by neurology who has signed off. Per Neuro  · Continue Sinemet 25/100 mg 2 tablets three times daily to 2 tablets in the morning, 1 tablet mid day and 2 tablets at night  OB/GYN consult pending for an abnormal skin growth from patient's genitalia. PT/OT eval and treat  Continue patients home medications as indicated for her chronic medical conditions    Diet Adult Oral Nutrition Supplement; Fortified Pudding Oral Supplement  Adult Oral Nutrition Supplement; Frozen Oral Supplement  ADULT DIET; Dysphagia - Soft and Bite Sized   DVT Prophylaxis [x] Coumadin   GI Prophylaxis [] PPI,  [x] H2 Blocker,  [] Carafate,  [] Diet/Tube Feeds   Code Status Full Code   Disposition Patient requires continued admission due to Acute UTI   MDM [] Low, [] Moderate,[x]  High     History of Present Illness:     Chief Complaint: Acute encephalopathy  Edda De Los Santos is a 78 y.o.  female  who presents with UTI & metabolic encephalopathy. Patient's daughter reports that patient seems uncomfortable and wanting to have a bowel movement, but can't. Also reports that patient is more tired appearing today than the past few days. Ten point ROS reviewed negative, unless as noted above    Objective:        Intake/Output Summary (Last 24 hours) at 10/11/2021 1918  Last data filed at 10/10/2021 2135  Gross per 24 hour   Intake 120 ml   Output --   Net 120 ml      Vitals:   Vitals:    10/11/21 1400   BP: 124/73   Pulse: 71   Resp: 16   Temp: 98.8 °F (37.1 °C) SpO2: 92%     Physical Exam:   GEN  female, somnolent lying in bed, Appears given age. EYES Pupils are equally round. No scleral erythema, discharge, or conjunctivitis. HENT Mucous membranes are moist. Oral pharynx without exudates, no evidence of thrush. RESP Clear to auscultation, no wheezes, rales or rhonchi. Symmetric chest movement while on room air. CARDIO/VASC S1/S2 auscultated. Regular rate without appreciable murmurs, rubs, or gallops. GI Abdomen is soft without significant tenderness, masses, or guarding. MSK No gross joint deformities. SKIN Normal coloration, warm, dry. NEURO Moves all four extremities, weaker on left side baseline per family  PSYCH Awake, alert, oriented x 1. Affect appropriate.     Medications:   Medications:    lubiprostone  24 mcg Oral BID WC    lisinopril  10 mg Oral Daily    metoprolol tartrate  25 mg Oral BID    warfarin  0.5 mg Oral Daily    predniSONE  40 mg Oral Daily    sennosides-docusate sodium  2 tablet Oral Nightly    polyethylene glycol  17 g Oral Daily    lactulose  20 g Oral BID    carbidopa-levodopa  1 tablet Oral Daily    carbidopa-levodopa  2 tablet Oral BID    amantadine  100 mg Oral Nightly    vitamin D  1 capsule Oral Daily    folic acid  1 mg Oral Daily    levothyroxine  50 mcg Oral Daily    metFORMIN  500 mg Oral Dinner    vitamin B-12  1,000 mcg Oral Every Other Day    sodium chloride flush  10 mL IntraVENous 2 times per day    insulin lispro  0-6 Units SubCUTAneous TID     insulin lispro  0-3 Units SubCUTAneous Nightly    famotidine  20 mg Oral BID      Infusions:    sodium chloride      dextrose       PRN Meds: melatonin, 6 mg, Nightly PRN  phenol, 1 spray, Q2H PRN  calcium carbonate, 500 mg, TID PRN  simethicone, 80 mg, 4x Daily PRN  albuterol sulfate HFA, 2 puff, Q6H PRN  sodium chloride flush, 10 mL, PRN  sodium chloride, 25 mL, PRN  potassium chloride, 10 mEq, PRN  magnesium sulfate, 1,000 mg, PRN  ondansetron, 4 mg, Q8H PRN   Or  ondansetron, 4 mg, Q6H PRN  polyethylene glycol, 17 g, Daily PRN  acetaminophen, 650 mg, Q6H PRN   Or  acetaminophen, 650 mg, Q6H PRN  traMADol, 50 mg, Q6H PRN  glucose, 15 g, PRN  dextrose, 12.5 g, PRN  glucagon (rDNA), 1 mg, PRN  dextrose, 100 mL/hr, PRN  baclofen, 5 mg, Q4H PRN          Electronically signed by Dimitry Gonsalez MD on 10/11/2021 at 7:18 PM

## 2021-10-11 NOTE — PROGRESS NOTES
[address] is a 78 y.o. female on warfarin therapy for Afib. Pharmacy consulted by Dr. Rehan Bell for monitoring and adjustment of treatment. Indication for anticoagulation: Afib  INR goal: 2-3  Warfarin dose prior to admission: 2mg,1mg alternating days     Pertinent Laboratory Values   Recent Labs     10/09/21  0433 10/09/21  0433 10/10/21  0457 10/10/21  0457 10/11/21  0857   INR 2.94   < > 3.21   < > 2.54   HGB 11.9*   < > 11.4*   < > 12.3*   HCT 37.0   < > 35.2*   < > 39.0     --  324  --  329    < > = values in this interval not displayed. INR MONITORING  Recent Labs     10/09/21  0433 10/10/21  0457 10/11/21  0857   INR 2.94 3.21 2.54     Assessment/Plan:   Drug Interactions:   Home meds: levothyroxine   Methylprednisolone and tramadol   Cefepime and Bactrim discontinued   INR now back down into therapeutic range after dose reduction last night to 0.5mg.  Continue with warfarin to 0.5 mg daily tonight and follow INR trends tomorrow. [address] [place] [address] will continue to monitor and adjust warfarin therapy as indicated    Thank you for the consult,  Jovana Marie.  [facility]  10/11/2021 3:01 PM

## 2021-10-11 NOTE — CARE COORDINATION
Met c pt's dghtr- plan remains home with [facility] upon discharge, Yordan Alfaro with sling to be delivered to home today. Dghtr requested stretcher to transport pt home, no pref for provider, has ramp to enter.

## 2021-10-11 NOTE — PROGRESS NOTES
[id number] [place] OF SPEECH/LANGUAGE PATHOLOGY    Leo Cortez [facility]  10/11/2021  [id number]    Attempted to see Leo Sparksford for dysphagia follow-up. Pt soundly sleeping upon SLP entering room. Spoke with pt's daughter, who reports pt has been more lethargic today. Daughter denies concerns with current diet consistencies. Briefly reviewed safe feeding protocol/strategies and recommendations to hold PO when pt is not adequately responsive/alert. Daughter verbalizes understanding/agreement. SLP will monitor chart for further needs.     Isabel Turner MA CCC-SLP  10/11/2021  11:53 AM

## 2021-10-12 ENCOUNTER — APPOINTMENT (OUTPATIENT)
Dept: CT IMAGING | Age: 81
DRG: 689 | End: 2021-10-12
Payer: MEDICARE

## 2021-10-12 LAB
ALBUMIN SERPL-MCNC: 3.7 GM/DL (ref 3.4–5)
ALP BLD-CCNC: 80 IU/L (ref 40–129)
ALT SERPL-CCNC: 8 U/L (ref 10–40)
AMMONIA: 18 UMOL/L (ref 11–51)
ANION GAP SERPL CALCULATED.3IONS-SCNC: 10 MMOL/L (ref 4–16)
AST SERPL-CCNC: 13 IU/L (ref 15–37)
BASOPHILS ABSOLUTE: 0 K/CU MM
BASOPHILS RELATIVE PERCENT: 0.2 % (ref 0–1)
BILIRUB SERPL-MCNC: 0.5 MG/DL (ref 0–1)
BILIRUBIN DIRECT: 0.2 MG/DL (ref 0–0.3)
BILIRUBIN, INDIRECT: 0.3 MG/DL (ref 0–0.7)
BUN BLDV-MCNC: 26 MG/DL (ref 6–23)
CALCIUM SERPL-MCNC: 8.8 MG/DL (ref 8.3–10.6)
CHLORIDE BLD-SCNC: 101 MMOL/L (ref 99–110)
CO2: 26 MMOL/L (ref 21–32)
CREAT SERPL-MCNC: 0.8 MG/DL (ref 0.6–1.1)
DIFFERENTIAL TYPE: ABNORMAL
EOSINOPHILS ABSOLUTE: 0.1 K/CU MM
EOSINOPHILS RELATIVE PERCENT: 0.6 % (ref 0–3)
GFR AFRICAN AMERICAN: >60 ML/MIN/1.73M2
GFR NON-AFRICAN AMERICAN: >60 ML/MIN/1.73M2
GLUCOSE BLD-MCNC: 139 MG/DL (ref 70–99)
GLUCOSE BLD-MCNC: 144 MG/DL (ref 70–99)
GLUCOSE BLD-MCNC: 162 MG/DL (ref 70–99)
GLUCOSE BLD-MCNC: 177 MG/DL (ref 70–99)
GLUCOSE BLD-MCNC: 185 MG/DL (ref 70–99)
GLUCOSE BLD-MCNC: 216 MG/DL (ref 70–99)
HCT VFR BLD CALC: 40 % (ref 37–47)
HEMOGLOBIN: 12.7 GM/DL (ref 12.5–16)
IMMATURE NEUTROPHIL %: 1.3 % (ref 0–0.43)
INR BLD: 2.81 INDEX
LYMPHOCYTES ABSOLUTE: 1.8 K/CU MM
LYMPHOCYTES RELATIVE PERCENT: 21.3 % (ref 24–44)
MAGNESIUM: 2.1 MG/DL (ref 1.8–2.4)
MCH RBC QN AUTO: 32.4 PG (ref 27–31)
MCHC RBC AUTO-ENTMCNC: 31.8 % (ref 32–36)
MCV RBC AUTO: 102 FL (ref 78–100)
MONOCYTES ABSOLUTE: 1.3 K/CU MM
MONOCYTES RELATIVE PERCENT: 14.8 % (ref 0–4)
NUCLEATED RBC %: 0 %
PDW BLD-RTO: 12.4 % (ref 11.7–14.9)
PLATELET # BLD: 341 K/CU MM (ref 140–440)
PMV BLD AUTO: 9.9 FL (ref 7.5–11.1)
POTASSIUM SERPL-SCNC: 4 MMOL/L (ref 3.5–5.1)
PROTHROMBIN TIME: 36.6 SECONDS (ref 11.7–14.5)
RBC # BLD: 3.92 M/CU MM (ref 4.2–5.4)
SEGMENTED NEUTROPHILS ABSOLUTE COUNT: 5.2 K/CU MM
SEGMENTED NEUTROPHILS RELATIVE PERCENT: 61.8 % (ref 36–66)
SODIUM BLD-SCNC: 137 MMOL/L (ref 135–145)
TOTAL IMMATURE NEUTOROPHIL: 0.11 K/CU MM
TOTAL NUCLEATED RBC: 0 K/CU MM
TOTAL PROTEIN: 6.2 GM/DL (ref 6.4–8.2)
WBC # BLD: 8.5 K/CU MM (ref 4–10.5)

## 2021-10-12 PROCEDURE — 99233 SBSQ HOSP IP/OBS HIGH 50: CPT | Performed by: CLINICAL NURSE SPECIALIST

## 2021-10-12 PROCEDURE — 70450 CT HEAD/BRAIN W/O DYE: CPT

## 2021-10-12 PROCEDURE — 6370000000 HC RX 637 (ALT 250 FOR IP): Performed by: SPECIALIST

## 2021-10-12 PROCEDURE — 36415 COLL VENOUS BLD VENIPUNCTURE: CPT

## 2021-10-12 PROCEDURE — 6370000000 HC RX 637 (ALT 250 FOR IP): Performed by: INTERNAL MEDICINE

## 2021-10-12 PROCEDURE — 80076 HEPATIC FUNCTION PANEL: CPT

## 2021-10-12 PROCEDURE — 6370000000 HC RX 637 (ALT 250 FOR IP): Performed by: PHYSICIAN ASSISTANT

## 2021-10-12 PROCEDURE — 85025 COMPLETE CBC W/AUTO DIFF WBC: CPT

## 2021-10-12 PROCEDURE — 6370000000 HC RX 637 (ALT 250 FOR IP): Performed by: HOSPITALIST

## 2021-10-12 PROCEDURE — 1200000000 HC SEMI PRIVATE

## 2021-10-12 PROCEDURE — 80048 BASIC METABOLIC PNL TOTAL CA: CPT

## 2021-10-12 PROCEDURE — 2580000003 HC RX 258: Performed by: SPECIALIST

## 2021-10-12 PROCEDURE — 6370000000 HC RX 637 (ALT 250 FOR IP): Performed by: NURSE PRACTITIONER

## 2021-10-12 PROCEDURE — 2580000003 HC RX 258: Performed by: INTERNAL MEDICINE

## 2021-10-12 PROCEDURE — 85610 PROTHROMBIN TIME: CPT

## 2021-10-12 PROCEDURE — 99223 1ST HOSP IP/OBS HIGH 75: CPT | Performed by: SPECIALIST

## 2021-10-12 PROCEDURE — 82140 ASSAY OF AMMONIA: CPT

## 2021-10-12 PROCEDURE — 83735 ASSAY OF MAGNESIUM: CPT

## 2021-10-12 PROCEDURE — 82962 GLUCOSE BLOOD TEST: CPT

## 2021-10-12 RX ORDER — SODIUM CHLORIDE 9 MG/ML
INJECTION, SOLUTION INTRAVENOUS CONTINUOUS
Status: DISCONTINUED | OUTPATIENT
Start: 2021-10-12 | End: 2021-10-13 | Stop reason: HOSPADM

## 2021-10-12 RX ADMIN — FAMOTIDINE 20 MG: 20 TABLET ORAL at 21:10

## 2021-10-12 RX ADMIN — LUBIPROSTONE 24 MCG: 8 CAPSULE, GELATIN COATED ORAL at 11:13

## 2021-10-12 RX ADMIN — POLYETHYLENE GLYCOL (3350) 17 G: 17 POWDER, FOR SOLUTION ORAL at 11:13

## 2021-10-12 RX ADMIN — PREDNISONE 40 MG: 20 TABLET ORAL at 11:13

## 2021-10-12 RX ADMIN — LACTULOSE 20 G: 10 SOLUTION ORAL at 21:11

## 2021-10-12 RX ADMIN — SODIUM CHLORIDE: 9 INJECTION, SOLUTION INTRAVENOUS at 11:22

## 2021-10-12 RX ADMIN — BACLOFEN 5 MG: 10 TABLET ORAL at 21:17

## 2021-10-12 RX ADMIN — SENNOSIDES AND DOCUSATE SODIUM 2 TABLET: 50; 8.6 TABLET ORAL at 21:09

## 2021-10-12 RX ADMIN — CARBIDOPA AND LEVODOPA 2 TABLET: 25; 100 TABLET ORAL at 21:09

## 2021-10-12 RX ADMIN — SODIUM CHLORIDE, PRESERVATIVE FREE 10 ML: 5 INJECTION INTRAVENOUS at 11:14

## 2021-10-12 RX ADMIN — METFORMIN HYDROCHLORIDE 500 MG: 500 TABLET ORAL at 18:06

## 2021-10-12 RX ADMIN — WATER: 100 IRRIGANT IRRIGATION at 11:23

## 2021-10-12 RX ADMIN — FOLIC ACID 1 MG: 1 TABLET ORAL at 11:14

## 2021-10-12 RX ADMIN — FAMOTIDINE 20 MG: 20 TABLET ORAL at 11:16

## 2021-10-12 RX ADMIN — ACETAMINOPHEN 650 MG: 325 TABLET ORAL at 21:17

## 2021-10-12 RX ADMIN — WARFARIN SODIUM 0.5 MG: 1 TABLET ORAL at 18:06

## 2021-10-12 RX ADMIN — AMANTADINE HYDROCHLORIDE 100 MG: 100 CAPSULE, LIQUID FILLED ORAL at 21:10

## 2021-10-12 RX ADMIN — METOPROLOL TARTRATE 25 MG: 25 TABLET, FILM COATED ORAL at 21:10

## 2021-10-12 RX ADMIN — Medication 6 MG: at 21:17

## 2021-10-12 RX ADMIN — LEVOTHYROXINE SODIUM 50 MCG: 0.05 TABLET ORAL at 11:13

## 2021-10-12 RX ADMIN — CARBIDOPA AND LEVODOPA 1 TABLET: 25; 100 TABLET ORAL at 11:14

## 2021-10-12 RX ADMIN — METOPROLOL TARTRATE 25 MG: 25 TABLET, FILM COATED ORAL at 11:13

## 2021-10-12 RX ADMIN — Medication 1000 UNITS: at 11:14

## 2021-10-12 RX ADMIN — SODIUM CHLORIDE, PRESERVATIVE FREE 10 ML: 5 INJECTION INTRAVENOUS at 21:19

## 2021-10-12 RX ADMIN — LISINOPRIL 10 MG: 10 TABLET ORAL at 11:13

## 2021-10-12 ASSESSMENT — PAIN SCALES - GENERAL: PAINLEVEL_OUTOF10: 3

## 2021-10-12 NOTE — PROGRESS NOTES
Neurology Service Consult Note  [facility]   Patient Name: Kristen Carson [facility]  : 1940        Subjective:   Reason for consult: altered mental status     We were asked to see patient again per request of daughter. She has been admitted since . Daughter reports since we last saw patient on 10/2 patient was showing slow but steady improvement. She reports patient has had issues with a rash after she was started on a new medication requiring her to be placed on steroids. She states the rash improved. She states patient was able to feed herself and sat in a chair a few days ago (required a abram transfer). Since yesterday daughter reports patient has been less responsive and unable to eat or take her medications. Daughter also reports patient has not had a bowel movement in over a week. CT of abdomen reveals large amount of stool in colon. She was given lactulose enema with some success. Patient is seen and examined. She is awake but moaning constantly. She states she is not in pain however. She is able to make eye contact and follow a few simple commands. She is moderately rigid with tremors to UE's. She has missed two doses of her Sinemet. Daughter continues to state she becomes drowsy after she takes the medication. Past Medical History:   Diagnosis Date    Atrial fibrillation ([address] 2016    per holter monitor    Back pain     severe degeneration    Gallstone     H/O cardiovascular stress test 2014    cardiolite-normal, no ischemia, EF70%    H/O Doppler ultrasound 10/1/14    Carotid doppler. No hemodynamically significant stenosis bilaterally.  H/O echocardiogram 2014    EF >57%, mild mitral and tricuspid regurg, normal LV systolic but abnormal diastolic function, no pericardial effusion.     History of echocardiogram 2020    EF 50%, Mild septal asymmetric hypertrophy, Grade II DD, Mildly dilated left atrium, Normal pulmonary artery pressure, no pericardial effusion    History of Holter monitoring 1/27/2016    new onset afib    Hx of Carotid Doppler ultrasound 12/04/2020    Mild 0-49% disease of the bilateral ICA, Normal vertebral flow    Leg cramping     Recurrent urinary tract infection     Spinal stenosis of lumbar region     Vitamin B12 deficiency     Vitamin D deficiency     :   History reviewed. No pertinent surgical history.   Medications:  Scheduled Meds:   lubiprostone  24 mcg Oral BID WC    lisinopril  10 mg Oral Daily    metoprolol tartrate  25 mg Oral BID    warfarin  0.5 mg Oral Daily    predniSONE  40 mg Oral Daily    sennosides-docusate sodium  2 tablet Oral Nightly    polyethylene glycol  17 g Oral Daily    lactulose  20 g Oral BID    carbidopa-levodopa  1 tablet Oral Daily    carbidopa-levodopa  2 tablet Oral BID    amantadine  100 mg Oral Nightly    vitamin D  1 capsule Oral Daily    folic acid  1 mg Oral Daily    levothyroxine  50 mcg Oral Daily    metFORMIN  500 mg Oral Dinner    vitamin B-12  1,000 mcg Oral Every Other Day    sodium chloride flush  10 mL IntraVENous 2 times per day    insulin lispro  0-6 Units SubCUTAneous TID     insulin lispro  0-3 Units SubCUTAneous Nightly    famotidine  20 mg Oral BID     Continuous Infusions:   sodium chloride 100 mL/hr at 10/12/21 1122    dextrose       PRN Meds:.melatonin, phenol, calcium carbonate, simethicone, albuterol sulfate HFA, sodium chloride flush, potassium chloride, magnesium sulfate, ondansetron **OR** ondansetron, polyethylene glycol, acetaminophen **OR** acetaminophen, traMADol, glucose, dextrose, glucagon (rDNA), dextrose, baclofen    Allergies   Allergen Reactions    Ciprofibrate     Ciprofloxacin Other (See Comments)     Anorexia      Pregabalin      Social History     Socioeconomic History    Marital status:      Spouse name: Not on file    Number of children: Not on file    Years of education: Not on file    Highest education level: Not on file   Occupational History    Not on file   Tobacco Use    Smoking status: Never Smoker    Smokeless tobacco: Never Used   Vaping Use    Vaping Use: Never used   Substance and Sexual Activity    Alcohol use: No     Comment: caffeine 1 pop a day    Drug use: No    Sexual activity: Not on file   Other Topics Concern    Not on file   Social History Narrative    Not on file     Social Determinants of Health     Financial Resource Strain: Low Risk     Difficulty of Paying Living Expenses: Not hard at all   Food Insecurity: No Food Insecurity    Worried About Running Out of Food in the Last Year: Never true    Melanie of Food in the Last Year: Never true   Transportation Needs:     Lack of Transportation (Medical):  Lack of Transportation (Non-Medical):    Physical Activity:     Days of Exercise per Week:     Minutes of Exercise per Session:    Stress:     Feeling of Stress :    Social Connections:     Frequency of Communication with Friends and Family:     Frequency of Social Gatherings with Friends and Family:     Attends Faith Services:     Active Member of Clubs or Organizations:     Attends Club or Organization Meetings:     Marital Status:    Intimate Partner Violence:     Fear of Current or Ex-Partner:     Emotionally Abused:     Physically Abused:     Sexually Abused:       Family History   Problem Relation Age of Onset    High Blood Pressure Sister     Heart Disease Brother        Review of Symptoms:    10-point system review completed. All of which are negative except as mentioned above. Physical Exam:       Gen: Awake, minimal verbalization  HEENT: NC/AT, EOMI, PERRL, mmm, neck supple, no meningeal signs  Heart: tachycardic per monitor  Lungs: No respiratory distress  Ext: no edema, no calf tenderness b/l  Psych: normal mood and affect  Skin: no rashes or lesions    NEUROLOGIC EXAM:    Mental Status: Awake , minimal verbalization. Voice is soft. daily to 2 tablets in the morning, 1 tablet mid day and 2 tablets at night. It is important patient receives her medications as scheduled. · Management of constipation per medicine team and GI  · PT/OT/ST per their recommendations  · Patient established with our office, She has a scheduled appointment with us on Tuesday. Will email our office to see if we can reschedule as a later date  · Discussed at length with daughter it is to be expected for patients with underlying neurodegenerative disease to become more altered in hospital and expect fluctuations. Patient care discussed with attending physician, Dr. Brian Rosen. Thank you for allowing us to participate in the care of your patient. If there are any questions regarding evaluation please feel free to contact us.      BRENNEN Vo - CNS, 10/12/2021

## 2021-10-12 NOTE — PLAN OF CARE
Nutrition Problem #1: Inadequate oral intake  Intervention: Food and/or Nutrient Delivery: Continue Current Diet, Modify Oral Nutrition Supplement  Nutritional Goals: Pt will consume greater than half of her meals and supplements

## 2021-10-12 NOTE — PROGRESS NOTES
Comprehensive Nutrition Assessment    Type and Reason for Visit:  Reassess    Nutrition Recommendations/Plan:   · Diet consistency per SLP   · Modify high protein supplements   · Encourage adequate fluids, continue bowel regimen   · Monitor cognitive status, nutrition status and poc    Nutrition Assessment:  SLP has advanced pt's diet to soft and bite sized with thins. Unable to assess po intake, pt busy with physician care at visit. Remains with worsening AMS. GI seen pt for constipation, still no BM noted. Recommend high fiber intake and adequate fluids. Appreciate total assist to aid pt to meet needs. Follow as high nutrition. Malnutrition Assessment:  Malnutrition Status: At risk for malnutrition (Comment)    Context:  Acute Illness       Estimated Daily Nutrient Needs:  Energy (kcal):  8596-9494 (Trimble-St Jeor); Weight Used for Energy Requirements:  Current     Protein (g):  52-63 (1-1.2 g/kg IBW); Weight Used for Protein Requirements:  Ideal        Fluid (ml/day):  5498-2830; Method Used for Fluid Requirements:  1 ml/kcal      Nutrition Related Findings:  Glu 177, A1C 5.3  Rx: glycolax     Wounds:  None       Current Nutrition Therapies:    Adult Oral Nutrition Supplement; Fortified Pudding Oral Supplement  Adult Oral Nutrition Supplement; Frozen Oral Supplement  ADULT DIET; Dysphagia - Soft and Bite Sized    Anthropometric Measures:  · Height: 5' 3\" (160 cm)  · Current Body Weight: 179 lb 7.3 oz (81.4 kg)   · Admission Body Weight: 180 lb 11.2 oz (82 kg)    · Usual Body Weight: 190 lb (86.2 kg) (5/17/21)     · Ideal Body Weight: 115 lbs; % Ideal Body Weight 163.6 %   · BMI: 31.8  · BMI Categories: Obese Class 1 (BMI 30.0-34. 9)       Nutrition Diagnosis:   · Inadequate oral intake related to cognitive or neurological impairment, swallowing difficulty as evidenced by intake 26-50%    Nutrition Interventions:   Food and/or Nutrient Delivery:  Continue Current Diet, Modify Oral Nutrition Supplement  Nutrition Education/Counseling:  No recommendation at this time   Coordination of Nutrition Care:  Continue to monitor while inpatient, Feeding Assistance/Environment Change, Speech Therapy, Swallow Evaluation, Coordination of Community Care    Goals:  Pt will consume greater than half of her meals and supplements       Nutrition Monitoring and Evaluation:   Behavioral-Environmental Outcomes:  None Identified   Food/Nutrient Intake Outcomes:  Diet Advancement/Tolerance, Food and Nutrient Intake, Supplement Intake  Physical Signs/Symptoms Outcomes:  Biochemical Data, Chewing or Swallowing, Constipation, GI Status, Meal Time Behavior, Skin, Weight     Discharge Planning:     Too soon to determine     Electronically signed by Sherron Delgado RD, LD on 10/12/21 at 3:22 PM EDT    Contact: [phone]

## 2021-10-12 NOTE — PROGRESS NOTES
[address] is a 78 y.o. female on warfarin therapy for Afib. Pharmacy consulted by Dr. Melonie Strong for monitoring and adjustment of treatment. Indication for anticoagulation: Afib  INR goal: 2-3  Warfarin dose prior to admission: 2mg,1mg alternating days     Pertinent Laboratory Values   Recent Labs     10/10/21  0457 10/10/21  0457 10/11/21  0857 10/11/21  0857 10/12/21  0757   INR 3.21   < > 2.54   < > 2.81   HGB 11.4*   < > 12.3*   < > 12.7   HCT 35.2*   < > 39.0   < > 40.0     --  329  --  341    < > = values in this interval not displayed. INR MONITORING  Recent Labs     10/10/21  0457 10/11/21  0857 10/12/21  0757   INR 3.21 2.54 2.81     Assessment/Plan:   Drug Interactions:   Home meds: levothyroxine   Methylprednisolone and tramadol   Cefepime and Bactrim discontinued   INR remains therapeutic, but may be starting to trend upward again.  Continue with warfarin to 0.5 mg daily tonight and follow INR trends tomorrow. Will see if INR trends up over 3 before looking to hold anymore doses. [address] [place] [address] Avenue will continue to monitor and adjust warfarin therapy as indicated    Thank you for the consult,  Manoj Wilkerson.  Armida Giles, [facility]  10/12/2021 3:37 PM

## 2021-10-12 NOTE — CONSULTS
Department of Internal Medicine  Gastroenterology Consult Note  Amado Newsome MD      Reason for Consult:  constipation    Primary Care Physician:  Andrew Kern MD    History Obtained From:  daughter    HISTORY OF PRESENT ILLNESS:              The patient is a 78 y.o.  female with long-standing Parkinson's admitted with weakness, confusion, UTI and atrial fib with RVR. I was asked to see for a recent increase in her chronic constipation. She has had a long hospitalization complicated by metabolic encephalopathy, delirium and a severe drug rash. She has not had a BM in over a week. Two days ago she was talkng and eating and now she is barely arousabke with virtually no oral intake and no IV fluid    Past Medical History:        Diagnosis Date    Atrial fibrillation ([address] 01/27/2016    per holter monitor    Back pain     severe degeneration    Gallstone     H/O cardiovascular stress test 8/12/2014    cardiolite-normal, no ischemia, EF70%    H/O Doppler ultrasound 10/1/14    Carotid doppler. No hemodynamically significant stenosis bilaterally.  H/O echocardiogram 08/12/2014    EF >57%, mild mitral and tricuspid regurg, normal LV systolic but abnormal diastolic function, no pericardial effusion.  History of echocardiogram 12/04/2020    EF 50%, Mild septal asymmetric hypertrophy, Grade II DD, Mildly dilated left atrium, Normal pulmonary artery pressure, no pericardial effusion    History of Holter monitoring 1/27/2016    new onset afib    Hx of Carotid Doppler ultrasound 12/04/2020    Mild 0-49% disease of the bilateral ICA, Normal vertebral flow    Leg cramping     Recurrent urinary tract infection     Spinal stenosis of lumbar region     Vitamin B12 deficiency     Vitamin D deficiency        Past Surgical History:    History reviewed. No pertinent surgical history. Medications Prior to Admission:    Prior to Admission medications    Medication Sig Start Date End Date Taking? Authorizing Provider   carbidopa-levodopa (SINEMET)  MG per tablet Take 2 tablets by mouth 3 times daily 09/29/21 Daughter reports this dosing is a trial basis. patient was receiving Stalevo -200 mg tablets TID. Per daughter patient appears to groggy with the 2 tables TID and started decreasing dosage. 9/14/21  Yes Historical Provider, MD   folic acid (FOLVITE) 1 MG tablet TAKE ONE (1) TABLET BY MOUTH ONCE DAILY 7/20/21  Yes Socorro Wang MD   metFORMIN (GLUCOPHAGE) 500 MG tablet 1 with evening meds. Patient taking differently: Take 500 mg by mouth Daily with supper 1 with evening meds. 4/16/21  Yes Bettie Zhou PA-C   levothyroxine (SYNTHROID) 50 MCG tablet Take 1 tablet by mouth Daily 4/16/21 10/13/21 Yes Bettie Zhou PA-C   warfarin (COUMADIN) 2 MG tablet TAKE 1 TABLET BY MOUTH ONCE DAILY  Patient taking differently: Take by mouth See Admin Instructions 09/29/21 Patient alternates b/t 2 mg and 1 mg every other day, takes at HS last dose 09/28/21 1 mg 4/16/21  Yes Bettie Zhou PA-C   amantadine (SYMMETREL) 100 MG capsule Take 1 capsule by mouth daily  Patient taking differently: Take 100 mg by mouth nightly  3/12/21  Yes Socorro Wang MD   baclofen (LIORESAL) 10 MG tablet TAKE ONE-HALF (1/2) TABLET BY MOUTH SIX (6) TIMES A DAY AND ONE (1) TABLET BY MOUTH IN EVENING  Patient taking differently: Take 5 mg by mouth every 4 hours as needed Patient does not take this routinely very PRN 4/27/20  Yes Socorro Wang MD   vitamin B-12 (CYANOCOBALAMIN) 1000 MCG tablet Take 1,000 mcg by mouth every other day    Yes Historical Provider, MD   Cholecalciferol (VITAMIN D) 2000 UNITS CAPS capsule Take 1 capsule by mouth.    Yes Historical Provider, MD   Accu-Chek Softclix Lancets MISC USE 1 LANCET ONCE A DAY 3/23/21 9/7/21  Socorro Wang MD   blood glucose test strips (ACCU-CHEK HIEN PLUS) strip TEST BLOOD SUGAR DAILY AS NEEDED 12/28/20   Socorro Wang MD Allergies: Allergies   Allergen Reactions    Ciprofibrate     Ciprofloxacin Other (See Comments)     Anorexia      Pregabalin    . Social History:    TOBACCO:  No  ETOH:  No    Family History:   Family History   Problem Relation Age of Onset    High Blood Pressure Sister     Heart Disease Brother        REVIEW OF SYSTEMS: (POSITIVES WILL BE UNDERLINED)  CONSTITUTIONAL:    Weight change,fatigue, fever, chills  EYES:  Diplopia, change in vision  EARS:  hearing loss, tinnitus, vertigo  NOSE:   epistaxis  MOUTH/THROAT:     hoarseness, sore throat. RESPIRATORY:  SOB,  cough, sputum, hemoptysis  CARDIOVASCULAR : chest pain,palpitations, dyspnea exertion, orthopnea, paroxysmal nocturnal dyspnea, pedal edema. GASTROINTESTINAL:  See HPI  GENITOURINARY:   dysuria, hematuria, . HEMATOLOGIC/LYMPHATIC:   Anemia, bleeding tendencies. MUSCULOSKELETAL:    myalgias,  joint pain  NEUROLOGICAL:   Loss of Consciousness, paresthesias, anesthesias, focal weakness  SKIN :   History of dermatitis, rashes  PSYCHIATRIC:  depression, , anxiety past psychosis  ENDOCRINE:  History of diabetes, thyroid disease  ALL/IMM : allergies, rashes    PHYSICAL EXAM:    Vitals:  /72   Pulse 74   Temp 97.8 °F (36.6 °C) (Oral)   Resp 16   Ht 5' 3\" (1.6 m)   Wt 179 lb 9 oz (81.4 kg)   SpO2 95%   BMI 31.81 kg/m²   CONSTITUTIONAL: opens eyes to stimulation but non-communicative  EYES:  pupils equal, round and reactive to light and sclera clear  ENT:  normocepalic, without obvious abnormality  NECK:  supple, symmetrical, trachea midline  HEMATOLOGIC/LYMPHATICS:  no cervical lymphadenopathy and no supraclavicular lymphadenopathy  LUNGS:  clear to auscultation  CARDIOVASCULAR:  regular rate and rhythm and no murmur noted  ABDOMEN:  Soft, non-tender with normal bowel sounds.  No organomegaly or masses  NEUROLOGIC: no focal deficit detected  SKIN:  no lesions  EXTREMITIES: no clubbing, cyanosis, or edema    IMPRESSION:  1) chronic constipation with acute worsening  2) altered mental state- seems to be worsening  3) volume depletion  4) multiple medical co-morbidities as described above      RECOMMENDATIONS:  1) needs IV hydration as not taking po- will start NS at 100 cc/hr until new Hospitalist evaluates today and changes at their discretion  2) lactulose enema  3) started Amitiza 24 mcg BID  4) may want to have neuro see again in follow up due to worsened mental state        Yobani Martinez M.D

## 2021-10-12 NOTE — PROGRESS NOTES
Hospitalist Progress Note      Name:  Preston Tanner [facility] /Age/Sex: 1940  (78 y.o. female)   MRN & CSN:  [id number] & [id number] Admission Date/Time: 2021 11:26 AM   Location:  [address] [place] [address] PCP: Leonard Elizondo MD         Hospital Day: 14    Assessment and Plan:   Marly Carvajal is a 78 y.o.  female with past medical history of Parkinson's disease who was admitted for A. fib with rapid ventricular response, confusion and found to have urinary tract infection. Urine culture came back positive for Klebsiella. Patient remains confused. Patient daughter was concerned about abnormal extension of a tissue from her femalegenitalia and wanted OB/GYN to see patient. OB/GYN consult pending. 1.         Paroxysmal atrial fibrillation with rapid ventricular response: Rate controlled  2. Urinary tract infection with urine culture being positive for Klebsiella  3. Metabolic encephalopathy & hospital delirium  4. History of Parkinson's disease  5. Other medical problem include diabetes, hypothyroidism  6. Abnormal skin extension from genitalia  7. Hypomagnesemia resolved  8. Maculopapular Rash--->allergic reaction, improving with steroids, switch to PO tomorrow   9. Constipation-->refractory to several multiple attempts with different agents over the past few days, CT abd/pelvis w/o contrast to rule out possible fecal impaction.  GI consulted, appreciate recs     Plan:  Rash likely secondary to ?bactrim--->much improved with steroids, transition to PO steroids   Slight bradycardia noted-->metoprolol dosage adjusted  AMS improving today, patient more awake and alert    Family states patient reporting throat burning--->add spray, acid reflux meds-->resolved  Delirium--patient oscillating btwn hyperactive and hypoactive delirium   Will take mental status time to recover after hospitalization  Consider palliative care for patient, maybe even on outpatient (36.6 °C)   SpO2: 93%     Physical Exam:   GEN  female, somnolent lying in bed, Appears given age. EYES Pupils are equally round. No scleral erythema, discharge, or conjunctivitis. HENT Mucous membranes are moist. Oral pharynx without exudates, no evidence of thrush. RESP Clear to auscultation, no wheezes, rales or rhonchi. Symmetric chest movement while on room air. CARDIO/VASC S1/S2 auscultated. Regular rate without appreciable murmurs, rubs, or gallops. GI Abdomen is soft without significant tenderness, masses, or guarding. MSK No gross joint deformities. SKIN Normal coloration, warm, dry. NEURO Moves all four extremities, weaker on left side baseline per family  PSYCH Awake, alert, oriented x 1. Affect appropriate.     Medications:   Medications:    lubiprostone  24 mcg Oral BID WC    lisinopril  10 mg Oral Daily    metoprolol tartrate  25 mg Oral BID    warfarin  0.5 mg Oral Daily    predniSONE  40 mg Oral Daily    sennosides-docusate sodium  2 tablet Oral Nightly    polyethylene glycol  17 g Oral Daily    lactulose  20 g Oral BID    carbidopa-levodopa  1 tablet Oral Daily    carbidopa-levodopa  2 tablet Oral BID    amantadine  100 mg Oral Nightly    vitamin D  1 capsule Oral Daily    folic acid  1 mg Oral Daily    levothyroxine  50 mcg Oral Daily    metFORMIN  500 mg Oral Dinner    vitamin B-12  1,000 mcg Oral Every Other Day    sodium chloride flush  10 mL IntraVENous 2 times per day    insulin lispro  0-6 Units SubCUTAneous TID     insulin lispro  0-3 Units SubCUTAneous Nightly    famotidine  20 mg Oral BID      Infusions:    sodium chloride 100 mL/hr at 10/12/21 1122    dextrose       PRN Meds: melatonin, 6 mg, Nightly PRN  phenol, 1 spray, Q2H PRN  calcium carbonate, 500 mg, TID PRN  simethicone, 80 mg, 4x Daily PRN  albuterol sulfate HFA, 2 puff, Q6H PRN  sodium chloride flush, 10 mL, PRN  potassium chloride, 10 mEq, PRN  magnesium sulfate, 1,000 mg, PRN  ondansetron, 4 mg, Q8H PRN   Or  ondansetron, 4 mg, Q6H PRN  polyethylene glycol, 17 g, Daily PRN  acetaminophen, 650 mg, Q6H PRN   Or  acetaminophen, 650 mg, Q6H PRN  traMADol, 50 mg, Q6H PRN  glucose, 15 g, PRN  dextrose, 12.5 g, PRN  glucagon (rDNA), 1 mg, PRN  dextrose, 100 mL/hr, PRN  baclofen, 5 mg, Q4H PRN          Electronically signed by Precious Espinoza MD on 10/12/2021 at 6:46 PM

## 2021-10-13 VITALS
DIASTOLIC BLOOD PRESSURE: 73 MMHG | SYSTOLIC BLOOD PRESSURE: 139 MMHG | RESPIRATION RATE: 16 BRPM | HEART RATE: 85 BPM | HEIGHT: 63 IN | TEMPERATURE: 97.7 F | OXYGEN SATURATION: 95 % | BODY MASS INDEX: 31.11 KG/M2 | WEIGHT: 175.6 LBS

## 2021-10-13 LAB
ANION GAP SERPL CALCULATED.3IONS-SCNC: 9 MMOL/L (ref 4–16)
BASOPHILS ABSOLUTE: 0 K/CU MM
BASOPHILS RELATIVE PERCENT: 0.3 % (ref 0–1)
BUN BLDV-MCNC: 24 MG/DL (ref 6–23)
CALCIUM SERPL-MCNC: 8 MG/DL (ref 8.3–10.6)
CHLORIDE BLD-SCNC: 107 MMOL/L (ref 99–110)
CO2: 22 MMOL/L (ref 21–32)
CREAT SERPL-MCNC: 0.5 MG/DL (ref 0.6–1.1)
DIFFERENTIAL TYPE: ABNORMAL
EOSINOPHILS ABSOLUTE: 0.1 K/CU MM
EOSINOPHILS RELATIVE PERCENT: 0.7 % (ref 0–3)
GFR AFRICAN AMERICAN: >60 ML/MIN/1.73M2
GFR NON-AFRICAN AMERICAN: >60 ML/MIN/1.73M2
GLUCOSE BLD-MCNC: 114 MG/DL (ref 70–99)
GLUCOSE BLD-MCNC: 116 MG/DL (ref 70–99)
GLUCOSE BLD-MCNC: 202 MG/DL (ref 70–99)
HCT VFR BLD CALC: 29.7 % (ref 37–47)
HEMOGLOBIN: 9.5 GM/DL (ref 12.5–16)
IMMATURE NEUTROPHIL %: 1.9 % (ref 0–0.43)
INR BLD: 2.91 INDEX
LYMPHOCYTES ABSOLUTE: 2.2 K/CU MM
LYMPHOCYTES RELATIVE PERCENT: 21.1 % (ref 24–44)
MCH RBC QN AUTO: 32.6 PG (ref 27–31)
MCHC RBC AUTO-ENTMCNC: 32 % (ref 32–36)
MCV RBC AUTO: 102.1 FL (ref 78–100)
MONOCYTES ABSOLUTE: 1.2 K/CU MM
MONOCYTES RELATIVE PERCENT: 11.7 % (ref 0–4)
NUCLEATED RBC %: 0 %
PDW BLD-RTO: 12.2 % (ref 11.7–14.9)
PLATELET # BLD: 384 K/CU MM (ref 140–440)
PMV BLD AUTO: 9.7 FL (ref 7.5–11.1)
POTASSIUM SERPL-SCNC: 4.2 MMOL/L (ref 3.5–5.1)
PROTHROMBIN TIME: 38 SECONDS (ref 11.7–14.5)
RBC # BLD: 2.91 M/CU MM (ref 4.2–5.4)
SEGMENTED NEUTROPHILS ABSOLUTE COUNT: 6.6 K/CU MM
SEGMENTED NEUTROPHILS RELATIVE PERCENT: 64.3 % (ref 36–66)
SODIUM BLD-SCNC: 138 MMOL/L (ref 135–145)
TOTAL IMMATURE NEUTOROPHIL: 0.19 K/CU MM
TOTAL NUCLEATED RBC: 0 K/CU MM
WBC # BLD: 10.3 K/CU MM (ref 4–10.5)

## 2021-10-13 PROCEDURE — 6370000000 HC RX 637 (ALT 250 FOR IP): Performed by: HOSPITALIST

## 2021-10-13 PROCEDURE — 6370000000 HC RX 637 (ALT 250 FOR IP): Performed by: PHYSICIAN ASSISTANT

## 2021-10-13 PROCEDURE — 85025 COMPLETE CBC W/AUTO DIFF WBC: CPT

## 2021-10-13 PROCEDURE — 94761 N-INVAS EAR/PLS OXIMETRY MLT: CPT

## 2021-10-13 PROCEDURE — 36415 COLL VENOUS BLD VENIPUNCTURE: CPT

## 2021-10-13 PROCEDURE — 80048 BASIC METABOLIC PNL TOTAL CA: CPT

## 2021-10-13 PROCEDURE — 99231 SBSQ HOSP IP/OBS SF/LOW 25: CPT | Performed by: SPECIALIST

## 2021-10-13 PROCEDURE — 85610 PROTHROMBIN TIME: CPT

## 2021-10-13 PROCEDURE — 82962 GLUCOSE BLOOD TEST: CPT

## 2021-10-13 PROCEDURE — 6370000000 HC RX 637 (ALT 250 FOR IP): Performed by: INTERNAL MEDICINE

## 2021-10-13 PROCEDURE — 6370000000 HC RX 637 (ALT 250 FOR IP): Performed by: SPECIALIST

## 2021-10-13 PROCEDURE — 2580000003 HC RX 258: Performed by: SPECIALIST

## 2021-10-13 PROCEDURE — 99232 SBSQ HOSP IP/OBS MODERATE 35: CPT | Performed by: CLINICAL NURSE SPECIALIST

## 2021-10-13 RX ORDER — FAMOTIDINE 20 MG/1
20 TABLET, FILM COATED ORAL 2 TIMES DAILY
Qty: 60 TABLET | Refills: 2 | Status: SHIPPED | OUTPATIENT
Start: 2021-10-13 | End: 2021-11-10

## 2021-10-13 RX ORDER — WARFARIN SODIUM 1 MG/1
0.5 TABLET ORAL DAILY
Status: DISCONTINUED | OUTPATIENT
Start: 2021-10-14 | End: 2021-10-13 | Stop reason: HOSPADM

## 2021-10-13 RX ORDER — SENNA AND DOCUSATE SODIUM 50; 8.6 MG/1; MG/1
2 TABLET, FILM COATED ORAL NIGHTLY
Qty: 30 TABLET | Refills: 2 | Status: SHIPPED | OUTPATIENT
Start: 2021-10-13

## 2021-10-13 RX ORDER — POLYETHYLENE GLYCOL 3350 17 G/17G
17 POWDER, FOR SOLUTION ORAL DAILY
Qty: 527 G | Refills: 1 | Status: SHIPPED | OUTPATIENT
Start: 2021-10-14 | End: 2021-11-13

## 2021-10-13 RX ORDER — LUBIPROSTONE 24 UG/1
24 CAPSULE, GELATIN COATED ORAL 2 TIMES DAILY WITH MEALS
Qty: 60 CAPSULE | Refills: 2 | Status: SHIPPED | OUTPATIENT
Start: 2021-10-13 | End: 2021-11-10

## 2021-10-13 RX ORDER — LANOLIN ALCOHOL/MO/W.PET/CERES
6 CREAM (GRAM) TOPICAL NIGHTLY PRN
Qty: 60 TABLET | Refills: 2 | Status: SHIPPED | OUTPATIENT
Start: 2021-10-13

## 2021-10-13 RX ORDER — WARFARIN SODIUM 1 MG/1
TABLET ORAL
Qty: 30 TABLET | Refills: 3 | Status: SHIPPED | OUTPATIENT
Start: 2021-10-13 | End: 2021-11-10 | Stop reason: SDUPTHER

## 2021-10-13 RX ORDER — LISINOPRIL 10 MG/1
10 TABLET ORAL DAILY
Qty: 30 TABLET | Refills: 2 | Status: SHIPPED | OUTPATIENT
Start: 2021-10-14 | End: 2022-01-07 | Stop reason: SDUPTHER

## 2021-10-13 RX ADMIN — METOPROLOL TARTRATE 25 MG: 25 TABLET, FILM COATED ORAL at 09:11

## 2021-10-13 RX ADMIN — Medication 1000 UNITS: at 09:11

## 2021-10-13 RX ADMIN — CARBIDOPA AND LEVODOPA 1 TABLET: 25; 100 TABLET ORAL at 12:58

## 2021-10-13 RX ADMIN — CARBIDOPA AND LEVODOPA 2 TABLET: 25; 100 TABLET ORAL at 09:11

## 2021-10-13 RX ADMIN — POLYETHYLENE GLYCOL (3350) 17 G: 17 POWDER, FOR SOLUTION ORAL at 09:10

## 2021-10-13 RX ADMIN — SODIUM CHLORIDE: 9 INJECTION, SOLUTION INTRAVENOUS at 09:09

## 2021-10-13 RX ADMIN — LISINOPRIL 10 MG: 10 TABLET ORAL at 09:11

## 2021-10-13 RX ADMIN — LACTULOSE 20 G: 10 SOLUTION ORAL at 09:10

## 2021-10-13 RX ADMIN — FAMOTIDINE 20 MG: 20 TABLET ORAL at 09:11

## 2021-10-13 RX ADMIN — LUBIPROSTONE 24 MCG: 8 CAPSULE, GELATIN COATED ORAL at 09:37

## 2021-10-13 RX ADMIN — PREDNISONE 40 MG: 20 TABLET ORAL at 09:11

## 2021-10-13 RX ADMIN — CYANOCOBALAMIN TAB 1000 MCG 1000 MCG: 1000 TAB at 09:11

## 2021-10-13 RX ADMIN — FOLIC ACID 1 MG: 1 TABLET ORAL at 09:11

## 2021-10-13 ASSESSMENT — PAIN SCALES - GENERAL: PAINLEVEL_OUTOF10: 0

## 2021-10-13 NOTE — PROGRESS NOTES
Daughter says she had good results from the lactulose enema and even ate some last night  Not very responsive this am- neuro has seen- no obviuous metabolic drerangement  Abdomen soft, non-tender, non-distended  Impression:    1) constipation    2) altered mental state -?  Parkinson's, meds or just due to acute illness      Plan:   1) continue Amitiza- adjust dose as needed   2) add Miralax once able to take liquids safely

## 2021-10-13 NOTE — PROGRESS NOTES
[address] is a 78 y.o. female on warfarin therapy for Afib. Pharmacy consulted by Dr. Jocelyn Goldman for monitoring and adjustment of treatment. Indication for anticoagulation: Afib  INR goal: 2-3  Warfarin dose prior to admission: 2mg,1mg alternating days     Pertinent Laboratory Values   Recent Labs     10/11/21  0857 10/11/21  0857 10/12/21  0757 10/12/21  0757 10/13/21  0634   INR 2.54   < > 2.81   < > 2.91   HGB 12.3*   < > 12.7   < > 9.5*   HCT 39.0   < > 40.0   < > 29.7*     --  341  --  384    < > = values in this interval not displayed. INR MONITORING  Recent Labs     10/11/21  0857 10/12/21  0757 10/13/21  0634   INR 2.54 2.81 2.91     Assessment/Plan:   Drug Interactions: none at this time    INR trending up   Hold warfarin tonight, resume tomorrow at 0.5mg.  Patient may need every other day dosing    Pharmacy will continue to monitor and adjust warfarin therapy as indicated    Thank you for the consult,  Rosette Alexander, [address] [facility]  10/13/2021 8:37 AM

## 2021-10-13 NOTE — PROGRESS NOTES
Neurology Service Consult Note  [facility]   Patient Name: Padmini Walls [facility]  : 1940        Subjective:   Reason for consult: altered mental status     Patient is seen and examined. Chart reviewed in detail. Patient has had 2 large BM's according to nursing and daughter. Daughter reports patient was more awake and interactive last night and this morning. She states she was able to eat a small amount. Discussed repeat CT of head. Past Medical History:   Diagnosis Date    Atrial fibrillation ([address] 2016    per holter monitor    Back pain     severe degeneration    Gallstone     H/O cardiovascular stress test 2014    cardiolite-normal, no ischemia, EF70%    H/O Doppler ultrasound 10/1/14    Carotid doppler. No hemodynamically significant stenosis bilaterally.  H/O echocardiogram 2014    EF >57%, mild mitral and tricuspid regurg, normal LV systolic but abnormal diastolic function, no pericardial effusion.  History of echocardiogram 2020    EF 50%, Mild septal asymmetric hypertrophy, Grade II DD, Mildly dilated left atrium, Normal pulmonary artery pressure, no pericardial effusion    History of Holter monitoring 2016    new onset afib    Hx of Carotid Doppler ultrasound 2020    Mild 0-49% disease of the bilateral ICA, Normal vertebral flow    Leg cramping     Recurrent urinary tract infection     Spinal stenosis of lumbar region     Vitamin B12 deficiency     Vitamin D deficiency     :   History reviewed. No pertinent surgical history.   Medications:  Scheduled Meds:   [START ON 10/14/2021] warfarin  0.5 mg Oral Daily    lubiprostone  24 mcg Oral BID WC    lisinopril  10 mg Oral Daily    metoprolol tartrate  25 mg Oral BID    predniSONE  40 mg Oral Daily    sennosides-docusate sodium  2 tablet Oral Nightly    polyethylene glycol  17 g Oral Daily    lactulose  20 g Oral BID    carbidopa-levodopa  1 tablet Oral Daily    (Non-Medical):    Physical Activity:     Days of Exercise per Week:     Minutes of Exercise per Session:    Stress:     Feeling of Stress :    Social Connections:     Frequency of Communication with Friends and Family:     Frequency of Social Gatherings with Friends and Family:     Attends [employer] Services:     Active Member of Clubs or Organizations:     Attends Club or Organization Meetings:     Marital Status:    Intimate Partner Violence:     Fear of Current or Ex-Partner:     Emotionally Abused:     Physically Abused:     Sexually Abused:       Family History   Problem Relation Age of Onset    High Blood Pressure Sister     Heart Disease Brother        Review of Symptoms:    10-point system review completed. All of which are negative except as mentioned above. Physical Exam:       Gen: Awake, minimal verbalization  HEENT: NC/AT, EOMI, PERRL, mmm, neck supple, no meningeal signs  Heart: tachycardic per monitor  Lungs: No respiratory distress  Ext: no edema, no calf tenderness b/l  Psych: normal mood and affect  Skin: no rashes or lesions    NEUROLOGIC EXAM:    Mental Status: Awake , minimal verbalization. Voice is soft. Followed few simple commands.      Cranial Nerve Exam:   CN II-XII: PERRL, VFF, no nystagmus, no gaze paresis, sensation V1-V3 intact b/l, muscles of facial expression symmetric; hearing intact to conversational tone,   Motor Exam:       Movement antigravity in all extremities, I do appreciate moderate rigidity in all extremities    Deep Tendon Reflexes: 2/4 biceps, triceps, brachioradialis, patellar, and achilles b/l; flexor plantar responses b/l    Sensation: Intact light touch/vibration UE's/LE's b/l    Coordination/Cerebellum:       Tremors--Parkinsonian tremor to UE's    Gait and stance:      Gait: deferred      LABS:     Recent Labs     10/11/21  0857 10/12/21  0757 10/13/21  0634   WBC 10.6* 8.5 10.3    137 138   K 4.6 4.0 4.2    101 107   CO2 22 26 22   BUN 31* 26* 24*   CREATININE 0.5* 0.8 0.5*   GLUCOSE 179* 139* 114*   INR 2.54 2.81 2.91          IMAGING:    CT Of head 10/12/21:  No acute intracranial abnormality.           CT Head  1. No acute intracranial abnormality.         Above imaging personally reviewed. ASSESSMENT/PLAN:   79 yo female with history of Atrial fibrillation, vitamin D deficiency, Vitamin B12 deficiency, questionable parkinson's disease vs. Parkinsonism presents with irregular heart rate secondary to atrial fibrillation. Her hospital stay has been complicated by UTI, drug induced rash, and constipation. Neurology was asked to see patient again due to worsening mental status. Daughter reports she was more interactive last night and this morning. 1. Increased confusion and lethargy secondary to encephalopathy and delirium superimposed on constipation, prolonged hospitalization and low cognitive reserve. · CT head: see above. Repeat CT unchanged  · LFT's and Ammonia unremarkable  · Consider repeating UA  · Continue Sinemet 25/100 mg 2 tablets three times daily to 2 tablets in the morning, 1 tablet mid day and 2 tablets at night. It is important patient receives her medications as scheduled. · Management of constipation per medicine team and GI  · PT/OT/ST per their recommendations  · Patient established with our office, she will follow up as planned. · Discussed at length with daughter it is to be expected for patients with underlying neurodegenerative disease to become more altered in hospital and expect fluctuations. Nothing further from our standpoint. We will sign off. Patient care discussed with attending physician, Dr. Dina Demarco. Thank you for allowing us to participate in the care of your patient. If there are any questions regarding evaluation please feel free to contact us.      BRENNEN Nettles - CNS, 10/13/2021

## 2021-10-13 NOTE — PROGRESS NOTES
Pt discharged home with family at this time. Discharge instructions completed with daughter; voices understanding. Pt transported via stretcher by [facility] to follow as prior to admission.

## 2021-10-13 NOTE — PROGRESS NOTES
[facility] Liaison spoke with pts daughter & is aware of discharge & will initiate Emma Mcintosh.

## 2021-10-14 ENCOUNTER — CARE COORDINATION (OUTPATIENT)
Dept: CASE MANAGEMENT | Age: 81
End: 2021-10-14

## 2021-10-14 DIAGNOSIS — N39.0 URINARY TRACT INFECTION WITHOUT HEMATURIA, SITE UNSPECIFIED: Primary | ICD-10-CM

## 2021-10-14 PROCEDURE — 1111F DSCHRG MED/CURRENT MED MERGE: CPT | Performed by: FAMILY MEDICINE

## 2021-10-14 NOTE — CARE COORDINATION
Yusef [facility] Transitions Initial Follow Up Call    Call within 2 business days of discharge: Yes    Patient: Aris Dykes Patient : 1940   MRN: [id number]  Reason for Admission: PAFIB, UTI, Met Encephalopathy, Rash  Discharge Date: 10/13/21 RARS: Readmission Risk Score: 24  Facility: [facility]    Last Discharge [facility]       Complaint Diagnosis Description Type Department Provider    21 Irregular Heart Beat Urinary tract infection without hematuria, site unspecified . .. ED to Hosp-Admission (Discharged) (ADMITTED) Elijah Watters MD; Cristiane RIVERO         Non-face-to-face services provided:  Obtained and reviewed discharge summary and/or continuity of care documents    Care Transitions 24 Hour Call    Schedule Follow Up Appointment with PCP: Declined  Do you have any ongoing symptoms?: Yes  Patient-reported symptoms: Other (Comment: see note)  Interventions for patient-reported symptoms: Other (Comment: no reported or identified need)  Do you have a copy of your discharge instructions?: Yes  Do you have all of your prescriptions and are they filled?: Yes  Have you been contacted by a [facility] Pharmacist?: No  Have you scheduled your follow up appointment?: Yes  How are you going to get to your appointment?: Car - family or friend to transport  Were you discharged with any Home Care or Post Acute Services: Yes  Post Acute Services: [address] [place] [address] Dmitry Campos (Comment: [facility] [phone])  Do you feel like you have everything you need to keep you well at home?: Yes  Care Transitions Interventions  No Identified Needs       Future Appointments   Date Time Provider Jim Solano   10/18/2021  2:00 PM Sharon Doe MD [facility]  [address]   2022  2:10 PM Elise Lin MD [facility]       Challenges to be reviewed by the provider   Additional needs identified to be addressed with provider: no     Method of communication with provider : none    BPCI MONITORING  BUNDLE PERIOD: 10/13/21-22  DR- UTI  PCP: Dr Shiraz Figueroa [phone]  CARDIOLOGIST: Dr Andry Henderson [phone]  NEUROLOGIST: Dr Curtis Anderson [phone]  [facility] [phone]    Was this a readmission? No  Patient stated reason for admission: Confusion, irregular hr  Patients top risk factors for readmission: functional cognitive ability, functional physical ability, medical condition-Recurrent UTI, A Fib, Parkinsons, medication management and caregiver stress    Spoke with Donnie Manzo, Daughter for [facility] discharge call, verified Patient name and  as identifiers. Introduced self and explained BPCI program. Agreeable to ongoing BPCI follow up. Phone Assessment: Reports Patient still w/ some confusion however improved from presentation to hospital. Reports Patient voiding qs-- incontinent of b&b, total care. No noted or reported abd/flank/sp pain, urinary complaints, fever. No bm since home, denies abd distention. Per chart, large bm 10/12/21. Reports rash resolved. Dtr monitoring HR--wnl. No noted or reported dizziness, cp, sob, distress. Awaiting contact per [facility] for intake visit scheduling. Education Provided:   UTI   >Drink extra water (unless contraindicated as per MD) to help make urine less concentrated and help wash out bacteria causing infection. >Avoid drinks that are carbonated or have caffeine as can irritate bladder. >Change sanitary pads/depends often. >After urinating wipe front to back. >To relive pain otc or heating pad - never go to sleep w/ heating pad in place. AVS/Meds: Confirmed copy of AVS received, reviewed with Daughter Confirmed Patient obtained and is taking all new and routine rx as directed. Med education provided, questions answered, verbalizes understanding. Stressed importance of med compliance. 1111F medication review completed with Daughter . Advised obtaining 90 day supply of routine, prn meds. Advised contacting pharmacy/md for refill needs.      Resource Need Assessment:   Living Arrangements: lives w/ spouse; family assists w/ patient's daily care  ADLS:dependent, total care  DME:hosp bed, transport w/c, steady lift, abram lift w/ toliet sling (delievered per Dhruv Childers), bsc   Transportation: family  [address] --Silvio Doshi RN has notified agency. Referrals Made per CTN with Patient/Family Approval: [facility] Follow Up Appointments: Discussed importance of 7 day hospital follow up appointment for continuity of care. Transportation confirmed for the above appts. Dtr to contact PCP to see if appt can be changed to telehealth. [id number]:  Patient received Pfizer Covid 19 vaccine series. Advanced Care Planning: on file  Healthcare Decision Maker:    Primary Decision Maker: Kedar Dykes - Spouse - [phone]    Primary Decision Maker: [place]  - Child - [phone]    Advised to contact PCP / Mayra Godinez re: any health concerns for early outpatient intervention in an effort to avoid hospitalization.    Bettie Riggins RN

## 2021-10-18 ENCOUNTER — TELEPHONE (OUTPATIENT)
Dept: FAMILY MEDICINE CLINIC | Age: 81
End: 2021-10-18

## 2021-10-18 ENCOUNTER — TELEMEDICINE (OUTPATIENT)
Dept: FAMILY MEDICINE CLINIC | Age: 81
End: 2021-10-18
Payer: MEDICARE

## 2021-10-18 DIAGNOSIS — G20 PARKINSON DISEASE (HCC): ICD-10-CM

## 2021-10-18 DIAGNOSIS — E11.9 TYPE 2 DIABETES MELLITUS WITHOUT COMPLICATION, WITHOUT LONG-TERM CURRENT USE OF INSULIN (HCC): ICD-10-CM

## 2021-10-18 DIAGNOSIS — G20 PARKINSON'S DISEASE (HCC): ICD-10-CM

## 2021-10-18 DIAGNOSIS — I48.91 ATRIAL FIBRILLATION WITH RVR (HCC): Primary | ICD-10-CM

## 2021-10-18 PROCEDURE — G8417 CALC BMI ABV UP PARAM F/U: HCPCS | Performed by: FAMILY MEDICINE

## 2021-10-18 PROCEDURE — 1123F ACP DISCUSS/DSCN MKR DOCD: CPT | Performed by: FAMILY MEDICINE

## 2021-10-18 PROCEDURE — 1090F PRES/ABSN URINE INCON ASSESS: CPT | Performed by: FAMILY MEDICINE

## 2021-10-18 PROCEDURE — G8400 PT W/DXA NO RESULTS DOC: HCPCS | Performed by: FAMILY MEDICINE

## 2021-10-18 PROCEDURE — G8484 FLU IMMUNIZE NO ADMIN: HCPCS | Performed by: FAMILY MEDICINE

## 2021-10-18 PROCEDURE — 1036F TOBACCO NON-USER: CPT | Performed by: FAMILY MEDICINE

## 2021-10-18 PROCEDURE — 99214 OFFICE O/P EST MOD 30 MIN: CPT | Performed by: FAMILY MEDICINE

## 2021-10-18 PROCEDURE — 4040F PNEUMOC VAC/ADMIN/RCVD: CPT | Performed by: FAMILY MEDICINE

## 2021-10-18 PROCEDURE — 1111F DSCHRG MED/CURRENT MED MERGE: CPT | Performed by: FAMILY MEDICINE

## 2021-10-18 PROCEDURE — G8427 DOCREV CUR MEDS BY ELIG CLIN: HCPCS | Performed by: FAMILY MEDICINE

## 2021-10-18 RX ORDER — AMANTADINE HYDROCHLORIDE 100 MG/1
100 CAPSULE, GELATIN COATED ORAL NIGHTLY
Qty: 90 CAPSULE | Refills: 1 | Status: SHIPPED | OUTPATIENT
Start: 2021-10-18

## 2021-10-18 NOTE — TELEPHONE ENCOUNTER
Dulce Maria Singh said she could not do the patient's INR today, so could she have a verbal order to see patient tomorrow (10/19/21) and take patient's INR.

## 2021-10-18 NOTE — PROGRESS NOTES
10/18/2021    TELEHEALTH EVALUATION -- Audio/Visual (During OHNYR-39 public health emergency) at her [place] in [place]    HPI:    Desi Salas (:  1940) has requested an audio/video evaluation for the following concern(s):    Anna Marie is home less than a week after a prolonged hospital stay for what sounds to started off to be urosepsis although new onset atrial fibrillation relation alone could have been the diagnosis. Patient also had significant constipation while in the hospital that actually stopped patient from eating. Patient received a whole body rash from Bactrim and is marked as a allergy    Patient had significant mental status changes and decreased cognition sounds like for the first week in the hospital.  They attributed possibly to her IV antibiotic which is unclear but fine to avoid. Daughter Tegan Hyman is doing an immense amount of care for her mom. Home health care is needed. REVIEW OF SYSTEMS    Constitutional:  Denies fever, chills, weight loss or weakness  Eyes:  no photophobia or discharge  ENT:  no sore throat or ear pain  Cardiovascular:  Denies chest pain, palpitations or swelling  Respiratory:  Denies cough or shortness of breath  GI:  no abdominal pain, nausea, vomiting, or diarrhea  Musculoskeletal:  no back pain  Skin:  No rashes  Neurologic:  no headache, focal weakness, or sensory changes  Endocrine:  no polyuria or polydipsia        Prior to Visit Medications    Medication Sig Taking? Authorizing Provider   metFORMIN (GLUCOPHAGE) 500 MG tablet 1 pill with evening meds.  Yes Aminah Randolph MD   amantadine (SYMMETREL) 100 MG capsule Take 1 capsule by mouth nightly Yes Aminah Randolph MD   carbidopa-levodopa (SINEMET)  MG per tablet Take 2 tablets by mouth 2 times daily 25/100 mg, 2 tablets in the morning, 1 tablet mid day and 2 tablets at night Yes Brigitte Rangel MD   warfarin (COUMADIN) 1 MG tablet Take 0.5 mg daily, check PT/INR on 10/18/21 Yes Joseph Hernandez MD   famotidine (PEPCID) 20 MG tablet Take 1 tablet by mouth 2 times daily Yes Joseph Hernandez MD   lisinopril (PRINIVIL;ZESTRIL) 10 MG tablet Take 1 tablet by mouth daily Yes Joseph Hernandez MD   metoprolol tartrate (LOPRESSOR) 25 MG tablet Take 1 tablet by mouth 2 times daily Yes Joseph Hernandez MD   melatonin 3 MG TABS tablet Take 2 tablets by mouth nightly as needed (sleep) Yes Joseph Hernandez MD   sennosides-docusate sodium (SENOKOT-S) 8.6-50 MG tablet Take 2 tablets by mouth nightly Yes Joseph Hernandez MD   folic acid (FOLVITE) 1 MG tablet TAKE ONE (1) TABLET BY MOUTH ONCE DAILY Yes Samantha Thompson MD   levothyroxine (SYNTHROID) 50 MCG tablet Take 1 tablet by mouth Daily Yes Loel Goodpasture, PA-C   Accu-Chek Softclix Lancets MISC USE 1 LANCET ONCE A DAY Yes Samantha Thompson MD   blood glucose test strips (ACCU-CHEK HIEN PLUS) strip TEST BLOOD SUGAR DAILY AS NEEDED Yes Samantha Thompson MD   baclofen (LIORESAL) 10 MG tablet TAKE ONE-HALF (1/2) TABLET BY MOUTH SIX (6) TIMES A DAY AND ONE (1) TABLET BY MOUTH IN EVENING  Patient taking differently: Take 5 mg by mouth every 4 hours as needed Patient does not take this routinely very PRN Yes Samantha Thompson MD   vitamin B-12 (CYANOCOBALAMIN) 1000 MCG tablet Take 1,000 mcg by mouth every other day  Yes Historical Provider, MD   Cholecalciferol (VITAMIN D) 2000 UNITS CAPS capsule Take 1 capsule by mouth.  Yes Historical Provider, MD   polyethylene glycol (GLYCOLAX) 17 g packet Take 17 g by mouth daily  Patient not taking: Reported on 10/18/2021  Joseph Hernandez MD   lubiprostone (AMITIZA) 24 MCG capsule Take 1 capsule by mouth 2 times daily (with meals)  Patient not taking: Reported on 10/18/2021  Joseph Hernandez MD       Social History     Tobacco Use    Smoking status: Never Smoker    Smokeless tobacco: Never Used   Vaping Use    Vaping Use: Never used   Substance Use Topics    Alcohol use: No     Comment: caffeine 1 pop a day    Drug use: No        PHYSICAL EXAM  There were no vitals taken for this visit. ASSESSMENT/PLAN:  1. Atrial fibrillation with RVR ([address]  I will watching patient's blood pressure and pulse. At times her blood pressure is getting down. I gave Elzbieta Maker parameters to call us if average is above or below a certain range. Patient educated that we need blood pressure as well as pulse. 2. Type 2 diabetes mellitus without complication, without long-term current use of insulin ([address]  Fair control. Continue meds. Refilled meds. - metFORMIN (GLUCOPHAGE) 500 MG tablet; 1 pill with evening meds. Dispense: 90 tablet; Refill: 1    3. Parkinson's disease [facility])  Patient appearing very slowed today. Hopefully not a new baseline since admission and discharge. Ambulate when possible\  Stay in contact with neurologists    - amantadine (SYMMETREL) 100 MG capsule; Take 1 capsule by mouth nightly  Dispense: 90 capsule; Refill: 1    4. Constipation  Increase MiraLAX continue Colace  As family wishes decrease the Amitiza due to cost down to as needed. Follow-up 3 weeks    Peyton Finn is a 78 y.o. female being evaluated by a Virtual Visit (video visit) encounter to address concerns as mentioned above. A caregiver was present when appropriate. Due to this being a TeleHealth encounter (During KTSBE-00 public health emergency), evaluation of the following organ systems was limited: Vitals/Constitutional/EENT/Resp/CV/GI//MS/Neuro/Skin/Heme-Lymph-Imm. Pursuant to the emergency declaration under the [facility] authority and the [employer] and [employer] Act, this Virtual Visit was conducted with patient's (and/or legal guardian's) consent, to reduce the patient's risk of exposure to COVID-19 and provide necessary medical care.   The patient (and/or legal guardian) has also been advised to contact this office for worsening conditions or problems, and seek emergency medical treatment and/or call 911 if deemed necessary. Patient identification was verified at the start of the visit: Yes    Total time spent on this encounter: Patient billed off total time30minutes  5 minutes prechart review  20 minutes spent with patient  5 minutes creating note         Services were provided through a video synchronous discussion virtually to substitute for in-person clinic visit. Patient and provider were located at their individual homes. --Dorinda Peraza MD on 10/18/2021 at 5:36 PM    An electronic signature was used to authenticate this note.

## 2021-10-19 ENCOUNTER — TELEPHONE (OUTPATIENT)
Dept: FAMILY MEDICINE CLINIC | Age: 81
End: 2021-10-19

## 2021-10-19 NOTE — TELEPHONE ENCOUNTER
Nazario from [facility] called with patients [facility] Time  PT 15.2  INR 1.3  Warfarin 0.5mg every day

## 2021-10-22 ENCOUNTER — TELEPHONE (OUTPATIENT)
Dept: CARDIOLOGY CLINIC | Age: 81
End: 2021-10-22

## 2021-10-22 ENCOUNTER — TELEPHONE (OUTPATIENT)
Dept: FAMILY MEDICINE CLINIC | Age: 81
End: 2021-10-22

## 2021-10-22 ENCOUNTER — CARE COORDINATION (OUTPATIENT)
Dept: CASE MANAGEMENT | Age: 81
End: 2021-10-22

## 2021-10-22 NOTE — TELEPHONE ENCOUNTER
Spoke with pt per dr Momo Edward note.  Pt agreed & voiced understanding    Spoke with c/m oliver - Jenna gave update order and med dose change

## 2021-10-22 NOTE — CARE COORDINATION
Yusef 45 Transitions Follow Up Call    10/22/2021    Patient: Padmini Dykes  Patient : 1940   MRN: <[id number]>  Reason for Admission:   Discharge Date: 10/13/21 RARS: Readmission Risk Score: 24         Spoke with: Patient's daughter    Contacted patient for BPCI-A follow up. Spoke with patient's daughter. She states her mother is doing overall generally better. She is having \"little bits\" of improvement. Martine Aguirre is still weak. She is using her walker and working with therapy. Continues to have \"some\" confusion. She is alert and talking more. She has been sleeping more. May get agitated when getting up to have to use the bathroom. She is urinating w/o issues or difficulties. She denies having any signs/symptoms of UTI. She's had bowel movements everyday since returning home. Stools are  Very soft. Martine Aguirre is eating and drinking fluids better. No c/o chest pain/discomfort, palpitations, shortness of breath, dizziness/lightheadedness. Daughter concerned about coumadin dosage and when next INR recheck is scheduled for. Reports PCP changed coumadin to 1 mg/0.5 mg/0.5 mg alternating every 3 days and recheck in 2 weeks. Daughter wonders if recheck should be in one week. She will contact PCP office to clarify. No other questions or concerns at this time. Care Transitions Subsequent and Final Call    Subsequent and Final Calls  Do you have any ongoing symptoms?: Yes  Onset of Patient-reported symptoms: Yesterday  Patient-reported symptoms: Other, Weakness  Have your medications changed?: Yes  Patient Reports: Coumadin-1 mg/0.5 mg/0.5 mg alternating every 3 days and recheck INR in 2 weeks.     Do you have any questions related to your medications?: No  Do you currently have any active services?: Yes  Are you currently active with any services?: Home Health  Do you have any needs or concerns that I can assist you with?: No  Care Transitions Interventions  Other Interventions: Follow Up  Future Appointments   Date Time Provider Jim Solano   11/8/2021  2:40 PM Zainab Neves MD [facility]   12/13/2021  3:00 PM HUMPHREY Dawkins [facility] NEURO [facility]   2/22/2022  2:10 PM Zainab Neves MD [facility]       Liz Mcneal RN

## 2021-10-22 NOTE — TELEPHONE ENCOUNTER
Spoke with pt's dtr inr 1.3 10/19/2021 coumadin dose was  0.5 mg qd. 10/20/21 per dr Cornelia Lizarraga instructions take 1 mg/ 0.5 mg/ 0.5mg recheck in 2 weeks. dtr  Cintia Atkins is concerned 2 weeks is too long to wait to recheck pt inr.  Pt was on atb's when inr was elevated during hospital stay    Please advise

## 2021-10-28 ENCOUNTER — TELEPHONE (OUTPATIENT)
Dept: NEUROLOGY | Age: 81
End: 2021-10-28

## 2021-10-28 NOTE — TELEPHONE ENCOUNTER
Patients daughter called and stated that the patient is having increased side effects of medication and wants to know if the dosage can be tweaked. Patient is lethargic and fatigued on current doseage through the day time and wakes in early morning hours extremely energized. Daugher asked:    1. Can the Sinemet dosage be split/changed/ spread out? 2. Is the Sinemet ER possible? Is it a better option? 3.  What effect does Sinemet have on heartrate    Please advise

## 2021-10-29 ENCOUNTER — ANTI-COAG VISIT (OUTPATIENT)
Dept: FAMILY MEDICINE CLINIC | Age: 81
End: 2021-10-29
Payer: MEDICARE

## 2021-10-29 ENCOUNTER — CARE COORDINATION (OUTPATIENT)
Dept: CASE MANAGEMENT | Age: 81
End: 2021-10-29

## 2021-10-29 ENCOUNTER — TELEPHONE (OUTPATIENT)
Dept: FAMILY MEDICINE CLINIC | Age: 81
End: 2021-10-29

## 2021-10-29 DIAGNOSIS — I26.99 PULMONARY EMBOLUS, RIGHT (HCC): Primary | ICD-10-CM

## 2021-10-29 LAB — INTERNATIONAL NORMALIZATION RATIO, POC: 1.5

## 2021-10-29 PROCEDURE — 85610 PROTHROMBIN TIME: CPT | Performed by: FAMILY MEDICINE

## 2021-10-29 NOTE — CARE COORDINATION
Yusef 45 Transitions Follow Up Call    10/29/2021    Patient: Aris Dykes  Patient : 1940   MRN: <[id number]>  Reason for Admission:  Discharge Date: 10/13/21 RARS: Readmission Risk Score: 24         Spoke with: Patient's daughter    Contacted patient for BPCI-A follow up. Spoke with patient's daughter. She states her mother is \"hanging in there\". Continues to have weakness but has seen improvement. She denies Makenzie Colunga having any urinary issues/difficulties or signs/symptoms of UTI. She denies any c/o chest pain/discomfort, shortness of breath. Had PT/INR checked today. Waiting on call back from either home health nurse or PCP office with dosage. She does not have any questions or needs from CTN at this time. Care Transitions Subsequent and Final Call    Subsequent and Final Calls  Do you have any ongoing symptoms?: Yes  Patient-reported symptoms: Weakness  Have your medications changed?: Yes  Patient Reports: Dtr waiting on call back from PCP office regarding coumadin dosage  Do you have any questions related to your medications?: No  Do you currently have any active services?: Yes  Are you currently active with any services?: Home Health  Do you have any needs or concerns that I can assist you with?: No  Care Transitions Interventions  Other Interventions:            Follow Up  Future Appointments   Date Time Provider Jim Solano   2021  2:40 PM Elise Lin MD [facility]   11/10/2021  2:30 PM Sharon Doe MD [facility]   2021  3:00 PM HUMPHREY Melendez [facility] NEURO [facility]   2022  2:10 PM Elise Lin MD [facility]       Babak Leong RN

## 2021-10-29 NOTE — PROGRESS NOTES
Spoke with Will nurse w/t c/m home care. Current coumadin is alt. 1/0.5mg qod.  Inr 1.5. per dr Rosa Maria De La Fuente change to 1 mg qf recheck in 2 weeks

## 2021-10-29 NOTE — TELEPHONE ENCOUNTER
PT  17.4    INR  1.5     Dosage of Coumadin:    1 mg, then 0.5 mg-- then repeat (for 7 days)      Please call him before the end of the day. He needs to know if you would like to change the dosage of coumadin and the next draw.       Jose Antonio Augustin 821 phone:  [phone]

## 2021-10-30 PROBLEM — N39.0 UTI (URINARY TRACT INFECTION): Status: RESOLVED | Noted: 2021-09-29 | Resolved: 2021-10-30

## 2021-11-02 NOTE — TELEPHONE ENCOUNTER
LVM for daughter explaining that the providers in this office are not the same as the [place] office and  that her questions needed to be directed to a specific provider in the [place] office.

## 2021-11-03 ENCOUNTER — CARE COORDINATION (OUTPATIENT)
Dept: CASE MANAGEMENT | Age: 81
End: 2021-11-03

## 2021-11-03 NOTE — CARE COORDINATION
[address] Follow Up Call    11/3/2021    Patient: Kristen Dykes  Patient : 1940   MRN: [id number]  Reason for Admission: PAFIB, UTI, Met Encephalopathy, Rash Discharge Date: 10/13/21 RARS: Readmission Risk Score: 24    Care Transitions Subsequent and Final Call    Subsequent and Final Calls  Are you currently active with any services?: [address] Transitions Interventions  Other Interventions:         Future Appointments   Date Time Provider Jim Solano   2021  2:00 PM Basilio Latif MD [facility]   11/10/2021  2:30 PM Samantha Thompson MD [facility]   2021  3:00 PM HUMPHREY Antonio [facility] NEURO [facility]   2022  2:10 PM Basilio Latif MD [facility]     BPCI MONITORING  BUNDLE PERIOD: 10/13/21-22  DR- UTI  PCP: Dr Deb Baltazar [phone]  CARDIOLOGIST: Dr Tim Bell [phone]  NEUROLOGIST: Dr Desai Calipatria [phone]  [facility] [phone]    Spoke w/ Dtr for follow up BPCI call. Reports Patient \"improving overall slowly\". Denies any urinary complaints, fever, chills, confusion. Difficult to get Patient to drink enough fluids; discussed benefits of fruits (watermelon) ,hydration supplements, encouraged to further discuss w/ PCP. Has VV appt w/ PCP 11/10/21. Reports Patient b&b wnl. Reports Patient adhering to DM diet w/ fasting BG averaging 117-134. Reports /64, HR 63, O2 sat 97% ra. No noted or Patient reported cp, palps, sob, ac distress. Reports Patient taking Coumadin as directed for PAFIB with routine INR monitoring. Has Cardiology appt 21. Dtr having Patient take Sinemet 1mg tid for Parkinsons d/t excessive drowsiness-- awaiting call back from Dr Francetta Phalen office to confirm MD agreeable with current dosage. Advised to continue previously discussed Covid19 preventative measures. Encouraged to discuss booster vaccine w/ PCP. Continues active w/ [facility]. Advised to contact HHC/PCP  for any health concerns for early outpt intervention.    Romaine Hernandez Doreen Rivers RN

## 2021-11-08 ENCOUNTER — TELEPHONE (OUTPATIENT)
Dept: FAMILY MEDICINE CLINIC | Age: 81
End: 2021-11-08

## 2021-11-08 NOTE — TELEPHONE ENCOUNTER
LOOSE STOOL, STOMACH NOT FEELING WELL AND IS GURGLING  KIND OF LETHARGIC. WHAT ABOUT LABS/STOOL SAMPLE JUST TO CHECK TO SEE IF ANYTHING GOING ON.  LM IF NURSE DOESN'T ANSWER

## 2021-11-09 ENCOUNTER — TELEPHONE (OUTPATIENT)
Dept: FAMILY MEDICINE CLINIC | Age: 81
End: 2021-11-09

## 2021-11-09 NOTE — TELEPHONE ENCOUNTER
[place] [facility] [place] and stated that patient is still having diarrhea ,weakness not eating or drinking. Same as yesterday 11-8-21.   Family is asking for labs  To be drawn   Call and advise

## 2021-11-10 ENCOUNTER — CARE COORDINATION (OUTPATIENT)
Dept: CASE MANAGEMENT | Age: 81
End: 2021-11-10

## 2021-11-10 ENCOUNTER — TELEMEDICINE (OUTPATIENT)
Dept: FAMILY MEDICINE CLINIC | Age: 81
End: 2021-11-10
Payer: MEDICARE

## 2021-11-10 DIAGNOSIS — E03.9 ACQUIRED HYPOTHYROIDISM: ICD-10-CM

## 2021-11-10 DIAGNOSIS — G20 PARKINSON'S DISEASE (HCC): ICD-10-CM

## 2021-11-10 DIAGNOSIS — R19.7 DIARRHEA, UNSPECIFIED TYPE: ICD-10-CM

## 2021-11-10 DIAGNOSIS — E11.9 TYPE 2 DIABETES MELLITUS WITHOUT COMPLICATION, WITHOUT LONG-TERM CURRENT USE OF INSULIN (HCC): Primary | ICD-10-CM

## 2021-11-10 DIAGNOSIS — I48.91 ATRIAL FIBRILLATION WITH RVR (HCC): ICD-10-CM

## 2021-11-10 PROCEDURE — 99214 OFFICE O/P EST MOD 30 MIN: CPT | Performed by: FAMILY MEDICINE

## 2021-11-10 PROCEDURE — G8400 PT W/DXA NO RESULTS DOC: HCPCS | Performed by: FAMILY MEDICINE

## 2021-11-10 PROCEDURE — G8417 CALC BMI ABV UP PARAM F/U: HCPCS | Performed by: FAMILY MEDICINE

## 2021-11-10 PROCEDURE — 1111F DSCHRG MED/CURRENT MED MERGE: CPT | Performed by: FAMILY MEDICINE

## 2021-11-10 PROCEDURE — 1123F ACP DISCUSS/DSCN MKR DOCD: CPT | Performed by: FAMILY MEDICINE

## 2021-11-10 PROCEDURE — 1036F TOBACCO NON-USER: CPT | Performed by: FAMILY MEDICINE

## 2021-11-10 PROCEDURE — 1090F PRES/ABSN URINE INCON ASSESS: CPT | Performed by: FAMILY MEDICINE

## 2021-11-10 PROCEDURE — 4040F PNEUMOC VAC/ADMIN/RCVD: CPT | Performed by: FAMILY MEDICINE

## 2021-11-10 PROCEDURE — G8484 FLU IMMUNIZE NO ADMIN: HCPCS | Performed by: FAMILY MEDICINE

## 2021-11-10 PROCEDURE — G8427 DOCREV CUR MEDS BY ELIG CLIN: HCPCS | Performed by: FAMILY MEDICINE

## 2021-11-10 RX ORDER — LEVOTHYROXINE SODIUM 0.05 MG/1
50 TABLET ORAL DAILY
Qty: 90 TABLET | Refills: 1 | Status: CANCELLED | OUTPATIENT
Start: 2021-11-10 | End: 2022-05-09

## 2021-11-10 RX ORDER — WARFARIN SODIUM 1 MG/1
1 TABLET ORAL DAILY
Qty: 30 TABLET | Refills: 3 | Status: SHIPPED | OUTPATIENT
Start: 2021-11-10 | End: 2022-01-07 | Stop reason: SDUPTHER

## 2021-11-10 RX ORDER — METRONIDAZOLE 500 MG/1
500 TABLET ORAL 3 TIMES DAILY
Qty: 30 TABLET | Refills: 0 | Status: SHIPPED | OUTPATIENT
Start: 2021-11-10 | End: 2021-11-20

## 2021-11-10 RX ORDER — BACILLUS COAGULANS/INULIN 1B-250 MG
CAPSULE ORAL
Qty: 60 CAPSULE | Refills: 1 | Status: SHIPPED | OUTPATIENT
Start: 2021-11-10

## 2021-11-10 RX ORDER — LEVOTHYROXINE SODIUM 0.05 MG/1
50 TABLET ORAL DAILY
Qty: 90 TABLET | Refills: 1 | Status: SHIPPED | OUTPATIENT
Start: 2021-11-10 | End: 2022-05-09

## 2021-11-10 NOTE — TELEPHONE ENCOUNTER
Please order stool for C. difficile. After sample is collected. Please start Flagyl 500 mg 3 times a day x10 days #30 refill zero. Please start probiotics over-the-counter one twice a day #60 refill one.

## 2021-11-10 NOTE — CARE COORDINATION
[facility] Follow Up Call    11/10/2021    Patient: Aris Dykes  Patient : 1940   MRN: [id number]  Reason for Admission: PAFIB, UTI, Met Encephalopathy, Rash    Discharge Date: 10/13/21 RARS: Readmission Risk Score: 25    Spoke briefly with  who states I will need to speak w/ Dtr however she is on another call. Requested call back at later time.      German Sotelo RN

## 2021-11-10 NOTE — TELEPHONE ENCOUNTER
INR 2.4  PTT 28.4    No Bowel Movement since Monday    BP-166/80    Patient hasn't had her Medication as of yet. Appetite is still very Poor.        Levothyroxine needs to be filled from [facility]

## 2021-11-10 NOTE — PROGRESS NOTES
tablet Take 1 tablet by mouth daily Yes Brett Magana MD   metoprolol tartrate (LOPRESSOR) 25 MG tablet Take 1 tablet by mouth 2 times daily Yes Brett Magana MD   melatonin 3 MG TABS tablet Take 2 tablets by mouth nightly as needed (sleep) Yes Brett Magana MD   folic acid (FOLVITE) 1 MG tablet TAKE ONE (1) TABLET BY MOUTH ONCE DAILY Yes Zee Tom MD   levothyroxine (SYNTHROID) 50 MCG tablet Take 1 tablet by mouth Daily Yes Ernesto Uribe PA-C   Accu-Chek Softclix Lancets MISC USE 1 LANCET ONCE A DAY Yes Zee Tom MD   blood glucose test strips (ACCU-CHEK HIEN PLUS) strip TEST BLOOD SUGAR DAILY AS NEEDED Yes Zee Tom MD   baclofen (LIORESAL) 10 MG tablet TAKE ONE-HALF (1/2) TABLET BY MOUTH SIX (6) TIMES A DAY AND ONE (1) TABLET BY MOUTH IN EVENING  Patient taking differently: Take 5 mg by mouth every 4 hours as needed Patient does not take this routinely very PRN Yes Zee Tom MD   vitamin B-12 (CYANOCOBALAMIN) 1000 MCG tablet Take 1,000 mcg by mouth every other day  Yes Historical Provider, MD   Cholecalciferol (VITAMIN D) 2000 UNITS CAPS capsule Take 1 capsule by mouth. Yes Historical Provider, MD   warfarin (COUMADIN) 1 MG tablet Take 0.5 mg daily, check PT/INR on 10/18/21  Brett Magana MD   polyethylene glycol (GLYCOLAX) 17 g packet Take 17 g by mouth daily  Patient not taking: Reported on 11/10/2021  Brett Magana MD   sennosides-docusate sodium (SENOKOT-S) 8.6-50 MG tablet Take 2 tablets by mouth nightly  Patient not taking: Reported on 11/10/2021  Brett Magana MD       Social History     Tobacco Use    Smoking status: Never Smoker    Smokeless tobacco: Never Used   Vaping Use    Vaping Use: Never used   Substance Use Topics    Alcohol use: No     Comment: caffeine 1 pop a day    Drug use: No          PHYSICAL EXAM  There were no vitals taken for this visit. ASSESSMENT/PLAN:  1.  Type 2 diabetes mellitus without complication, without long-term current use of insulin (HCC)  Reviewed A1c. At goal.  Remain on the same    2. Diarrhea, unspecified type  Have patient tested for C. difficile although story now does not sound like it  Patient already has empiric coverage out of her house if needed but will hold it since diarrhea may have resolved. 3. Atrial fibrillation with RVR (HCC)  Issue controlled. Continue meds. Refilled meds. 4. Acquired hypothyroidism  Issue controlled. Continue meds. Refilled meds. 5. Parkinson's disease ([facility]  Meds per neurology. Restart Colace 100 mg 2 pills daily      Return in about 4 weeks (around 12/8/2021). Marly Carvajal is a 78 y.o. female being evaluated by a Virtual Visit (video visit) encounter to address concerns as mentioned above. A caregiver was present when appropriate. Due to this being a TeleHealth encounter (During DYRCW-06 public health emergency), evaluation of the following organ systems was limited: Vitals/Constitutional/EENT/Resp/CV/GI//MS/Neuro/Skin/Heme-Lymph-Imm. Pursuant to the emergency declaration under the [address] [place] [address], [address] [place] [address] authority and the [facility] and [employer] Act, this Virtual Visit was conducted with patient's (and/or legal guardian's) consent, to reduce the patient's risk of exposure to COVID-19 and provide necessary medical care. The patient (and/or legal guardian) has also been advised to contact this office for worsening conditions or problems, and seek emergency medical treatment and/or call 911 if deemed necessary.      Patient identification was verified at the start of the visit: Yes    Total time spent on this encounter: Patient billed off total time30minutes  5 minutes prechart review  20 minutes spent with patient  5 minutes creating note         Services were provided through a video synchronous discussion virtually to substitute for in-person clinic visit. Patient and provider were located at their individual homes. --Ruslan Beth MD on 11/10/2021 at 6:34 PM    An electronic signature was used to authenticate this note.

## 2021-11-11 ENCOUNTER — TELEPHONE (OUTPATIENT)
Dept: FAMILY MEDICINE CLINIC | Age: 81
End: 2021-11-11

## 2021-11-11 NOTE — TELEPHONE ENCOUNTER
Med was sent to [facility] [address] 11/10/21 after 6 pm for delivery.  Will need to check with pharmacy 11/12/21

## 2021-11-11 NOTE — TELEPHONE ENCOUNTER
Zara Dickinson 187 called to get a Referral for Hospice for Patient. Home Health is Requesting a call asap.

## 2021-11-12 ENCOUNTER — CARE COORDINATION (OUTPATIENT)
Dept: CASE MANAGEMENT | Age: 81
End: 2021-11-12

## 2021-11-12 ENCOUNTER — HOSPITAL ENCOUNTER (OUTPATIENT)
Age: 81
Setting detail: SPECIMEN
Discharge: HOME | End: 2021-11-12
Payer: MEDICARE

## 2021-11-12 PROCEDURE — 87449 NOS EACH ORGANISM AG IA: CPT

## 2021-11-12 PROCEDURE — 87324 CLOSTRIDIUM AG IA: CPT

## 2021-11-12 NOTE — CARE COORDINATION
Yusef 45 Transitions Follow Up Call    2021    Patient: Julius Dykes  Patient : 1940   MRN: [id number]  Reason for Admission: PAFIB, UTI, Met Encephalopathy, Rash   Discharge Date: 10/13/21 RARS: Readmission Risk Score: 24    Care Transitions Subsequent and Final Call    Schedule Follow Up Appointment with PCP: Declined  Subsequent and Final Calls  Do you have any ongoing symptoms?: Yes  Onset of Patient-reported symptoms: In the past 7 days  Patient-reported symptoms: Other, Weakness  Interventions for patient-reported symptoms: Other  Have your medications changed?: Yes  Patient Reports: see note  Do you have any questions related to your medications?: No  Do you currently have any active services?: Yes  Are you currently active with any services?: Home Health  Do you have any needs or concerns that I can assist you with?: No  Identified Barriers: Other, Stress  Care Transitions Interventions   Home Care Waiver: Completed        Transportation Support: Declined      DME Assistance: Declined     Senior Services: Declined    Other Interventions:         Future Appointments   Date Time Provider Jim Solano   [facility]   2022  2:10 PM Alfredo Nieto MD [facility]     BPCI MONITORING  BUNDLE PERIOD: 10/13/21-22  DR- UTI  PCP: Dr Shiraz Figueroa [phone]  CARDIOLOGIST: Dr Andry Henderson [phone]  NEUROLOGIST: Dr Curtis Anderson [phone]  [address] [place] [address] Rd [phone]    Spoke in length w/ Dtr for follow up call. Reports 3 wks ago prior to admission for A Fib and UTI Patient was doing fairly well, at baseline with Parkinson's, able to swallow food/liquids/pills. Dtr noted significant decline in Patient upon return home-- increased Parkinsons sx, \"afraid or forgetting how to swallow\" at times but other days can swallow although slow, increased weakness/fatigue.  Reports at beginning of month Patient had started to improve w/ hhc pt/ot/st. Patient was more alert, standing and taking steps w/ assist, swallowing and eating/drinking better and getting up to bsc. Last weekend Patient had \"set back\" w/ large amounts of \"mushy\" stool x 1-3 each morning. Had VV w/ PCP 11/10/21 new rx for c-diff lab, Flagyl, Probiotic. However, Patient's last bm was Mon/Tues of this week-- no stool for specimen, PCP d/c'd Flagyl. Reports this week it has been very difficult to get Patient to eat or drink d/t lethargy and her forgetting how or being afraid to swallow. Reports noting stronger odor to urine, cloudy yellow. No fever, chills or urinary complaints. Offered to contact PCP as sx may be start of UTI as Patient has hx of recurrent UTIs and may be in need of ivf, atb. Dtr politely declines citing PCP is aware and not wanting to order rx and has suggested ED or Hospice. Patient/Family not wanting to return to hospital. Dtr spoke w/ Hospice and has decided against for now as she wants to give Patient time to recover hoping for rebound. Reports she is trying to be realistic and understands that Patient may be at end stage Parkinson's but wants to give her more time. Support given. Discussed benefits of Palliative Care, politely declines referral. Stressed need for hydration/nutrition. Encouraged Pedialyte, Gatorade, Ensure, hydration supplements, ice, sherbert, watermelon etc. Reminded to contact [facility]/PCP 24/7 if no improvement or conditions worsens. Agreeable to ongoing BPCI follow up.    Bettie Riggins, RN

## 2021-11-13 LAB
C DIFF AG + TOXIN: NORMAL
SOURCE: NORMAL

## 2021-11-17 ENCOUNTER — CARE COORDINATION (OUTPATIENT)
Dept: CASE MANAGEMENT | Age: 81
End: 2021-11-17

## 2021-11-17 NOTE — CARE COORDINATION
[facility] Transitions Follow Up Call    2021    Patient: Vickey Dykes  Patient : 1940   MRN: <[id number]>  Reason for Admission:   Discharge Date: 10/13/21 RARS: Readmission Risk Score: 24         Spoke with: Magdalena Glaser, patient's daughter    Contacted patient for BPCI-A follow up. Spoke with patient's daughter Magdalena Glaser. She states that her mother has been doing significantly better the last few days. She states that her mother has been eating and drinking fluids the last few days. She was able to work with PT and was able to take a few steps with her walker. Continues to have weakness and still tires easily though. She reports her mother's urine no longer is cloudy or has a strong odor. States urine is a lot clearer. She has been encouraging her to drink fluids. Denies having any fever but states she always feels chilled. Bowels are still an issue. She states that she had \"runny stools\" and now she has been constipated. She gave her mother some prune juice this morning and will monitor her bowel movements. She has stool softeners if the prune juice is not effective. She is aware of when to contact her provider. No questions or needs at this time. Will continue to follow. Care Transitions Subsequent and Final Call    Subsequent and Final Calls  Do you have any ongoing symptoms?: Yes  Patient-reported symptoms: Weakness, Other  Do you currently have any active services?: Yes  Are you currently active with any services?: Home Health  Do you have any needs or concerns that I can assist you with?: No  Care Transitions Interventions   Home Care Waiver: Completed        Transportation Support: Declined      DME Assistance: Declined     Senior Services: Declined    Other Interventions:            Follow Up  Future Appointments   Date Time Provider Jim Solano   2021  3:00 PM Emma Yanez, [address] [facility] NEURO HERB   2022  2:10 PM Jeff Alanis MD Count includes the [facility] Heart MMA       Aminah Been Lyle Renner

## 2021-11-24 ENCOUNTER — TELEPHONE (OUTPATIENT)
Dept: FAMILY MEDICINE CLINIC | Age: 81
End: 2021-11-24

## 2021-11-24 NOTE — TELEPHONE ENCOUNTER
----- Message from Graciela Blanco sent at 11/24/2021 12:39 PM EST -----  Subject: Message to Provider    QUESTIONS  Information for Provider? Chicho Levine from [employer] [facility] called in   to inform Dr. Gina Foster that two order was returned to her with no signature. Would like a call back  ---------------------------------------------------------------------------  --------------  CALL BACK INFO  What is the best way for the office to contact you? OK to leave message on   voicemail  Preferred Call Back Phone Number? [phone]  ---------------------------------------------------------------------------  --------------  SCRIPT ANSWERS  Relationship to Patient? Third Party  Representative Name?  [facility]

## 2021-11-26 ENCOUNTER — CARE COORDINATION (OUTPATIENT)
Dept: CASE MANAGEMENT | Age: 81
End: 2021-11-26

## 2021-11-26 NOTE — CARE COORDINATION
[address] Follow Up Call    2021    Patient: Khadijah Dykes  Patient : 1940   MRN: [id number]  Reason for Admission: PAFIB, UTI, Met Encephalopathy, Rash   Discharge Date: 10/13/21 RARS: Readmission Risk Score: 24    Future Appointments   Date Time Provider Jim Solano   2021  3:00 PM Jagdeep [address]   2022  2:10 PM Zainab Neves MD [facility]     BPCI MONITORING  BUNDLE PERIOD: 10/13/21-22  VWM: 998- UTI  PCP: Dr Kaylynn Paul [phone]  CARDIOLOGIST: Dr Rober Jacob [phone]  NEUROLOGIST: Dr Queen [phone] briefly w/ Dtr however call deferred to later date as family eating. Reports overall Patient better, some days better than others. Continues active w/ [address]-- taking steps w/ therapy. No c/o or needs voiced.      Nathan Marquez, RN

## 2021-12-02 NOTE — DISCHARGE SUMMARY
Discharge Summary    Name:  Kristen [facility] /Age/Sex: 1940  (78 y.o. female)   MRN & CSN:  [id number] & [id number] Admission Date/Time: 2021 11:26 AM   Attending:  No att. providers found Discharging Physician: Joseph Hernandez MD     Hospital Course: Doc Singer is a 78 y.o.  female  who presents with Acute encephalopathy    Doc Singer is a 78 y.o.  female with past medical history of Parkinson's disease who was admitted for A. fib with rapid ventricular response, confusion and found to have urinary tract infection. Urine culture came back positive for Klebsiella. Patient remains confused. Patient daughter was concerned about abnormal extension of a tissue from her femalegenitalia and wanted OB/GYN to see patient. OB/GYN consult pending.     1.         Paroxysmal atrial fibrillation with rapid ventricular response: Rate controlled  2.         Urinary tract infection with urine culture being positive for Klebsiella  3.         Metabolic encephalopathy & hospital delirium  4.         History of Parkinson's disease  5.         Other medical problem include diabetes, hypothyroidism  6.         Abnormal skin extension from genitalia  7. Hypomagnesemia resolved  8. Maculopapular Rash--->allergic reaction, improving with steroids, switch to PO tomorrow   9. Constipation-->refractory to several multiple attempts with different agents over the past few days, CT abd/pelvis w/o contrast to rule out possible fecal impaction.  GI consulted, appreciate recs      Plan:  Rash likely secondary to ?bactrim--->much improved with steroids, transition to PO steroids   Slight bradycardia noted-->metoprolol dosage adjusted  AMS improving today, patient more awake and alert    Family states patient reporting throat burning--->add spray, acid reflux meds-->resolved  Delirium--patient oscillating btwn hyperactive and hypoactive delirium   Will take mental status time to recover after hospitalization  Consider palliative care for patient, maybe even on outpatient basis   Likely will need increased services on discharge if family refusing SNF placement  Patient titrated off cefepime--may be worsening confusion     The patient expressed appropriate understanding of and agreement with the discharge recommendations, medications, and plan. Consults this admission:  IP CONSULT TO CASE MANAGEMENT  IP CONSULT TO CARDIOLOGY  IP CONSULT TO HOSPITALIST  IP CONSULT TO PHARMACY  IP CONSULT TO CARDIOLOGY  IP CONSULT TO NEUROLOGY  IP CONSULT TO ELECTROPHYSIOLOGY  IP CONSULT TO NEUROLOGY  IP CONSULT TO OB GYN  IP CONSULT TO CARDIOLOGY  IP CONSULT TO IV TEAM  IP CONSULT TO GI  IP CONSULT TO NEUROLOGY  IP CONSULT TO PALLIATIVE CARE  IP CONSULT TO HOME CARE NEEDS    Discharge Instruction:   Follow up appointments: PCP, cardiology  Primary care physician:  within 2 weeks    Diet:  regular diet   Activity: activity as tolerated  Disposition: Discharged to:   []Home, [x]HHC, []SNF, []Acute Rehab, []Hospice   Condition on discharge: Stable    Discharge Medications:   @DISCHARGEMEDSLIST(<NOROUTINE> error)@    Objective Findings at Discharge:   /73   Pulse 85   Temp 97.7 °F (36.5 °C) (Oral)   Resp 16   Ht 5' 3\" (1.6 m)   Wt 175 lb 9.6 oz (79.7 kg)   SpO2 95%   BMI 31.11 kg/m²            PHYSICAL EXAM   GEN  female, somnolent lying in bed, Appears given age. EYES   Pupils are equally round. No scleral erythema, discharge, or conjunctivitis. HENT  Mucous membranes are moist. Oral pharynx without exudates, no evidence of thrush. RESP  Clear to auscultation, no wheezes, rales or rhonchi. Symmetric chest movement while on room air. CARDIO/VASC           S1/S2 auscultated. Regular rate without appreciable murmurs, rubs, or gallops. GI        Abdomen is soft without significant tenderness, masses, or guarding. MSK    No gross joint deformities. SKIN    Normal coloration, warm, dry.   NEURO Moves all four extremities, weaker on left side baseline per family  PSYCH            Awake, alert, oriented x 1. Affect appropriate. BMP/CBC  No results for input(s): NA, K, CL, CO2, BUN, CREATININE, WBC, HEMOGLOBIN, HCT, PLT in the last 72 hours.     Invalid input(s): GLU    Discharge Time of 45 minutes    Electronically signed by Dimitry Gonsalez MD on 12/1/2021 at 9:45 PM

## 2021-12-03 ENCOUNTER — CARE COORDINATION (OUTPATIENT)
Dept: CASE MANAGEMENT | Age: 81
End: 2021-12-03

## 2021-12-03 NOTE — CARE COORDINATION
[address] Follow Up Call    12/3/2021    Patient: Petrona Dykes  Patient : 1940   MRN: [id number]  Reason for Admission: PAFIB, UTI, Met Encephalopathy, Rash   Discharge Date: 10/13/21 RARS: Readmission Risk Score: 24    Future Appointments   Date Time Provider Jim Solano   2021  3:00 PM [facility] NEURO MMA   2022  2:10 PM [facility]     BPCI MONITORING  BUNDLE PERIOD: 10/13/21-22  BWU: 091- UTI  PCP: Dr Brittany Connors [phone]  CARDIOLOGIST: Dr Maria Luisa Tavarez [phone]  NEUROLOGIST: Dr Queen [phone]    Attempt to reach for BPCI follow up call unsuccessful. HIPAA compliant message left requesting call back.    Jose Rafael Thakur RN

## 2021-12-08 ENCOUNTER — TELEPHONE (OUTPATIENT)
Dept: FAMILY MEDICINE CLINIC | Age: 81
End: 2021-12-08

## 2021-12-08 NOTE — TELEPHONE ENCOUNTER
Patient to be D/C form home care nursing only Pro time has been good. . Call patients daughter Axel Liang [phone] and let her know when to do Pro-time next. Would it be possible for patient to have a home devise to let her daughter check her pro-time at home.

## 2021-12-09 ENCOUNTER — TELEPHONE (OUTPATIENT)
Dept: FAMILY MEDICINE CLINIC | Age: 81
End: 2021-12-09

## 2021-12-14 ENCOUNTER — CARE COORDINATION (OUTPATIENT)
Dept: CASE MANAGEMENT | Age: 81
End: 2021-12-14

## 2021-12-14 NOTE — CARE COORDINATION
[facility] Transitions Follow Up Call    2021    Patient: Anabella Dykes  Patient : 1940   MRN: <[id number]>  Reason for Admission:   Discharge Date: 10/13/21 RARS: Readmission Risk Score: 24         Spoke with: Wilian Corral, patient's daughter    Contacted patient for BPCI-A follow up. Spoke with Wilian Corral, patient's daughter. States her mother has not been doing well the last 4-5 days. Angella Ybarra is not eating very much and drinking very little. She states her mother ate very little this morning. Continues to have \"some\" confusion after taking her Parkinson's medication which Wilian Corral states is not unusual.  Was unable to work with PT d/t not doing well so appointment was canceled for today. Patient has been wearing pull ups. Wilianyu Corral denies noticing any signs/symptoms of UTI which patient gets frequently. Denies having any fever or chills that she is aware of. She states the home health nurse discharged her last week. She states that home health nurse and PCP recommended hospice. She has not contacted hospice. Encouraged her to contact hospice to get some information and to get answers to any questions she may have. She does not want to contact hospice at this time. Advised she contact PCP office to schedule an appointment to discuss options for her mother as well as possible lab work if PCP feels necessary. She informed CTN that PCP recommended hospice and will not order fluids to be administered at home. She was told to take her back to the hospital.  Wilian Corral does not want to take her mother back to the hospital.  She agreed to contact the office to see if a virtual visit can be scheduled. Will continue to follow.          Care Transitions Subsequent and Final Call    Subsequent and Final Calls  Do you have any ongoing symptoms?: Yes  Patient-reported symptoms: Other  Do you currently have any active services?: Yes  Are you currently active with any services?: Home Health  Do you have any needs or concerns that I can assist you with?: No  Care Transitions Interventions   Home Care Waiver: Completed        Transportation Support: Declined      DME Assistance: Declined     Senior Services: Declined    Other Interventions:            Follow Up  Future Appointments   Date Time Provider Jim Solano   2/22/2022  2:10 PM Aleyda Reeves MD [address] [place] [address]       Nazario Cash RN

## 2021-12-15 ENCOUNTER — TELEPHONE (OUTPATIENT)
Dept: FAMILY MEDICINE CLINIC | Age: 81
End: 2021-12-15

## 2021-12-15 RX ORDER — NITROFURANTOIN 25; 75 MG/1; MG/1
100 CAPSULE ORAL 2 TIMES DAILY
Qty: 20 CAPSULE | Refills: 0 | Status: SHIPPED | OUTPATIENT
Start: 2021-12-15 | End: 2021-12-25

## 2021-12-15 NOTE — TELEPHONE ENCOUNTER
Amy Burns (daughter) reported patient is not eating and is \"getting worse\". Could patient possibly have another UTI? Please call Amy Burns, she really needs some ideas of what she should do to help her mother. Would you recommend another Home Care nurse to come out to the house and assess her situation again?      Connie's phone:  [phone]

## 2021-12-15 NOTE — TELEPHONE ENCOUNTER
Spoke with dtr Kayla Loss, pt not eating or drinking, confusion, increased physical weakness. Urine on depend dark, strong odor 5 days. Vitals 12/15/21 159/73 p 64 o2 94 % temp unknown.      Please advise

## 2021-12-15 NOTE — TELEPHONE ENCOUNTER
Urmila, sounds like she is not doing well. Please start Macrobid 100 mg twice daily for 10 days number 20 pills no refill. Good luck.

## 2021-12-20 ENCOUNTER — TELEPHONE (OUTPATIENT)
Dept: FAMILY MEDICINE CLINIC | Age: 81
End: 2021-12-20

## 2021-12-20 NOTE — TELEPHONE ENCOUNTER
Spoke with pt's dtr Dejah Li, sx's loss appetite, confusion, weakness unchanged. Pt started atb uti.dtr would like to speak with hospice.  Gave dtr ph number to [facility] hospice

## 2021-12-20 NOTE — TELEPHONE ENCOUNTER
To Adelina AGUDELO-    Please call Miguelito Slaughter back regarding her Mother---she said she was suppose to report on her Mother's situation.

## 2021-12-21 ENCOUNTER — CARE COORDINATION (OUTPATIENT)
Dept: CASE MANAGEMENT | Age: 81
End: 2021-12-21

## 2021-12-21 NOTE — CARE COORDINATION
[facility] Transitions Follow Up Call    2021    Patient: Aris Dykes  Patient : 1940   MRN: [id number]  Reason for Admission: UTI  Discharge Date: 10/13/21 RARS: Readmission Risk Score: 24    BPCI Care Transitions Follow Up Call:  Spoke with patient's dauhgter for Follow up [address] [place] Transition Call post hospital discharge. Patient's daughter states patient is very weak and stays in bed. Unable to get patient up and out to doctor due to deteriorating condition. Patient is confused at times. Appetite poor and not taking very much liquids. No S/S of UTI at this time. Patient has severe parkinsonism and has trouble swallowing. Take oral medications most of the time. Has to be alert and awake to swallow per daughter. Daughter did say she contacted Hospice and talked with PCP. Hospice not engaged at this time as daughter does not want to upset patient unnecessarily. Will continue to follow. Leighann Foley LPN    [phone]  Paty Quezada / Yusef Ricketts Coordinator    Verified name and  with family as identifiers. Advance Care Planning:   Does patient have an Advance Directive:  reviewed and current. Patients top risk factors for readmission: functional cognitive ability  functional physical ability  lack of knowledge about disease  medical condition-UTI  medication management  support system  utilization of services  caregiver stress  Interventions to address risk factors: Obtained and reviewed discharge summary and/or continuity of care documents    Non-[facility] follow up appointment(s):     CTN provided contact information for future needs. Plan for follow-up call in 10-14 days based on severity of symptoms and risk factors.   Plan for next call:            Care Transitions Subsequent and Final Call    Subsequent and Final Calls  Do you have any ongoing symptoms?: Yes  Patient-reported symptoms: Other, Weakness  Have your medications changed?: No  Do you have any

## 2021-12-23 ENCOUNTER — CARE COORDINATION (OUTPATIENT)
Dept: CASE MANAGEMENT | Age: 81
End: 2021-12-23

## 2021-12-23 ENCOUNTER — TELEPHONE (OUTPATIENT)
Dept: FAMILY MEDICINE CLINIC | Age: 81
End: 2021-12-23

## 2021-12-23 DIAGNOSIS — G20 PARKINSON'S DISEASE (HCC): Primary | ICD-10-CM

## 2021-12-23 NOTE — CARE COORDINATION
[address] Follow Up Call    2021    Patient: Edith Dykes  Patient : 1940   MRN: [id number]  Reason for Admission: PAFIB, UTI, Met Encephalopathy, Rash   Discharge Date: 10/13/21 RARS: Readmission Risk Score: 24    Care Transitions Subsequent and Final Call    Subsequent and Final Calls  Are you currently active with any services?: Home Health  Care Transitions Interventions   Home Care Waiver: Completed        Transportation Support: Declined      DME Assistance: Declined     Senior Services: Declined    Other Interventions:         Future Appointments   Date Time Provider Jim Solano   2022  2:10 PM Alicia Dean MD [facility]  GII: 486- UTI  PCP: Dr Briana Lepe [phone]  CARDIOLOGIST: Dr Nazario Singer [phone]  NEUROLOGIST: Dr Queen [phone]    Spoke w/ Dtr for follow up BPCI call. Reports Patient experienced set back/decline over past couple of weeks. Patient no longer eating/drinking well, increased weakness/lethargy, increased confusion, speech difficulties. Patient with Parkinsons, recurrent UTIs. Patient started on Macrobid 12/15/21 per PCP d/t dark urine w/ foul odor. Reports urine still dark--possible improvement however difficult to tell as incontinent, uses depends. Denies foul odor, fever or acute distress. Discharged from [facility] services as no longer able to participate. Referral was made to [facility]. Dtr spoke w/ hospice staff today; plan is to delay intake visit until next week in order to allow Patient to enjoy Rhonda holiday. Support given. Dtr aware she can call hospice in sooner if needed or if Patient further declines. Advised to contact PCP  as needed. Agreeable to ongoing BPCI follow up w/ plan for next call mid next week. Upon next outreach: confirm hospice--- if not initiated palliative? ? In home nurse/aide resources? ?   Darian Villanueva RN

## 2022-01-06 ENCOUNTER — CARE COORDINATION (OUTPATIENT)
Dept: CASE MANAGEMENT | Age: 82
End: 2022-01-06

## 2022-01-07 ENCOUNTER — TELEPHONE (OUTPATIENT)
Dept: FAMILY MEDICINE CLINIC | Age: 82
End: 2022-01-07

## 2022-01-07 RX ORDER — WARFARIN SODIUM 1 MG/1
1 TABLET ORAL DAILY
Qty: 30 TABLET | Refills: 3 | Status: SHIPPED | OUTPATIENT
Start: 2022-01-07

## 2022-01-07 RX ORDER — LISINOPRIL 10 MG/1
10 TABLET ORAL DAILY
Qty: 30 TABLET | Refills: 2 | Status: SHIPPED | OUTPATIENT
Start: 2022-01-07

## 2022-01-07 NOTE — TELEPHONE ENCOUNTER
Juli from [employer] called and stated : Case was opened for patient on 1-5-22 Requesting order for PT INR Verbal order, ok to  LM on VM. Code Changed to DNR CC patients daughter has the forms how do you want this handled? Do you want the daughter to stop by the office and wait for signature. What ever the daughter wants. She can bring it in this afternoon and wait or she can drop it off and we can have her pick it up on Monday.

## 2022-01-07 NOTE — CARE COORDINATION
[facility] Follow Up Call    2022    Patient: Wilder Dykes  Patient : 1940   MRN: [id number]  Reason for Admission: PAFIB, UTI, Met Encephalopathy, Rash   Discharge Date: 10/13/21 RARS: Readmission Risk Score: 24    Care Transitions Subsequent and Final Call    Schedule Follow Up Appointment with PCP: Declined  Subsequent and Final Calls  Do you have any ongoing symptoms?: Yes  Onset of Patient-reported symptoms: Other  Patient-reported symptoms: Other  Interventions for patient-reported symptoms: Other  Have your medications changed?: No  Do you have any questions related to your medications?: No  Do you currently have any active services?: Yes  Are you currently active with any services?: Other  Do you have any needs or concerns that I can assist you with?: No  Identified Barriers: Other, Stress, Medication Side Effects  Care Transitions Interventions   Home Care Waiver: Completed      Hospice: Completed  Transportation Support: Declined      DME Assistance: Declined     Senior Services: Declined    Other Interventions:         Future Appointments   Date Time Provider Jim Solano   2022  2:10 PM Mandi Bernard MD [facility] Heart MMA     [facility]  SCT: 696- UTI  PCP: Dr Jaycob Reeves [phone]  CARDIOLOGIST: Dr Jhonny Dennison [phone]  NEUROLOGIST: Dr Queen [phone]      Challenges to be reviewed by the provider   Additional needs identified to be addressed with provider:    Patient started on Hospice this week. Family struggling with decision, wondering if they made the right choice, asking about Palliative care. Would you please reach out to Zuleima Quiñonez Dtr [phone] for further advisement, support? Thank you Dr Fermin Him of communication with provider : chart routing    Spoke w/ Dtr for follow up BPCI call. Patient with Parkinsons, recurrent UTIs.  Dtr reports Patient now active w/ [facility] w/ intake visit today; was no longer to participate w/ [facility] d/t decline. Reports Patient has \"good and bad days\", decreased appetite however ate 1/2 chicken sandwich and jello today, not drinking well as has trouble with swallowing thin liquids and does not want thickened liquids. Dtr giving Patient Ensure and ice chips which she tolerates w/o choking episodes. Discussed hydration gels, mouth swabs. Urine still dark at times. Dtr struggling with Hospice decision, wondering if she has done the right thing for her Mom. Support given. Encouraged to openly discuss concerns, feelings w/ [facility] nurse and Dr Js Mcginnis. Dtr questioning options. Discussed [facility] which offers both Palliative and Hospice care. Dtr to speak with [facility] and look into Palliatve care options as she feels \"Mom doesn't really fit into [facility] or hospice\". Agreeable to final BPCI call. Given CTN contact number again and strongly encouraged to call with any questions or just needing to talk. Support given. Episode closed. Note routed to PCP.    Elisa Skelton RN

## 2022-01-11 ENCOUNTER — ANTI-COAG VISIT (OUTPATIENT)
Dept: FAMILY MEDICINE CLINIC | Age: 82
End: 2022-01-11

## 2022-01-11 ENCOUNTER — TELEPHONE (OUTPATIENT)
Dept: FAMILY MEDICINE CLINIC | Age: 82
End: 2022-01-11

## 2022-01-11 DIAGNOSIS — I26.99 PULMONARY EMBOLUS, RIGHT (HCC): Primary | ICD-10-CM

## 2022-01-11 LAB — INR BLD: 1.3

## 2022-01-11 NOTE — TELEPHONE ENCOUNTER
Change Coumadin to 1 mg alternated every other day with 2 mg every other day. Recheck INR in 1 to 2 weeks.

## 2022-01-11 NOTE — PROGRESS NOTES
Per Elieser Hernandez w/[facility] ph  inr 1.3 current coumadin dose 1 mg qd. Per Dr Js Mcginnis change to alt. 1/2 mg qod & recheck 1 to 2 weeks.  Elieser Hernandez & pt dtr Urmila weller

## 2022-01-30 DIAGNOSIS — E11.9 TYPE 2 DIABETES MELLITUS WITHOUT COMPLICATION, WITHOUT LONG-TERM CURRENT USE OF INSULIN (HCC): ICD-10-CM

## 2022-01-31 RX ORDER — BLOOD SUGAR DIAGNOSTIC
STRIP MISCELLANEOUS
Qty: 100 STRIP | Refills: 3 | Status: SHIPPED | OUTPATIENT
Start: 2022-01-31
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
## 2022-02-07 ENCOUNTER — ANTI-COAG VISIT (OUTPATIENT)
Dept: FAMILY MEDICINE CLINIC | Age: 82
End: 2022-02-07

## 2022-02-07 ENCOUNTER — TELEPHONE (OUTPATIENT)
Dept: FAMILY MEDICINE CLINIC | Age: 82
End: 2022-02-07

## 2022-02-07 DIAGNOSIS — I26.99 PULMONARY EMBOLUS, RIGHT (HCC): Primary | ICD-10-CM

## 2022-02-07 LAB — INR BLD: 2.9

## 2022-03-10 ENCOUNTER — TELEPHONE (OUTPATIENT)
Dept: FAMILY MEDICINE CLINIC | Age: 82
End: 2022-03-10

## 2022-03-10 ENCOUNTER — ANTI-COAG VISIT (OUTPATIENT)
Dept: FAMILY MEDICINE CLINIC | Age: 82
End: 2022-03-10

## 2022-03-10 DIAGNOSIS — I26.99 PULMONARY EMBOLUS, RIGHT (HCC): Primary | ICD-10-CM

## 2022-03-10 LAB — INR BLD: 2.1

## 2022-03-10 NOTE — TELEPHONE ENCOUNTER
Spoke with Pretty Crespo confirmed current coumadin is 1/2 mg.  Informed keeps same recheck in 4 weeks

## 2022-05-05 ENCOUNTER — HOSPITAL ENCOUNTER (OUTPATIENT)
Age: 82
Setting detail: SPECIMEN
Discharge: HOME | End: 2022-05-05
Payer: MEDICARE

## 2022-05-05 LAB
INR BLD: >10.1 INDEX
PROTHROMBIN TIME: 197.2 SECONDS (ref 11.7–14.5)

## 2022-05-05 PROCEDURE — 85610 PROTHROMBIN TIME: CPT

## 2023-08-31 NOTE — PROGRESS NOTES
[facility]  DEPARTMENT OF SPEECH/LANGUAGE PATHOLOGY  DAILY PROGRESS NOTE  [facility]  10/8/2021  [id number]  UTI (urinary tract infection) [N39.0]  Generalized weakness [R53.1]  Atrial fibrillation with RVR (HCC) [I48.91]  Subtherapeutic international normalized ratio (INR) [R79.1]  Urinary tract infection without hematuria, site unspecified [N39.0]  Allergies   Allergen Reactions    Ciprofibrate     Ciprofloxacin Other (See Comments)     Anorexia      Pregabalin          Pt was seen this date for dysphagia treatment. IMPRESSION AND RECOMMENDATIONS: Pramod Mark was seen for dysphagia follow-up. Daughter and  present throughout. Pt was seated upright in bed, alert, cooperative. She was more communicative and interactive today compared to previous sessions. She continues to benefit from increased time for communication and feeding. Direct meal intervention provided with breakfast tray including minced/moist textures, trials of thin liquids via tsp/straw and soft/regular solids. Oral stage moderately impaired, characterized by inconsistent mandibular depression, reduced labial seal and anterior loss, decreased buccal strength resulting in variably reduced ability to draw liquid through straw, prolonged/repetitive vertical \"munching\" mastication with reduced bolus formation, and minimal residue with minced/moist consistencies. Intermittent oral holding noted with all consistencies prior to initiation of AP transit, improved with lingual placement cues. Pharyngeal stage appears grossly intact without s/s aspiration across all trials. Pt also seen with pills administered by RN, whole in applesauce, 1-2 at a time. Following brief oral holding, pt effectively completed AP transit/oral clearance, and no s/s aspiration were identified. Recommend advancement to soft and bite-sized diet/thin liquids with strict aspiration precautions, assistance with meals.  Allow HUB TO READ RELAYED TO PATIENT   increased time for all PO intake and ensure complete oral clearance after each presentation of food/liquid. Give pills in pureed/pudding consistency. SLP will follow. Recommendations/plan d/w pt, family. GOALS (current status in bold):  Short-term Goals  Timeframe for Short-term Goals: length of admission  Goal 1: Pt will tolerate minced and moist diet/nectar thick liquids with adequate oral manipulation/clearance and no s/s aspiration. Partially meeting, diet advanced  Goal 2: Pt will tolerate PO trials of advanced textures with adequate oral manipulation/clearance and no s/s aspiration for safe diet upgrade. Meeting, diet advanced  Goal 3: Pt will participate in MBSS as indicated. DNT, not indicated at this time  Goal 4: Pt/caregivers will indicate understanding of all recommendations.  Meeting, recommendations discussed with pt/family      EDUCATION: as above    PAIN RATING (0-10 Scale): denies pain/does not rate  Time in/Time out: SLP Individual Minutes  Time In: 2146  Time Out: 4146 [facility]  Minutes: 60    Visit number: [facility]  10/8/2021  10:53 AM

## 2024-05-31 NOTE — PROGRESS NOTES
Comprehensive Nutrition Assessment    Type and Reason for Visit:  Initial (los 7 d)    Nutrition Recommendations/Plan:   · D/C Carb Control 5  · Begin high protein pudding and frozen oral nutrition supplements tid, between meals  · Total assist with feeding    Nutrition Assessment:  Pt admitted with UTI, confusion. H/O Parkinson's disease, A. fib with rapid ventricular response, DM. Sub optimal intake dos, Pt is a total assist on dysphagia diet, and is consuming less than half of meals per family and Flowsheets. Pt likes cold things, will trial frozen oral supplement and d/c carb control as A1C is down to 5.3. Wt is well maintained, no wounds. Will continue to follow as moderate nutrition risk. Malnutrition Assessment:  Malnutrition Status: At risk for malnutrition    Context:  Acute Illness       Estimated Daily Nutrient Needs:  Energy (kcal):  2149-2291 ([place]-St Jeor); Weight Used for Energy Requirements:  Current     Protein (g):  52-63 (1-1.2 g/kg IBW); Weight Used for Protein Requirements:  Ideal        Fluid (ml/day):  0181-5164; Method Used for Fluid Requirements:  1 ml/kcal      Nutrition Related Findings:  Glu 108, A1C 5.3      Wounds:  None       Current Nutrition Therapies:    ADULT DIET; Dysphagia - Minced and Moist; 5 carb choices (75 gm/meal); Mildly Thick (Nectar)    Anthropometric Measures:  · Height: 5' 3\" (160 cm)  · Current Body Weight: 188 lb 1.6 oz (85.3 kg)   · Admission Body Weight: 180 lb 11.2 oz (82 kg)    · Usual Body Weight: 190 lb (86.2 kg) (5/17/21)     · Ideal Body Weight: 115 lbs; % Ideal Body Weight 163.6 %   · BMI: 33.3  · BMI Categories: Obese Class 1 (BMI 30.0-34. 9)       Nutrition Diagnosis:   · Inadequate oral intake related to cognitive or neurological impairment, swallowing difficulty as evidenced by intake 26-50%    Nutrition Interventions:   Food and/or Nutrient Delivery:  Modify Current Diet, Start Oral Nutrition Supplement  Nutrition Education/Counseling:  No [employer] recommendation at this time   Coordination of Nutrition Care:  Continue to monitor while inpatient, Feeding Assistance/Environment Change, Speech Therapy, Swallow Evaluation    Goals:  Pt will consume greater than half of her meals and supplements       Nutrition Monitoring and Evaluation:   Behavioral-Environmental Outcomes:  None Identified   Food/Nutrient Intake Outcomes:  Diet Advancement/Tolerance, Food and Nutrient Intake, Supplement Intake  Physical Signs/Symptoms Outcomes:  Biochemical Data, Chewing or Swallowing, GI Status, Meal Time Behavior, Skin, Weight     Discharge Planning:    No discharge needs at this time     Electronically signed by Ryne Corey RD, LD on 10/6/21 at 11:19 AM EDT    Contact: [phone]

## 2024-12-09 NOTE — TELEPHONE ENCOUNTER
Returned call and spoke with Will -  Informed per dr Marta Sultana change coumadin to 1 mg qd recheck in 2 weeks.  Will voiced understanding DISPLAY PLAN FREE TEXT